# Patient Record
Sex: MALE | Race: WHITE | NOT HISPANIC OR LATINO | Employment: OTHER | URBAN - METROPOLITAN AREA
[De-identification: names, ages, dates, MRNs, and addresses within clinical notes are randomized per-mention and may not be internally consistent; named-entity substitution may affect disease eponyms.]

---

## 2017-08-18 ENCOUNTER — GENERIC CONVERSION - ENCOUNTER (OUTPATIENT)
Dept: OTHER | Facility: OTHER | Age: 82
End: 2017-08-18

## 2017-10-19 ENCOUNTER — GENERIC CONVERSION - ENCOUNTER (OUTPATIENT)
Dept: OTHER | Facility: OTHER | Age: 82
End: 2017-10-19

## 2017-12-14 ENCOUNTER — GENERIC CONVERSION - ENCOUNTER (OUTPATIENT)
Dept: OTHER | Facility: OTHER | Age: 82
End: 2017-12-14

## 2018-01-12 NOTE — MISCELLANEOUS
Message   Recorded as Task   Date: 10/19/2017 12:20 PM, Created By: Akira Garcia   Task Name: Call Back   Assigned To: Paramjit JAVIER,TEAM   Regarding Patient: Trisha Rojas, Status: In Progress   CommentTheron Kehr - 19 Oct 2017 12:20 PM     TASK CREATED  Caller: Tricia Phan, Other; [phone]  [facility] lmom in need of new cath change order  Memo Adhikari - 19 Oct 2017 12:41 PM     TASK IN PROGRESS   Memo Adhikari - 19 Oct 2017 12:41 PM     TASK EDITED  LMOM TO CALL BACK  ParrySaima - 19 Oct 2017 12:45 PM     TASK EDITED  VERBAL ORDER GIVEN TO CONTINUE SP TUBE HOME CARE CHANGE, EVERY 4-6 WEEKS, AND FLUSH AS NEEDED          Signatures   Electronically signed by : Nilda Hernandez, ; Oct 19 2017 12:45PM EST                       (Author)

## 2018-01-16 NOTE — MISCELLANEOUS
Message   Recorded as Task   Date: 08/18/2017 01:12 PM, Created By: Florin Richard   Task Name: Call Back   Assigned To: Paramjit JAVIER,TEAM   Regarding Patient: Neri Wan, Status: Active   Comment:    Florin Richard - 18 Aug 2017 1:12 PM     TASK CREATED    [facility] calling pt is up for recert and is asking to speak to a nurse  Randi 98 - 18 Aug 2017 2:14 PM     TASK EDITED  Spoke to Ron Kent, Recertification for Home Care given          Signatures   Electronically signed by : Jeffery Griffiths RN; Aug 18 2017  2:14PM EST                       (Author)

## 2018-01-18 NOTE — MISCELLANEOUS
Message   Recorded as Task   Date: 10/19/2017 12:20 PM, Created By: Alissa Adler   Task Name: Call Back   Assigned To: Paramjit JAVIER,TEAM   Regarding Patient: Vianney Westfall, Status: In Progress   CommentRome Dill - 19 Oct 2017 12:20 PM     TASK CREATED  Caller: Priscilla Robles, Other; [phone]  [facility] lmom in need of new cath change order  Mayra Chin - 19 Oct 2017 12:41 PM     TASK IN PROGRESS   Mayra Chin - 19 Oct 2017 12:41 PM     TASK EDITED  LMOM TO CALL BACK          Signatures   Electronically signed by : Lolita Williamson, ; Oct 19 2017 12:42PM EST                       (Author)

## 2018-01-23 NOTE — MISCELLANEOUS
Message   Recorded as Task   Date: 12/14/2017 03:22 PM, Created By: Quan Johnson   Task Name: Call Back   Assigned To: Paramjit WatsonB,TEAM   Regarding Patient: Christina Andersen, Status: Active   Comment:    Quan Johnson - 14 Dec 2017 3:22 PM     Paramjit Cain calling pt is up for recert and looking for a verbal  [phone]   Gemma Mae - 14 Dec 2017 4:36 PM     TASK EDITED  SPOKE TO Marek Rider  VERBAL GIVEN FOR RECERT          Signatures   Electronically signed by : Rachel Mcmahon RN; Dec 14 2017  4:37PM EST                       (Author)

## 2018-02-06 ENCOUNTER — TELEPHONE (OUTPATIENT)
Dept: UROLOGY | Facility: MEDICAL CENTER | Age: 83
End: 2018-02-06

## 2018-04-13 ENCOUNTER — TELEPHONE (OUTPATIENT)
Dept: UROLOGY | Facility: AMBULATORY SURGERY CENTER | Age: 83
End: 2018-04-13

## 2018-04-13 NOTE — TELEPHONE ENCOUNTER
Spoke with Everett Severance from [facility]  Patient is being discharged  If you have any questions please call back [phone]   Thank you

## 2018-04-13 NOTE — TELEPHONE ENCOUNTER
SPOKE WITH ANALIA, SHE SAY'S THAT THE PT S HOME NURSE WILL STILL BE FOLLOWING UP AND CHANGING HIS CATHETER AND HE WILL KEEP FOLLOWED UP APPT  IN July

## 2018-04-19 ENCOUNTER — TELEPHONE (OUTPATIENT)
Dept: UROLOGY | Facility: HOSPITAL | Age: 83
End: 2018-04-19

## 2018-06-15 ENCOUNTER — TELEPHONE (OUTPATIENT)
Dept: UROLOGY | Facility: MEDICAL CENTER | Age: 83
End: 2018-06-15

## 2018-06-15 DIAGNOSIS — N39.0 URINARY TRACT INFECTION WITHOUT HEMATURIA, SITE UNSPECIFIED: Primary | ICD-10-CM

## 2018-07-19 ENCOUNTER — OFFICE VISIT (OUTPATIENT)
Dept: UROLOGY | Facility: MEDICAL CENTER | Age: 83
End: 2018-07-19
Payer: MEDICARE

## 2018-07-19 VITALS
WEIGHT: 165 LBS | HEIGHT: 69 IN | BODY MASS INDEX: 24.44 KG/M2 | DIASTOLIC BLOOD PRESSURE: 80 MMHG | SYSTOLIC BLOOD PRESSURE: 130 MMHG

## 2018-07-19 DIAGNOSIS — Z85.46 HISTORY OF PROSTATE CANCER: ICD-10-CM

## 2018-07-19 DIAGNOSIS — N39.3 URINARY INCONTINENCE, MALE, STRESS: Primary | ICD-10-CM

## 2018-07-19 PROCEDURE — 99214 OFFICE O/P EST MOD 30 MIN: CPT | Performed by: UROLOGY

## 2018-07-19 RX ORDER — MECLIZINE HYDROCHLORIDE 25 MG/1
25 TABLET ORAL
Status: ON HOLD | COMMUNITY
End: 2020-02-19 | Stop reason: SDUPTHER

## 2018-07-19 RX ORDER — IPRATROPIUM/ALBUTEROL SULFATE 20-100 MCG
MIST INHALER (GRAM) INHALATION
Status: ON HOLD | COMMUNITY
Start: 2018-07-05 | End: 2020-11-26

## 2018-07-19 RX ORDER — SIMVASTATIN 40 MG
40 TABLET ORAL
COMMUNITY

## 2018-07-19 RX ORDER — OXYBUTYNIN CHLORIDE 10 MG/1
15 TABLET, EXTENDED RELEASE ORAL DAILY
COMMUNITY
End: 2019-11-18 | Stop reason: DRUGHIGH

## 2018-07-19 RX ORDER — BUDESONIDE AND FORMOTEROL FUMARATE DIHYDRATE 160; 4.5 UG/1; UG/1
2 AEROSOL RESPIRATORY (INHALATION) 2 TIMES DAILY
COMMUNITY

## 2018-07-19 RX ORDER — MULTIVITAMIN
1 TABLET ORAL DAILY
COMMUNITY

## 2018-07-19 RX ORDER — ACETAMINOPHEN 325 MG/1
650 TABLET ORAL EVERY 6 HOURS PRN
COMMUNITY

## 2018-07-19 NOTE — LETTER
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  The patient for many years and and artificial urinary sphincter that worked quite well  Unfortunately the patient ultimately eroded into the urethra requiring removal of the artificial urinary sphincter initially with placement of a urethral Acevedo and eventually conversion of the urethral Acevedo to a suprapubic tube  The patient currently is having the suprapubic tube changed by the visiting nurse every 2 weeks and is pleased with the results of that care plan  2   History of prostate cancer  The patient has no evidence of disease recurrence  Diagnoses and all orders for this visit:    Urinary incontinence, male, stress    History of prostate cancer  -     PSA Total, Diagnostic; Future  -     Comprehensive metabolic panel; Future    Other orders  -     Multiple Vitamin (MULTIVITAMIN) tablet; Take 1 tablet by mouth daily  -     oxybutynin (DITROPAN-XL) 10 MG 24 hr tablet; Take 10 mg by mouth daily at bedtime  -     simvastatin (ZOCOR) 40 mg tablet; Take 40 mg by mouth daily at bedtime  -     budesonide-formoterol (SYMBICORT) 160-4 5 mcg/act inhaler; Inhale 2 puffs 2 (two) times a day Rinse mouth after use  -     meclizine (ANTIVERT) 25 mg tablet;  Take by mouth 3 (three) times a day as needed for dizziness  - acetaminophen (TYLENOL) 325 mg tablet; Take 650 mg by mouth every 6 (six) hours as needed for mild pain  -     COMBIVENT RESPIMAT inhaler;           Subjective:     Patient ID: Lopez Westfall is a 80 y o  male  Chief complaint:  History of prostate cancer and urinary incontinence with an indwelling suprapubic tube    History of present illness:  80year-old gentleman well known to me status post radical prostatectomy many years ago for prostate cancer with resultant artificial urinary sphincter surgery due to severe stress urinary incontinence  The sphincter was ultimately explanted because of erosion in the patient is now managed with a suprapubic tube noting still the requirement of 2-3 up protective undergarments because of urethral leakage daily  The patient however was pleased with his urinary status at this point and his tube will continue to be changed every 2-3 weeks by visiting nurse  Review of Systems   Constitutional: Negative  HENT: Negative  Respiratory: Negative  Cardiovascular: Negative  Gastrointestinal: Negative  Neurological: Negative  Objective:     Physical Exam   Constitutional: He is oriented to person, place, and time  He appears well-nourished  HENT:   Head: Atraumatic  Eyes: EOM are normal    Neck: Neck supple  Pulmonary/Chest: Effort normal  No respiratory distress  Abdominal: Soft  SP tube site CDI   Neurological: He is alert and oriented to person, place, and time  Psychiatric: He has a normal mood and affect  His behavior is normal  Judgment and thought content normal    Vitals reviewed

## 2018-07-19 NOTE — PROGRESS NOTES
Assessment/Plan:   1  Stress urinary incontinence status post radical prostatectomy many years ago for adenocarcinoma of the prostate  The patient for many years and and artificial urinary sphincter that worked quite well  Unfortunately the patient ultimately eroded into the urethra requiring removal of the artificial urinary sphincter initially with placement of a urethral Acevedo and eventually conversion of the urethral Acevedo to a suprapubic tube  The patient currently is having the suprapubic tube changed by the visiting nurse every 2 weeks and is pleased with the results of that care plan  2   History of prostate cancer  The patient has no evidence of disease recurrence  Diagnoses and all orders for this visit:    Urinary incontinence, male, stress    History of prostate cancer  -     PSA Total, Diagnostic; Future  -     Comprehensive metabolic panel; Future    Other orders  -     Multiple Vitamin (MULTIVITAMIN) tablet; Take 1 tablet by mouth daily  -     oxybutynin (DITROPAN-XL) 10 MG 24 hr tablet; Take 10 mg by mouth daily at bedtime  -     simvastatin (ZOCOR) 40 mg tablet; Take 40 mg by mouth daily at bedtime  -     budesonide-formoterol (SYMBICORT) 160-4 5 mcg/act inhaler; Inhale 2 puffs 2 (two) times a day Rinse mouth after use  -     meclizine (ANTIVERT) 25 mg tablet; Take by mouth 3 (three) times a day as needed for dizziness  -     acetaminophen (TYLENOL) 325 mg tablet; Take 650 mg by mouth every 6 (six) hours as needed for mild pain  -     COMBIVENT RESPIMAT inhaler;           Subjective:     Patient ID: Oziel Dukes is a 80 y o  male  Chief complaint:  History of prostate cancer and urinary incontinence with an indwelling suprapubic tube    History of present illness:  80year-old gentleman well known to me status post radical prostatectomy many years ago for prostate cancer with resultant artificial urinary sphincter surgery due to severe stress urinary incontinence    The sphincter was ultimately explanted because of erosion in the patient is now managed with a suprapubic tube noting still the requirement of 2-3 up protective undergarments because of urethral leakage daily  The patient however was pleased with his urinary status at this point and his tube will continue to be changed every 2-3 weeks by visiting nurse  Review of Systems   Constitutional: Negative  HENT: Negative  Respiratory: Negative  Cardiovascular: Negative  Gastrointestinal: Negative  Neurological: Negative  Objective:     Physical Exam   Constitutional: He is oriented to person, place, and time  He appears well-nourished  HENT:   Head: Atraumatic  Eyes: EOM are normal    Neck: Neck supple  Pulmonary/Chest: Effort normal  No respiratory distress  Abdominal: Soft  SP tube site CDI   Neurological: He is alert and oriented to person, place, and time  Psychiatric: He has a normal mood and affect  His behavior is normal  Judgment and thought content normal    Vitals reviewed

## 2018-08-21 ENCOUNTER — TELEPHONE (OUTPATIENT)
Dept: UROLOGY | Facility: AMBULATORY SURGERY CENTER | Age: 83
End: 2018-08-21

## 2018-08-21 NOTE — TELEPHONE ENCOUNTER
Spoke with [facility] home care nurse, she would like a re- certification order sent to her  Their call back number is [phone]   Thank you

## 2018-08-21 NOTE — TELEPHONE ENCOUNTER
Spoke to Phyllis falls  Re-certifcation due this week  Pt was seen 7/19/2018 in office  Verbal given per   orders for Re-certification of Home Care services

## 2018-08-22 NOTE — TELEPHONE ENCOUNTER
Pt's SP tube needs to be changed almost every two weeks due to sediment blocking  Irrigations are done weekly and pt presently has a 22 fr cath  Fluid intake is poor  Will forward to Dr Lance Shafferr re; increasing or changing irrigant

## 2018-08-22 NOTE — TELEPHONE ENCOUNTER
Nicolasa calling for recommendation as pt has continual blockage they have been irrigating with sterile water weekly     [phone]

## 2018-10-17 ENCOUNTER — TELEPHONE (OUTPATIENT)
Dept: UROLOGY | Facility: AMBULATORY SURGERY CENTER | Age: 83
End: 2018-10-17

## 2018-10-17 NOTE — TELEPHONE ENCOUNTER
Per Luan at [facility], she wanted to renew the orders  [phone] is the best call back number  Thank you

## 2018-10-18 NOTE — TELEPHONE ENCOUNTER
Phone Number for Pablo Dixon was incorrect it was for [facility] will await return call

## 2018-11-06 ENCOUNTER — HOSPITAL ENCOUNTER (INPATIENT)
Facility: HOSPITAL | Age: 83
LOS: 7 days | Discharge: HOME HEALTH | DRG: 193 | End: 2018-11-13
Attending: FAMILY MEDICINE | Admitting: INTERNAL MEDICINE
Payer: MEDICARE

## 2018-11-06 ENCOUNTER — APPOINTMENT (EMERGENCY)
Dept: RADIOLOGY | Facility: HOSPITAL | Age: 83
DRG: 193 | End: 2018-11-06
Payer: MEDICARE

## 2018-11-06 DIAGNOSIS — R09.02 HYPOXIA: ICD-10-CM

## 2018-11-06 DIAGNOSIS — J18.9 PNEUMONIA: ICD-10-CM

## 2018-11-06 DIAGNOSIS — J44.9 COPD (CHRONIC OBSTRUCTIVE PULMONARY DISEASE) (HCC): Primary | ICD-10-CM

## 2018-11-06 DIAGNOSIS — R06.02 SOB (SHORTNESS OF BREATH): ICD-10-CM

## 2018-11-06 DIAGNOSIS — J18.9 PNEUMONIA DUE TO INFECTIOUS ORGANISM, UNSPECIFIED LATERALITY, UNSPECIFIED PART OF LUNG: Chronic | ICD-10-CM

## 2018-11-06 DIAGNOSIS — I48.91 ATRIAL FIBRILLATION (HCC): ICD-10-CM

## 2018-11-06 PROBLEM — J96.21 ACUTE ON CHRONIC RESPIRATORY FAILURE WITH HYPOXIA (HCC): Chronic | Status: ACTIVE | Noted: 2018-11-06

## 2018-11-06 LAB
ANION GAP SERPL CALCULATED.3IONS-SCNC: 7 MMOL/L (ref 4–13)
BACTERIA UR QL AUTO: ABNORMAL /HPF
BASOPHILS # BLD AUTO: 0 THOUSANDS/ΜL (ref 0–0.1)
BASOPHILS NFR BLD AUTO: 0 % (ref 0–2)
BILIRUB UR QL STRIP: NEGATIVE
BNP SERPL-MCNC: 162 PG/ML (ref 1–100)
BUN SERPL-MCNC: 18 MG/DL (ref 7–25)
CALCIUM SERPL-MCNC: 8.8 MG/DL (ref 8.6–10.5)
CHLORIDE SERPL-SCNC: 97 MMOL/L (ref 98–107)
CLARITY UR: ABNORMAL
CO2 SERPL-SCNC: 30 MMOL/L (ref 21–31)
COLOR UR: YELLOW
CREAT SERPL-MCNC: 0.93 MG/DL (ref 0.7–1.3)
EOSINOPHIL # BLD AUTO: 0 THOUSAND/ΜL (ref 0–0.61)
EOSINOPHIL NFR BLD AUTO: 0 % (ref 0–5)
ERYTHROCYTE [DISTWIDTH] IN BLOOD BY AUTOMATED COUNT: 14.8 % (ref 11.5–14.5)
FLUAV AG SPEC QL IA: NEGATIVE
FLUBV AG SPEC QL IA: NEGATIVE
GFR SERPL CREATININE-BSD FRML MDRD: 71 ML/MIN/1.73SQ M
GLUCOSE SERPL-MCNC: 125 MG/DL (ref 65–99)
GLUCOSE UR STRIP-MCNC: NEGATIVE MG/DL
HCT VFR BLD AUTO: 38.7 % (ref 36.5–49.3)
HGB BLD-MCNC: 12.4 G/DL (ref 14–18)
HGB UR QL STRIP.AUTO: ABNORMAL
INR PPP: 1.09 (ref 0.9–1.5)
KETONES UR STRIP-MCNC: ABNORMAL MG/DL
LACTATE SERPL-SCNC: 1.1 MMOL/L (ref 0.5–2)
LEUKOCYTE ESTERASE UR QL STRIP: ABNORMAL
LYMPHOCYTES # BLD AUTO: 2.8 THOUSANDS/ΜL (ref 0.6–4.47)
LYMPHOCYTES NFR BLD AUTO: 36 % (ref 21–51)
MCH RBC QN AUTO: 26.6 PG (ref 26–34)
MCHC RBC AUTO-ENTMCNC: 32.1 G/DL (ref 31–37)
MCV RBC AUTO: 83 FL (ref 81–99)
MONOCYTES # BLD AUTO: 0.8 THOUSAND/ΜL (ref 0.17–1.22)
MONOCYTES NFR BLD AUTO: 10 % (ref 2–12)
NEUTROPHILS # BLD AUTO: 4.1 THOUSANDS/ΜL (ref 1.4–6.5)
NEUTS SEG NFR BLD AUTO: 53 % (ref 42–75)
NITRITE UR QL STRIP: POSITIVE
NON-SQ EPI CELLS URNS QL MICRO: ABNORMAL /HPF
NRBC BLD AUTO-RTO: 0 /100 WBCS
PH UR STRIP.AUTO: 5 [PH] (ref 5–8)
PLATELET # BLD AUTO: 147 THOUSANDS/UL (ref 149–390)
PLATELET # BLD AUTO: 157 THOUSANDS/UL (ref 149–390)
PMV BLD AUTO: 10.6 FL (ref 8.6–11.7)
POTASSIUM SERPL-SCNC: 4.1 MMOL/L (ref 3.5–5.5)
PROT UR STRIP-MCNC: ABNORMAL MG/DL
PROTHROMBIN TIME: 12.6 SECONDS (ref 10.2–13)
RBC # BLD AUTO: 4.65 MILLION/UL (ref 4.3–5.9)
RBC #/AREA URNS AUTO: ABNORMAL /HPF
SODIUM SERPL-SCNC: 134 MMOL/L (ref 134–143)
SP GR UR STRIP.AUTO: >=1.03 (ref 1–1.03)
UROBILINOGEN UR QL STRIP.AUTO: 0.2 E.U./DL
WBC # BLD AUTO: 7.6 THOUSAND/UL (ref 4.8–10.8)
WBC #/AREA URNS AUTO: ABNORMAL /HPF

## 2018-11-06 PROCEDURE — 85025 COMPLETE CBC W/AUTO DIFF WBC: CPT | Performed by: FAMILY MEDICINE

## 2018-11-06 PROCEDURE — 87631 RESP VIRUS 3-5 TARGETS: CPT | Performed by: FAMILY MEDICINE

## 2018-11-06 PROCEDURE — 36415 COLL VENOUS BLD VENIPUNCTURE: CPT | Performed by: FAMILY MEDICINE

## 2018-11-06 PROCEDURE — 99285 EMERGENCY DEPT VISIT HI MDM: CPT

## 2018-11-06 PROCEDURE — 87449 NOS EACH ORGANISM AG IA: CPT | Performed by: INTERNAL MEDICINE

## 2018-11-06 PROCEDURE — 83605 ASSAY OF LACTIC ACID: CPT

## 2018-11-06 PROCEDURE — 87040 BLOOD CULTURE FOR BACTERIA: CPT | Performed by: FAMILY MEDICINE

## 2018-11-06 PROCEDURE — 71046 X-RAY EXAM CHEST 2 VIEWS: CPT

## 2018-11-06 PROCEDURE — 93005 ELECTROCARDIOGRAM TRACING: CPT

## 2018-11-06 PROCEDURE — 85610 PROTHROMBIN TIME: CPT | Performed by: FAMILY MEDICINE

## 2018-11-06 PROCEDURE — 84145 PROCALCITONIN (PCT): CPT | Performed by: INTERNAL MEDICINE

## 2018-11-06 PROCEDURE — 80048 BASIC METABOLIC PNL TOTAL CA: CPT | Performed by: FAMILY MEDICINE

## 2018-11-06 PROCEDURE — 85049 AUTOMATED PLATELET COUNT: CPT | Performed by: INTERNAL MEDICINE

## 2018-11-06 PROCEDURE — 94664 DEMO&/EVAL PT USE INHALER: CPT

## 2018-11-06 PROCEDURE — 99222 1ST HOSP IP/OBS MODERATE 55: CPT | Performed by: INTERNAL MEDICINE

## 2018-11-06 PROCEDURE — 83880 ASSAY OF NATRIURETIC PEPTIDE: CPT | Performed by: FAMILY MEDICINE

## 2018-11-06 PROCEDURE — 81001 URINALYSIS AUTO W/SCOPE: CPT | Performed by: FAMILY MEDICINE

## 2018-11-06 PROCEDURE — 94760 N-INVAS EAR/PLS OXIMETRY 1: CPT

## 2018-11-06 PROCEDURE — 87081 CULTURE SCREEN ONLY: CPT | Performed by: INTERNAL MEDICINE

## 2018-11-06 RX ORDER — MECLIZINE HCL 12.5 MG/1
25 TABLET ORAL EVERY 8 HOURS PRN
Status: DISCONTINUED | OUTPATIENT
Start: 2018-11-06 | End: 2018-11-13 | Stop reason: HOSPADM

## 2018-11-06 RX ORDER — IPRATROPIUM BROMIDE AND ALBUTEROL SULFATE 2.5; .5 MG/3ML; MG/3ML
3 SOLUTION RESPIRATORY (INHALATION)
Status: DISCONTINUED | OUTPATIENT
Start: 2018-11-06 | End: 2018-11-08

## 2018-11-06 RX ORDER — HEPARIN SODIUM 5000 [USP'U]/ML
5000 INJECTION, SOLUTION INTRAVENOUS; SUBCUTANEOUS EVERY 8 HOURS SCHEDULED
Status: DISCONTINUED | OUTPATIENT
Start: 2018-11-06 | End: 2018-11-13 | Stop reason: HOSPADM

## 2018-11-06 RX ORDER — OXYBUTYNIN CHLORIDE 5 MG/1
10 TABLET, EXTENDED RELEASE ORAL
Status: DISCONTINUED | OUTPATIENT
Start: 2018-11-06 | End: 2018-11-13 | Stop reason: HOSPADM

## 2018-11-06 RX ORDER — IPRATROPIUM BROMIDE AND ALBUTEROL SULFATE 2.5; .5 MG/3ML; MG/3ML
3 SOLUTION RESPIRATORY (INHALATION)
Status: DISCONTINUED | OUTPATIENT
Start: 2018-11-06 | End: 2018-11-06

## 2018-11-06 RX ORDER — BUDESONIDE AND FORMOTEROL FUMARATE DIHYDRATE 160; 4.5 UG/1; UG/1
2 AEROSOL RESPIRATORY (INHALATION) 2 TIMES DAILY
Status: DISCONTINUED | OUTPATIENT
Start: 2018-11-06 | End: 2018-11-13 | Stop reason: HOSPADM

## 2018-11-06 RX ORDER — IPRATROPIUM BROMIDE AND ALBUTEROL SULFATE 2.5; .5 MG/3ML; MG/3ML
3 SOLUTION RESPIRATORY (INHALATION) ONCE
Status: COMPLETED | OUTPATIENT
Start: 2018-11-06 | End: 2018-11-06

## 2018-11-06 RX ORDER — ACETAMINOPHEN 325 MG/1
650 TABLET ORAL EVERY 6 HOURS PRN
Status: DISCONTINUED | OUTPATIENT
Start: 2018-11-06 | End: 2018-11-13 | Stop reason: HOSPADM

## 2018-11-06 RX ORDER — SODIUM CHLORIDE 9 MG/ML
75 INJECTION, SOLUTION INTRAVENOUS CONTINUOUS
Status: DISCONTINUED | OUTPATIENT
Start: 2018-11-06 | End: 2018-11-07

## 2018-11-06 RX ADMIN — IPRATROPIUM BROMIDE AND ALBUTEROL SULFATE 3 ML: .5; 3 SOLUTION RESPIRATORY (INHALATION) at 17:23

## 2018-11-06 RX ADMIN — HEPARIN SODIUM 5000 UNITS: 5000 INJECTION INTRAVENOUS; SUBCUTANEOUS at 22:53

## 2018-11-06 RX ADMIN — AZITHROMYCIN FOR INJECTION INJECTION, POWDER, LYOPHILIZED, FOR SOLUTION 500 MG: 500 INJECTION INTRAVENOUS at 23:57

## 2018-11-06 RX ADMIN — SODIUM CHLORIDE 75 ML/HR: 9 INJECTION, SOLUTION INTRAVENOUS at 20:36

## 2018-11-06 RX ADMIN — AZTREONAM 2000 MG: 2 INJECTION, POWDER, LYOPHILIZED, FOR SOLUTION INTRAMUSCULAR; INTRAVENOUS at 22:48

## 2018-11-06 RX ADMIN — BUDESONIDE AND FORMOTEROL FUMARATE DIHYDRATE 2 PUFF: 160; 4.5 AEROSOL RESPIRATORY (INHALATION) at 21:00

## 2018-11-06 NOTE — ED PROVIDER NOTES
History  Chief Complaint   Patient presents with    Shortness of Breath     cough started sunday       History provided by:  Patient   used: No     Th denies sick contacts  is is a 51-year-old male who was the presented to ED from [facility] with complaint of shortness of breath and productive cough x4 days  Patient's history of COPD on 2 L oxygen [place] has been getting a breathing treatment around the clock without much improvement  Patient states that his shortness of breath was worse today, his PCP was called recommended to send the patient to ED  Patient was found to have low-grade fever patient was found to be hypoxic with oxygen saturation of 85% on 2 L  patient oxygen was increased to 6 L which improved his saturation to 92%  Denies any chest pain  Prior to Admission Medications   Prescriptions Last Dose Informant Patient Reported? Taking? COMBIVENT RESPIMAT inhaler   Yes Yes   Multiple Vitamin (MULTIVITAMIN) tablet   Yes Yes   Sig: Take 1 tablet by mouth daily   acetaminophen (TYLENOL) 325 mg tablet  Self Yes Yes   Sig: Take 650 mg by mouth every 6 (six) hours as needed for mild pain   budesonide-formoterol (SYMBICORT) 160-4 5 mcg/act inhaler   Yes Yes   Sig: Inhale 2 puffs 2 (two) times a day Rinse mouth after use     meclizine (ANTIVERT) 25 mg tablet   Yes No   Sig: Take by mouth 3 (three) times a day as needed for dizziness   oxybutynin (DITROPAN-XL) 10 MG 24 hr tablet   Yes Yes   Sig: Take 10 mg by mouth daily at bedtime   simvastatin (ZOCOR) 40 mg tablet   Yes Yes   Sig: Take 40 mg by mouth daily at bedtime      Facility-Administered Medications: None       Past Medical History:   Diagnosis Date    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     [facility]chronic obstructive [facility] disease) [facility] [address] [facility]     Hypercholesteremia     Hyperlipidemia     Malignant neoplasm prostate ([facility] [address] )     Phimosis     Stress incontinence     Urinary retention     Urinary urgency        Past Surgical History:   Procedure Laterality Date    BLADDER FULGURATION  2012, 2013   [place], [place]  2013    PROSTATECTOMY  1995    TOTAL HIP ARTHROPLASTY Left 2012    URINARY SPHINCTER IMPLANT  1996    Repaired in 2002; Removed in 2012    UROFLOWMETRY SIMPLE / COMPLEX  1996       History reviewed  No pertinent family history  I have reviewed and agree with the history as documented  Social History   Substance Use Topics    Smoking status: Former Smoker     Packs/day: 0 50     Types: Cigarettes     Start date: 1947    Smokeless tobacco: Never Used    Alcohol use No        Review of Systems   Constitutional: Positive for fever  HENT: Negative  Respiratory: Positive for shortness of breath and wheezing  Cardiovascular: Negative  Gastrointestinal: Negative  Genitourinary: Negative  Musculoskeletal: Negative  Neurological: Negative  Psychiatric/Behavioral: Negative  Physical Exam  Physical Exam   Constitutional: He is oriented to person, place, and time  He appears well-developed  He appears distressed  HENT:   Head: Normocephalic and atraumatic  Eyes: Pupils are equal, round, and reactive to light  EOM are normal    Cardiovascular: Normal heart sounds  Tachycardia present  Pulmonary/Chest: He is in respiratory distress  He has wheezes  He has rales  Abdominal: Soft  Bowel sounds are normal  There is no tenderness  Musculoskeletal: He exhibits no edema  Neurological: He is alert and oriented to person, place, and time  Skin: Skin is warm  Nursing note and vitals reviewed        Vital Signs  ED Triage Vitals [11/06/18 1632]   Temperature Pulse Respirations Blood Pressure SpO2   99 4 °F (37 4 °C) (!) 112 (!) 24 141/63 92 %      Temp Source Heart Rate Source Patient Position - Orthostatic VS BP Location FiO2 (%)   Temporal Monitor -- -- --      Pain Score       --           Vitals:    11/06/18 1632   BP: 141/63   Pulse: (!) 112       Visual Acuity      ED Medications  Medications   ipratropium-albuterol (DUO-NEB) 0 5-2 5 mg/3 mL inhalation solution 3 mL (3 mL Nebulization Given 11/6/18 1723)       Diagnostic Studies  Results Reviewed     Procedure Component Value Units Date/Time    Lactic acid, plasma [[id number]]  (Normal) Collected:  11/06/18 1709    Lab Status:  Final result Specimen:  Blood from Arm, Left Updated:  11/06/18 1753     LACTIC ACID 1 1 mmol/L     Narrative:         Result may be elevated if tourniquet was used during collection  B-Type Natriuretic Peptide [facility] ONLY) [[id number]]  (Abnormal) Collected:  11/06/18 1643    Lab Status:  Final result Specimen:  Blood from Arm, Left Updated:  11/06/18 1753      (H) pg/mL     Basic metabolic panel [[id number]]  (Abnormal) Collected:  11/06/18 1643    Lab Status:  Final result Specimen:  Blood from Arm, Left Updated:  11/06/18 1736     Sodium 134 mmol/L      Potassium 4 1 mmol/L      Chloride 97 (L) mmol/L      CO2 30 mmol/L      ANION GAP 7 mmol/L      BUN 18 mg/dL      Creatinine 0 93 mg/dL      Glucose 125 (H) mg/dL      Calcium 8 8 mg/dL      eGFR 71 ml/min/1 73sq m     Narrative:         National Kidney Disease Education Program recommendations are as follows:  GFR calculation is accurate only with a steady state creatinine  Chronic Kidney disease less than 60 ml/min/1 73 sq  meters  Kidney failure less than 15 ml/min/1 73 sq  meters  Rapid Influenza Screen with Reflex PCR (indicated for patients <2mo of age) [[id number]]  (Normal) Collected:  11/06/18 1704    Lab Status:  Final result Specimen:  Nasopharyngeal from Nasopharyngeal Swab Updated:  11/06/18 1734     Rapid Influenza A Ag Negative     Rapid Influenza B Ag Negative    Influenza A/B and RSV by PCR (Indicated for patients > 2 mo of age) [[id number]] Collected:  11/06/18 1704    Lab Status:   In process Specimen:  Nasopharyngeal from Nasopharyngeal Swab Updated:  11/06/18 1734    Protime-INR [[id number]] (Normal) Collected:  11/06/18 1643    Lab Status:  Final result Specimen:  Blood from Arm, Left Updated:  11/06/18 1729     Protime 12 6 seconds      INR 1 09    CBC and differential [[id number]]  (Abnormal) Collected:  11/06/18 1643    Lab Status:  Final result Specimen:  Blood from Arm, Left Updated:  11/06/18 1718     WBC 7 60 Thousand/uL      RBC 4 65 Million/uL      Hemoglobin 12 4 (L) g/dL      Hematocrit 38 7 %      MCV 83 fL      MCH 26 6 pg      MCHC 32 1 g/dL      RDW 14 8 (H) %      MPV 10 6 fL      Platelets 811 Thousands/uL      nRBC 0 /100 WBCs      Neutrophils Relative 53 %      Lymphocytes Relative 36 %      Monocytes Relative 10 %      Eosinophils Relative 0 %      Basophils Relative 0 %      Neutrophils Absolute 4 10 Thousands/µL      Lymphocytes Absolute 2 80 Thousands/µL      Monocytes Absolute 0 80 Thousand/µL      Eosinophils Absolute 0 00 Thousand/µL      Basophils Absolute 0 00 Thousands/µL     Blood culture #1 [[id number]] Collected:  11/06/18 1705    Lab Status: In process Specimen:  Blood from Arm, Left Updated:  11/06/18 1713    Blood culture #2 [[id number]] Collected:  11/06/18 1705    Lab Status: In process Specimen:  Blood from Arm, Left Updated:  11/06/18 1713    UA w Reflex to Microscopic [[id number]]     Lab Status:  No result Specimen:  Urine                  XR chest 2 views   Final Result by Niki Marte (11/06 1901)   COPD with parenchymal scarring  Superimposed left midlung pneumonia not   excluded           Signed by Deon Palmer MD                 Procedures  Procedures       Phone Contacts  ED Phone Contact    ED Course                               MDM  CritCare Time    Disposition  Final diagnoses:   COPD (chronic obstructive pulmonary disease) ([address] )   Pneumonia   SOB (shortness of breath)   Hypoxia   Atrial fibrillation ([facility] [address] )     Time reflects when diagnosis was documented in both MDM as applicable and the Disposition within this note     Time User Action Codes Description Comment    11/6/2018  7:03 PM Reesa Millin Add [J44 9] COPD (chronic obstructive pulmonary disease) ([place] [address] )     11/6/2018  7:04 PM Reesa Millin Add [J18 9] Pneumonia     11/6/2018  7:04 PM Reesa Millin Modify [J44 9] COPD (chronic obstructive pulmonary disease) ([facility] )     11/6/2018  7:04 PM Reesa Millin Modify [J18 9] Pneumonia     11/6/2018  7:04 PM Marcial Melchor Add [R06 02] SOB (shortness of breath)     11/6/2018  7:04 PM Reesa Millin Modify [J44 9] COPD (chronic obstructive pulmonary disease) ([place] [address] )     11/6/2018  7:04 PM Reesa Millin Modify [J18 9] Pneumonia     11/6/2018  7:04 PM Reesa Millin Add [R09 02] Hypoxia     11/6/2018  7:06 PM Reesa Millin Add [I48 91] Atrial fibrillation [facility])       ED Disposition     ED Disposition Condition Comment    Admit  Case was discussed with Dr Noam Griffiths and the patient's admission status was agreed to be Admission Status: inpatient status to the service of Dr Cristina Kinney   Follow-up Information    None         Patient's Medications   Discharge Prescriptions    No medications on file     No discharge procedures on file      ED Provider  Electronically Signed by           Laureano Paidlla MD  11/06/18 7587       Laureano Padilla MD  11/06/18 8406

## 2018-11-07 PROBLEM — Z97.8 CHRONIC INDWELLING FOLEY CATHETER: Status: ACTIVE | Noted: 2018-11-07

## 2018-11-07 LAB
ALBUMIN SERPL BCP-MCNC: 2.9 G/DL (ref 3.5–5.7)
ALP SERPL-CCNC: 49 U/L (ref 55–165)
ALT SERPL W P-5'-P-CCNC: 15 U/L (ref 7–52)
ANION GAP SERPL CALCULATED.3IONS-SCNC: 7 MMOL/L (ref 4–13)
AST SERPL W P-5'-P-CCNC: 27 U/L (ref 13–39)
ATRIAL RATE: 93 BPM
BACTERIA SPT RESP CULT: NORMAL
BILIRUB SERPL-MCNC: 0.9 MG/DL (ref 0.2–1)
BUN SERPL-MCNC: 16 MG/DL (ref 7–25)
CALCIUM SERPL-MCNC: 8.4 MG/DL (ref 8.6–10.5)
CHLORIDE SERPL-SCNC: 99 MMOL/L (ref 98–107)
CO2 SERPL-SCNC: 29 MMOL/L (ref 21–31)
CREAT SERPL-MCNC: 0.85 MG/DL (ref 0.7–1.3)
ERYTHROCYTE [DISTWIDTH] IN BLOOD BY AUTOMATED COUNT: 15.1 % (ref 11.5–14.5)
FLUAV AG SPEC QL: NORMAL
FLUBV AG SPEC QL: NORMAL
GFR SERPL CREATININE-BSD FRML MDRD: 75 ML/MIN/1.73SQ M
GIANT PLATELETS BLD QL SMEAR: PRESENT
GLUCOSE SERPL-MCNC: 113 MG/DL (ref 65–99)
GRAM STN SPEC: NORMAL
HCT VFR BLD AUTO: 36.7 % (ref 36.5–49.3)
HGB BLD-MCNC: 11.8 G/DL (ref 14–18)
L PNEUMO1 AG UR QL IA.RAPID: NEGATIVE
LG PLATELETS BLD QL SMEAR: PRESENT
MCH RBC QN AUTO: 26.6 PG (ref 26–34)
MCHC RBC AUTO-ENTMCNC: 32.2 G/DL (ref 31–37)
MCV RBC AUTO: 83 FL (ref 81–99)
P AXIS: 49 DEGREES
PLATELET # BLD AUTO: 157 THOUSANDS/UL (ref 149–390)
PLATELET BLD QL SMEAR: ADEQUATE
PMV BLD AUTO: 10.7 FL (ref 8.6–11.7)
POTASSIUM SERPL-SCNC: 3.8 MMOL/L (ref 3.5–5.5)
PR INTERVAL: 116 MS
PROCALCITONIN SERPL-MCNC: 0.63 NG/ML
PROCALCITONIN SERPL-MCNC: 0.85 NG/ML
PROT SERPL-MCNC: 5.9 G/DL (ref 6.4–8.9)
QRS AXIS: -81 DEGREES
QRSD INTERVAL: 124 MS
QT INTERVAL: 374 MS
QTC INTERVAL: 465 MS
RBC # BLD AUTO: 4.44 MILLION/UL (ref 4.3–5.9)
RBC MORPH BLD: NORMAL
RSV B RNA SPEC QL NAA+PROBE: NORMAL
S PNEUM AG UR QL: NEGATIVE
SODIUM SERPL-SCNC: 135 MMOL/L (ref 134–143)
T WAVE AXIS: 68 DEGREES
VENTRICULAR RATE: 93 BPM
WBC # BLD AUTO: 6.8 THOUSAND/UL (ref 4.8–10.8)

## 2018-11-07 PROCEDURE — 94760 N-INVAS EAR/PLS OXIMETRY 1: CPT

## 2018-11-07 PROCEDURE — 94640 AIRWAY INHALATION TREATMENT: CPT

## 2018-11-07 PROCEDURE — 87086 URINE CULTURE/COLONY COUNT: CPT | Performed by: NURSE PRACTITIONER

## 2018-11-07 PROCEDURE — 93010 ELECTROCARDIOGRAM REPORT: CPT | Performed by: INTERNAL MEDICINE

## 2018-11-07 PROCEDURE — 84145 PROCALCITONIN (PCT): CPT | Performed by: INTERNAL MEDICINE

## 2018-11-07 PROCEDURE — 85027 COMPLETE CBC AUTOMATED: CPT | Performed by: INTERNAL MEDICINE

## 2018-11-07 PROCEDURE — 80053 COMPREHEN METABOLIC PANEL: CPT | Performed by: INTERNAL MEDICINE

## 2018-11-07 PROCEDURE — 99232 SBSQ HOSP IP/OBS MODERATE 35: CPT | Performed by: NURSE PRACTITIONER

## 2018-11-07 PROCEDURE — 94664 DEMO&/EVAL PT USE INHALER: CPT

## 2018-11-07 PROCEDURE — 87205 SMEAR GRAM STAIN: CPT | Performed by: INTERNAL MEDICINE

## 2018-11-07 RX ORDER — GUAIFENESIN 600 MG
600 TABLET, EXTENDED RELEASE 12 HR ORAL EVERY 12 HOURS SCHEDULED
Status: DISCONTINUED | OUTPATIENT
Start: 2018-11-07 | End: 2018-11-13 | Stop reason: HOSPADM

## 2018-11-07 RX ORDER — SODIUM CHLORIDE 9 MG/ML
75 INJECTION, SOLUTION INTRAVENOUS CONTINUOUS
Status: DISCONTINUED | OUTPATIENT
Start: 2018-11-07 | End: 2018-11-08

## 2018-11-07 RX ORDER — ALBUTEROL SULFATE 2.5 MG/3ML
2.5 SOLUTION RESPIRATORY (INHALATION) EVERY 4 HOURS PRN
Status: DISCONTINUED | OUTPATIENT
Start: 2018-11-07 | End: 2018-11-13 | Stop reason: HOSPADM

## 2018-11-07 RX ORDER — METOPROLOL TARTRATE 5 MG/5ML
5 INJECTION INTRAVENOUS EVERY 6 HOURS PRN
Status: DISCONTINUED | OUTPATIENT
Start: 2018-11-07 | End: 2018-11-13 | Stop reason: HOSPADM

## 2018-11-07 RX ADMIN — GUAIFENESIN 600 MG: 600 TABLET, EXTENDED RELEASE ORAL at 17:16

## 2018-11-07 RX ADMIN — BUDESONIDE AND FORMOTEROL FUMARATE DIHYDRATE 2 PUFF: 160; 4.5 AEROSOL RESPIRATORY (INHALATION) at 10:20

## 2018-11-07 RX ADMIN — CEFEPIME HYDROCHLORIDE 1000 MG: 1 INJECTION, SOLUTION INTRAVENOUS at 17:10

## 2018-11-07 RX ADMIN — VANCOMYCIN HYDROCHLORIDE 750 MG: 1 INJECTION, POWDER, LYOPHILIZED, FOR SOLUTION INTRAVENOUS at 19:00

## 2018-11-07 RX ADMIN — HEPARIN SODIUM 5000 UNITS: 5000 INJECTION INTRAVENOUS; SUBCUTANEOUS at 21:47

## 2018-11-07 RX ADMIN — IPRATROPIUM BROMIDE AND ALBUTEROL SULFATE 3 ML: .5; 3 SOLUTION RESPIRATORY (INHALATION) at 07:14

## 2018-11-07 RX ADMIN — IPRATROPIUM BROMIDE AND ALBUTEROL SULFATE 3 ML: .5; 3 SOLUTION RESPIRATORY (INHALATION) at 19:53

## 2018-11-07 RX ADMIN — HEPARIN SODIUM 5000 UNITS: 5000 INJECTION INTRAVENOUS; SUBCUTANEOUS at 17:16

## 2018-11-07 RX ADMIN — OXYBUTYNIN CHLORIDE 10 MG: 5 TABLET, EXTENDED RELEASE ORAL at 21:47

## 2018-11-07 RX ADMIN — ACETAMINOPHEN 650 MG: 325 TABLET ORAL at 22:51

## 2018-11-07 RX ADMIN — AZTREONAM 2000 MG: 2 INJECTION, POWDER, LYOPHILIZED, FOR SOLUTION INTRAMUSCULAR; INTRAVENOUS at 05:06

## 2018-11-07 RX ADMIN — IPRATROPIUM BROMIDE AND ALBUTEROL SULFATE 3 ML: .5; 3 SOLUTION RESPIRATORY (INHALATION) at 03:10

## 2018-11-07 RX ADMIN — BUDESONIDE AND FORMOTEROL FUMARATE DIHYDRATE 2 PUFF: 160; 4.5 AEROSOL RESPIRATORY (INHALATION) at 19:00

## 2018-11-07 RX ADMIN — ACETAMINOPHEN 650 MG: 325 TABLET ORAL at 05:01

## 2018-11-07 RX ADMIN — HEPARIN SODIUM 5000 UNITS: 5000 INJECTION INTRAVENOUS; SUBCUTANEOUS at 05:06

## 2018-11-07 RX ADMIN — IPRATROPIUM BROMIDE AND ALBUTEROL SULFATE 3 ML: .5; 3 SOLUTION RESPIRATORY (INHALATION) at 14:31

## 2018-11-07 RX ADMIN — SODIUM CHLORIDE 75 ML/HR: 9 INJECTION, SOLUTION INTRAVENOUS at 17:10

## 2018-11-07 NOTE — SOCIAL WORK
CM met with pt at bedside and explained role  Pt lives at [facility] living [facility] with his wife  Pt reports he is able to complete most ADL's himself  Pt's goal is to return to assisted living with wife upon discharge  CM requested PT/OT consults to make sure pt is able to return and does not need STR upon discharge  [facility] if appropriate  CM to follow  CM reviewed d/c planning process including the following: identifying help at home, patient preference for d/c planning needs, availability of treatment team to discuss questions or concerns patient and/or family may have regarding understanding medications and recognizing signs and symptoms once discharged  CM also encouraged patient to follow up with all recommended appointments after discharge  Patient advised of importance for patient and family to participate in managing patients medical well being

## 2018-11-07 NOTE — ASSESSMENT & PLAN NOTE
· Likely exacerbated by pneumonia as noted above  · Will treat with antibiotics as noted above  · Patient does not appear to be in any COPD exacerbation at this time

## 2018-11-07 NOTE — CONSULTS
Vancomycin Assessment    Linda Epps is a 80 y o  male who is currently receiving vancomycin  750 mg IV Q 12Hrs for Pneumonia     Relevant clinical data and objective history reviewed:  Creatinine   Date Value Ref Range Status   11/07/2018 0 85 0 70 - 1 30 mg/dL Final     Comment:     Standardized to IDMS reference method   11/06/2018 0 93 0 70 - 1 30 mg/dL Final     Comment:     Standardized to IDMS reference method     /61 (BP Location: Left arm)   Pulse 82   Temp 99 1 °F (37 3 °C) (Tympanic)   Resp 22   Ht 5' 11" (1 803 m)   Wt 72 6 kg (160 lb)   SpO2 97%   BMI 22 32 kg/m²   No intake/output data recorded  Lab Results   Component Value Date/Time    BUN 16 11/07/2018 05:07 AM    WBC 6 80 11/07/2018 05:07 AM    HGB 11 8 (L) 11/07/2018 05:07 AM    HCT 36 7 11/07/2018 05:07 AM    MCV 83 11/07/2018 05:07 AM     11/07/2018 05:07 AM     Temp Readings from Last 3 Encounters:   11/07/18 99 1 °F (37 3 °C) (Tympanic)     Vancomycin Days of Therapy: 1    Assessment/Plan  The patient is currently on vancomycin utilizing scheduled dosing based on actual body weight  Baseline risks associated with therapy include: concomitant nephrotoxic medications and advanced age  The patient is currently receiving Vancomycin 750 mg IV Q 12Hrs  Pharmacy will also follow closely for s/sx of nephrotoxicity, infusion reactions and appropriateness of therapy  BMP and CBC will be ordered per protocol  Plan for trough as patient approaches steady state, prior to the 5th  dose at approximately 1500 on 11/9/18  Due to infection severity, will target a trough of 15-20 (appropriate for most indications)   Pharmacy will continue to follow the patients culture results and clinical progress daily      Kari Calabrese, Pharmacist

## 2018-11-07 NOTE — H&P
H&P- Catina Rodriguez 11/15/1924, 80 y o  male MRN: [id number]    Unit/Bed#: ED 01 Encounter: [id number]    Primary Care Provider: Ry Anglin   Date and time admitted to hospital: 11/6/2018  4:35 PM        * Pneumonia   Assessment & Plan    · Check blood culture, sputum culture, urine Legionella, strep pneumo  · Check procalcitonin  · We will treat the patient with ceftriaxone azithromycin pending culture results  · Titrate oxygen     Acute on chronic respiratory failure with hypoxia ([facility] [address] )   Assessment & Plan    · Likely exacerbated by pneumonia as noted above  · Will treat with antibiotics as noted above  · Patient does not appear to be in any COPD exacerbation at this time     COPD (chronic obstructive pulmonary disease) ([facility] )   Assessment & Plan    · Patient has history of COPD on chronic home oxygen of 2 L  · Does not appear to be in any COPD exacerbation at this time  · Continue home inhalers along with nebulizers p r n  VTE Prophylaxis: Heparin  Code Status: dnr/dni  POLST: POLST is not applicable to this patient  Discussion with family:  Daughter at bedside    Anticipated Length of Stay:  Patient will be admitted on an Inpatient basis with an anticipated length of stay of  > 2 midnights  Justification for Hospital Stay:   Pneumonia    Total Time for Visit, including Counseling / Coordination of Care: 45 minutes  Greater than 50% of this total time spent on direct patient counseling and coordination of care  Chief Complaint:     Cough and shortness of breath    History of Present Illness:    Catina Rodriguez is a 80 y o  male who presents with cough and shortness of breath  Patient notes his symptoms began approximately 3 days ago  He has been having a productive cough and shortness of breath, although denies any chest pain, nausea, vomiting and is otherwise well    Although the patient does have a history of COPD and is on chronic home oxygen, Patient has never had symptoms this severe before  In the emergency department patient was noted to be hypoxic on 2 L, and was subsequently placed on 6 L of oxygen  Upon my examination, patient notes that he feels better  Patient lives at an assisted living facility, is able to care for himself, and denies any recent travel or sick contacts  Review of Systems:  Review of Systems   Constitutional: Positive for fever  Respiratory: Positive for cough and shortness of breath  All other systems reviewed and are negative  Past Medical and Surgical History:   Past Medical History:   Diagnosis Date    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     [facility]     Hypercholesteremia     Hyperlipidemia     Malignant [facility] )     Phimosis     Stress incontinence     Urinary retention     Urinary urgency        Past Surgical History:   Procedure Laterality Date    BLADDER FULGURATION  2012, 2013    CIRCUMCISION, [place]  2013    PROSTATECTOMY  1995    TOTAL HIP ARTHROPLASTY Left 2012    URINARY SPHINCTER IMPLANT  1996    Repaired in 2002; Removed in 2012    UROFLOWMETRY SIMPLE / COMPLEX  1996       Meds/Allergies:  Prior to Admission medications    Medication Sig Start Date End Date Taking? Authorizing Provider   acetaminophen (TYLENOL) 325 mg tablet Take 650 mg by mouth every 6 (six) hours as needed for mild pain   Yes Historical Provider, MD   budesonide-formoterol (SYMBICORT) 160-4 5 mcg/act inhaler Inhale 2 puffs 2 (two) times a day Rinse mouth after use     Yes Historical Provider, MD   COMBIVENT RESPIMAT inhaler  7/5/18  Yes Historical Provider, MD   Multiple Vitamin (MULTIVITAMIN) tablet Take 1 tablet by mouth daily   Yes Historical Provider, MD   oxybutynin (DITROPAN-XL) 10 MG 24 hr tablet Take 10 mg by mouth daily at bedtime   Yes Historical Provider, MD   simvastatin (ZOCOR) 40 mg tablet Take 40 mg by mouth daily at bedtime   Yes Historical Provider, MD   meclizine (ANTIVERT) 25 mg tablet Take by mouth 3 (three) times a day as needed for dizziness    Historical Provider, MD     I have reviewed home medications with patient family member  Allergies: Allergies   Allergen Reactions    Prednisone Shortness Of Breath    Ciprofloxacin Other (See Comments)    Penicillins Other (See Comments)       Social History:  Marital Status: /Civil Union   Occupation: retired  Patient Pre-hospital Living Situation: assisted living  Patient Pre-hospital Level of Mobility: limited  Patient Pre-hospital Diet Restrictions: none  Substance Use History:     History   Alcohol Use No     History   Smoking Status    Former Smoker    Packs/day: 0 50    Types: Cigarettes    Start date: 1947   Smokeless Tobacco    Never Used     History   Drug Use No       Family History:  I have reviewed the patients family history    Physical Exam:   Vitals:   Blood Pressure: 141/63 (11/06/18 1632)  Pulse: (!) 111 (11/06/18 1830)  Temperature: 99 4 °F (37 4 °C) (11/06/18 1632)  Temp Source: Temporal (11/06/18 1632)  Respirations: 22 (11/06/18 1830)  Height: 5' 11" (180 3 cm) (11/06/18 1632)  Weight - Scale: 72 6 kg (160 lb) (11/06/18 1632)  SpO2: (!) 89 % (11/06/18 1830)    Physical Exam   Constitutional: He is oriented to person, place, and time  He appears well-developed and well-nourished  No distress  HENT:   Head: Normocephalic and atraumatic  Eyes: Pupils are equal, round, and reactive to light  EOM are normal    Neck: Normal range of motion  Neck supple  No JVD present  Cardiovascular: Regular rhythm and normal heart sounds  Tachycardic   Pulmonary/Chest: Effort normal  No respiratory distress  He has no wheezes  He has rales  Abdominal: Soft  Bowel sounds are normal  He exhibits no distension  There is no tenderness  Musculoskeletal: Normal range of motion  He exhibits no edema or tenderness  Neurological: He is alert and oriented to person, place, and time  Skin: Skin is warm  No rash noted  No erythema  Psychiatric: He has a normal mood and affect  His behavior is normal  Thought content normal    Nursing note and vitals reviewed  Additional Data:   Lab Results: I have personally reviewed pertinent reports  Results from last 7 days  Lab Units 11/06/18  1643   WBC Thousand/uL 7 60   HEMOGLOBIN g/dL 12 4*   HEMATOCRIT % 38 7   PLATELETS Thousands/uL 157   NEUTROS PCT % 53   LYMPHS PCT % 36   MONOS PCT % 10   EOS PCT % 0       Results from last 7 days  Lab Units 11/06/18  1643   POTASSIUM mmol/L 4 1   CHLORIDE mmol/L 97*   CO2 mmol/L 30   BUN mg/dL 18   CREATININE mg/dL 0 93   CALCIUM mg/dL 8 8       Results from last 7 days  Lab Units 11/06/18  1643   INR  1 09               Imaging: I have personally reviewed pertinent reports  XR chest 2 views   Final Result by Amparo Serrato (11/06 1901)   COPD with parenchymal scarring  Superimposed left midlung pneumonia not   excluded  Signed by Remington Parker MD          EKG, Pathology, and Other Studies Reviewed on Admission:   · EKG: n/a    NetAccess/Epic Records Reviewed: Yes     ** Please Note: This note has been constructed using a voice recognition system   **

## 2018-11-07 NOTE — ASSESSMENT & PLAN NOTE
· Patient has history of COPD on chronic home oxygen of 2 L  · Does not appear to be in any COPD exacerbation at this time  · Continue home inhalers along with nebulizers p r n

## 2018-11-07 NOTE — UTILIZATION REVIEW
Initial Clinical Review    Admission: Date/Time/Statement: 11/6/18 @ 1906     Orders Placed This Encounter   Procedures    Inpatient Admission (expected length of stay for this patient is greater than two midnights)     Standing Status:   Standing     Number of Occurrences:   1     Order Specific Question:   Admitting Physician     Answer:   Mary Kate Tracy [[id number]]     Order Specific Question:   Level of Care     Answer:   Med Surg [16]     Order Specific Question:   Estimated length of stay     Answer:   More than 2 Midnights     Order Specific Question:   Certification     Answer:   I certify that inpatient services are medically necessary for this patient for a duration of greater than two midnights  See H&P and MD Progress Notes for additional information about the patient's course of treatment  ED: Date/Time/Mode of Arrival:   ED Arrival Information     Expected Arrival Acuity Means of Arrival Escorted By Service Admission Type    - 11/6/2018 16:29 Urgent Ambulance [facility] General Medicine Urgent    Arrival Complaint    shortness of breath            Chief Complaint   Patient presents with    Shortness of Breath     cough started sunday       History of Illness    80year old male presents with 3 days of increasing cough and shortness of breath  Has copd and is on chronic home oxygen 2L  Increased to  4-6 L  O2 NC  Patient resides in assisted living and is independent with adl             ED Vital Signs:   11/06/18 1632 11/06/18 1632 11/06/18 1632 11/06/18 1632 11/06/18 1632   99 4 °F (37 4 °C) (!) 112 (!) 24 141/63 92 %      No Pain       11/06/18 72 6 kg (160 lb)       Vital Signs (abnormal):     11/07/18 0306  100 4 °F (38 °C)   117  18  117/71  93 %  Nasal cannula   11/06/18 2322   101 6 °F (38 7 °C)   111  20  158/84  92 %  Nasal cannula   11/06/18 2003  99 1 °F (37 3 °C)   139   24  163/85   87 %  Nasal cannula   11/06/18 1930  --   109  18  --  97 %  --   11/06/18 1915  --   113 25  --  91 %  --   11/06/18 1900  --   112  20  --  97 %  --   11/06/18 1845  --   110  20  --  95 %  --   11/06/18 1830  --   111  22  --   89 %  --   11/06/18 1815  --   109  18  --  96 %  --     Device Nasal c  Nasal c  Nasal c  Nasal c  Nasal c  Nasal c  Nasal c  Nasal c  O2 Therapy Oxygen      Oxygen    O2 Flow Rate (L/min) (L/min) 4 4 4 4 4 4 4 8           Abnormal Labs/Diagnostic Test Results:     Physical exam  Positive rales  Chest x ray shows  Copd with parenchymal scarring and superimposed left mid lung  Pneumonia     Procalcitonin   63  Repeat    85    Increased  liklhood of bacterial infection  Antibiotics encouraged   Urine Microscopic  Wbc  30-50  Bacteria  Innumerable   Cloudy  Sp gravity 1 030 H    Positive nitrites   Platelets  016 L      ED Treatment:   Medication Administration from 11/06/2018 1629 to 11/06/2018 1959       Date/Time Order Dose Route     11/06/2018 1723 ipratropium-albuterol (DUO-NEB) 0 5-2 5 mg/3 mL inhalation solution 3 mL 3 mL Nebulization           Past Medical History:    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    COPD [facility]chronic obstructive pulmonary disease) ([facility] [address] )    Hypercholesteremia    Hyperlipidemia    Malignant neoplasm prostate ([facility] )    Phimosis    Stress incontinence    Urinary retention    Urinary urgency       Admitting Diagnosis: Atrial fibrillation ([facility]) [I48 91]  Shortness of breath [R06 02]  COPD (chronic obstructive pulmonary disease) (HCC) [J44 9]  Pneumonia [J18 9]  SOB (shortness of breath) [R06 02]  Hypoxia [R09 02]    Age/Sex: 80 y o  male  With cough and shortness of breath, fever, tachycardia and hypoxia requiring more than baseline oxygen to maintain O2 sats > 89% ra   Chest x ray  Indicative of copd with superimposed pneumonia and urine results indicate Urinary tract infection     Assessment/Plan:     * Pneumonia   Assessment & Plan     · Check blood culture, sputum culture, urine Legionella, strep pneumo  · Check procalcitonin  · We will treat the patient with ceftriaxone azithromycin pending culture results  · Titrate oxygen     COPD (chronic obstructive pulmonary disease) (HCC)   Assessment & Plan     · Patient has history of COPD on chronic home oxygen of 2 L  · Does not appear to be in any COPD exacerbation at this time  · Continue home inhalers along with nebulizers p r n             Admission Orders:    Incentive spirometry  Hob elevated  Aspiration precautions  Telemetry  Sputum culture       Scheduled Meds:     acetaminophen 650 mg Oral Q6H PRN   azithromycin 500 mg Intravenous Q24H   aztreonam 2,000 mg Intravenous Q8H   budesonide-formoterol 2 puff Inhalation BID   heparin (porcine) 5,000 Units Subcutaneous Q8H [facility]   ipratropium-albuterol 3 mL Nebulization Q6H   meclizine 25 mg Oral Q8H PRN   oxybutynin 10 mg Oral HS   sodium chloride 75 mL/hr Intravenous Continuous

## 2018-11-07 NOTE — PLAN OF CARE
Problem: DISCHARGE PLANNING - CARE MANAGEMENT  Goal: Discharge to post-acute care or home with appropriate resources  INTERVENTIONS:  - Conduct assessment to determine patient/family and health care team treatment goals, and need for post-acute services based on payer coverage, community resources, and patient preferences, and barriers to discharge  - Address psychosocial, clinical, and financial barriers to discharge as identified in assessment in conjunction with the patient/family and health care team  - Arrange appropriate level of post-acute services according to patient's   needs and preference and payer coverage in collaboration with the physician and health care team  - Communicate with and update the patient/family, physician, and health care team regarding progress on the discharge plan  - Arrange appropriate transportation to post-acute venues  -Patient's goal is to return to assisted living upon discharge  Outcome: Progressing

## 2018-11-07 NOTE — ASSESSMENT & PLAN NOTE
· Will treat as health-care associated pneumonia- patient is an assisted living residence  · blood culture and sputum culture are pending   · urine Legionella, strep pneumo are negative   · Will trend procalcitonin  · Titrate oxygen  · I discussed medication allergies with patient and his daughter and son  Patient does not recognize that he had any penicillin allergy  They do not believe that he is allergic to ciprofloxacin either  · Will change his antibiotic regimen to cefepime and vancomycin  · Encourage cough and deep breathing, early ambulation, acapella and IS

## 2018-11-07 NOTE — ASSESSMENT & PLAN NOTE
· Check blood culture, sputum culture, urine Legionella, strep pneumo  · Check procalcitonin  · We will treat the patient with ceftriaxone azithromycin pending culture results  · Titrate oxygen

## 2018-11-07 NOTE — PROGRESS NOTES
Progress Note - Alon Jag 11/15/1924, 80 y o  male MRN: [id number]    Unit/Bed#: -01 Encounter: [id number]    Primary Care Provider: Halima Pratt   Date and time admitted to hospital: 11/6/2018  4:35 PM        * Pneumonia due to infectious organism   Assessment & Plan    · Will treat as health-care associated pneumonia- patient is an assisted living residence  · blood culture and sputum culture are pending   · urine Legionella, strep pneumo are negative   · Will trend procalcitonin  · Titrate oxygen  · I discussed medication allergies with patient and his daughter and son  Patient does not recognize that he had any penicillin allergy  They do not believe that he is allergic to ciprofloxacin either  · Will change his antibiotic regimen to cefepime and vancomycin  · Encourage cough and deep breathing, early ambulation, acapella and IS  Acute on chronic respiratory failure with hypoxia (HCC)   Assessment & Plan    · Likely exacerbated by pneumonia as noted above  · Will treat with antibiotics as noted above  · Patient does not appear to be in any COPD exacerbation at this time     Chronic indwelling Acevedo catheter   Assessment & Plan    · Urinalysis shows suspected urine tract infection  · Pending urine culture     COPD (chronic obstructive pulmonary disease) ([facility] [address] )   Assessment & Plan    · Patient has history of COPD on chronic home oxygen of 2 L  · Does not appear to be in any COPD exacerbation at this time  · Continue home inhalers along with nebulizers p r n  VTE Pharmacologic Prophylaxis: Pharmacologic: Heparin    Patient Centered Rounds: I have performed bedside rounds with nursing staff today  Discussions with Specialists or Other Care Team Provider: nursing  Education and Discussions with Family / Patient:   Daughter and son    Time Spent for Care: 20 minutes  More than 50% of total time spent on counseling and coordination of care as described above      Current Length of Stay: 1 day(s)    Current Patient Status: Inpatient   Certification Statement: The patient will continue to require additional inpatient hospital stay due to Pneumonia    Discharge Plan: pending hospital course  Code Status: Level 3 - DNAR and DNI    Subjective:   Feeling somewhat better  Continues to feel congested  He has been coughing but unable to cough up anything  Denies any nausea vomiting or chest pain  Objective:     Vitals:   Temp (24hrs), Av 9 °F (37 7 °C), Min:99 1 °F (37 3 °C), Max:101 6 °F (38 7 °C)    Temp:  [99 1 °F (37 3 °C)-101 6 °F (38 7 °C)] 99 1 °F (37 3 °C)  HR:  [] 82  Resp:  [18-25] 22  BP: (102-163)/(61-85) 102/61  SpO2:  [87 %-99 %] 99 %  Body mass index is 22 32 kg/m²  Input and Output Summary (last 24 hours): Intake/Output Summary (Last 24 hours) at 18 1431  Last data filed at 18 1300   Gross per 24 hour   Intake              540 ml   Output              350 ml   Net              190 ml       Physical Exam:     Physical Exam   Constitutional: He is oriented to person, place, and time  He appears well-developed and well-nourished  No distress  HENT:   Head: Normocephalic and atraumatic  Mouth/Throat: Oropharynx is clear and moist    Eyes: Pupils are equal, round, and reactive to light  Conjunctivae and EOM are normal    Neck: Normal range of motion  Neck supple  No thyromegaly present  Cardiovascular: Normal rate, regular rhythm, normal heart sounds and intact distal pulses  Pulmonary/Chest: Effort normal  No respiratory distress  He has no wheezes  He has rales (in all lobes)  Abdominal: Bowel sounds are normal  He exhibits no distension  There is no tenderness  Musculoskeletal: Normal range of motion  He exhibits no edema or deformity  Neurological: He is alert and oriented to person, place, and time  He has normal reflexes  Skin: Skin is warm and dry  No erythema  Psychiatric: He has a normal mood and affect   His behavior is normal  Thought content normal    Vitals reviewed  Additional Data:     Labs:      Results from last 7 days  Lab Units 11/07/18  0507  11/06/18  1643   WBC Thousand/uL 6 80  --  7 60   HEMOGLOBIN g/dL 11 8*  --  12 4*   HEMATOCRIT % 36 7  --  38 7   PLATELETS Thousands/uL 157  < > 157   NEUTROS PCT %  --   --  53   LYMPHS PCT %  --   --  36   MONOS PCT %  --   --  10   EOS PCT %  --   --  0   < > = values in this interval not displayed  Results from last 7 days  Lab Units 11/07/18  0507   POTASSIUM mmol/L 3 8   CHLORIDE mmol/L 99   CO2 mmol/L 29   BUN mg/dL 16   CREATININE mg/dL 0 85   CALCIUM mg/dL 8 4*   ALK PHOS U/L 49*   ALT U/L 15   AST U/L 27       Results from last 7 days  Lab Units 11/06/18  1643   INR  1 09               * I Have Reviewed All Lab Data Listed Above  * Additional Pertinent Lab Tests Reviewed:  [address] Admission  Reviewed    Imaging:  Imaging Reports Reviewed Today Include: CXR    Recent Cultures (last 7 days):       Results from last 7 days  Lab Units 11/06/18  2235 11/06/18  1704   INFLUENZA B PCR   --  None Detected   RSV PCR   --  None Detected   LEGIONELLA URINARY ANTIGEN  Negative  --        Last 24 Hours Medication List:     Current Facility-Administered Medications:  acetaminophen 650 mg Oral Q6H PRN Celso Goins MD    azithromycin 500 mg Intravenous Q24H Larry Mohamud PA-C Last Rate: 500 mg (11/06/18 1273)   budesonide-formoterol 2 puff Inhalation BID Celso Goins MD    cefepime 1,000 mg Intravenous Q12H SEEMA Galvin    heparin (porcine) 5,000 Units Subcutaneous Pending sale to [facility] Celso Goins MD    ipratropium-albuterol 3 mL Nebulization Q6H Celso Goins MD    meclizine 25 mg Oral Q8H PRN Celso Goins MD    oxybutynin 10 mg Oral HS Celso Goins MD    sodium chloride 75 mL/hr Intravenous Continuous Celso Goins MD Last Rate: 75 mL/hr (11/06/18 2036)   vancomycin 1,000 mg Intravenous Q12H SEEMA Galvin         Today, Patient Was Seen By: Myron Butler Lou Turcios    ** Please Note: Dictation voice to text software may have been used in the creation of this document   **

## 2018-11-07 NOTE — RESPIRATORY THERAPY NOTE
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     [facility]     Hypercholesteremia     Hyperlipidemia     Malignant [facility]oplasm prostate ([facility] )     Phimosis     Stress incontinence     Urinary retention     Urinary urgency      Social History     Social History    Marital status: /Civil Union     Spouse name: N/A    Number of children: N/A    Years of education: N/A     Social History Main Topics    Smoking status: Former Smoker     Packs/day: 0 50     Types: Cigarettes     Start date: 1947    Smokeless tobacco: Never Used    Alcohol use No    Drug use: No    Sexual activity: Not Asked     Other Topics Concern    None     Social History Narrative    None       Subjective         Objective    Physical Exam:   Assessment Type: (P) Assess only  General Appearance: (P) Alert, Awake, Other (Comment)  Respiratory Pattern: (P) Dyspnea at rest  Chest Assessment: (P) Chest expansion symmetrical  Bilateral Breath Sounds: (P) Coarse, Rhonchi  Cough: (P) Non-productive, Moist    Vitals:  Blood pressure 163/85, pulse (!) 139, temperature 99 1 °F (37 3 °C), temperature source Tympanic, resp  rate (P) 22, height 5' 11" (1 803 m), weight 72 6 kg (160 lb), SpO2 (P) 93 %  Imaging and other studies: I have personally reviewed pertinent reports              Plan    Respiratory Plan: (P) Mild Distress pathway        Resp Comments: pt  has a very loose non-productive cough

## 2018-11-08 ENCOUNTER — APPOINTMENT (INPATIENT)
Dept: RADIOLOGY | Facility: HOSPITAL | Age: 83
DRG: 193 | End: 2018-11-08
Payer: MEDICARE

## 2018-11-08 LAB
ANION GAP SERPL CALCULATED.3IONS-SCNC: 6 MMOL/L (ref 4–13)
BACTERIA UR CULT: NORMAL
BUN SERPL-MCNC: 13 MG/DL (ref 7–25)
CALCIUM SERPL-MCNC: 8.2 MG/DL (ref 8.6–10.5)
CHLORIDE SERPL-SCNC: 101 MMOL/L (ref 98–107)
CO2 SERPL-SCNC: 28 MMOL/L (ref 21–31)
CREAT SERPL-MCNC: 0.86 MG/DL (ref 0.7–1.3)
ERYTHROCYTE [DISTWIDTH] IN BLOOD BY AUTOMATED COUNT: 15.5 % (ref 11.5–14.5)
GFR SERPL CREATININE-BSD FRML MDRD: 75 ML/MIN/1.73SQ M
GLUCOSE SERPL-MCNC: 87 MG/DL (ref 65–99)
HCT VFR BLD AUTO: 36.3 % (ref 36.5–49.3)
HGB BLD-MCNC: 11.4 G/DL (ref 14–18)
MCH RBC QN AUTO: 26 PG (ref 26–34)
MCHC RBC AUTO-ENTMCNC: 31.5 G/DL (ref 31–37)
MCV RBC AUTO: 83 FL (ref 81–99)
MRSA NOSE QL CULT: NORMAL
PLATELET # BLD AUTO: 166 THOUSANDS/UL (ref 149–390)
PMV BLD AUTO: 10.7 FL (ref 8.6–11.7)
POTASSIUM SERPL-SCNC: 3.8 MMOL/L (ref 3.5–5.5)
PROCALCITONIN SERPL-MCNC: 0.73 NG/ML
RBC # BLD AUTO: 4.39 MILLION/UL (ref 4.3–5.9)
SODIUM SERPL-SCNC: 135 MMOL/L (ref 134–143)
WBC # BLD AUTO: 8.4 THOUSAND/UL (ref 4.8–10.8)

## 2018-11-08 PROCEDURE — G8979 MOBILITY GOAL STATUS: HCPCS

## 2018-11-08 PROCEDURE — 71046 X-RAY EXAM CHEST 2 VIEWS: CPT

## 2018-11-08 PROCEDURE — 87205 SMEAR GRAM STAIN: CPT | Performed by: PHYSICIAN ASSISTANT

## 2018-11-08 PROCEDURE — 80048 BASIC METABOLIC PNL TOTAL CA: CPT | Performed by: NURSE PRACTITIONER

## 2018-11-08 PROCEDURE — G8988 SELF CARE GOAL STATUS: HCPCS

## 2018-11-08 PROCEDURE — 97116 GAIT TRAINING THERAPY: CPT

## 2018-11-08 PROCEDURE — 87070 CULTURE OTHR SPECIMN AEROBIC: CPT | Performed by: PHYSICIAN ASSISTANT

## 2018-11-08 PROCEDURE — 84145 PROCALCITONIN (PCT): CPT | Performed by: NURSE PRACTITIONER

## 2018-11-08 PROCEDURE — G8987 SELF CARE CURRENT STATUS: HCPCS

## 2018-11-08 PROCEDURE — 99232 SBSQ HOSP IP/OBS MODERATE 35: CPT | Performed by: NURSE PRACTITIONER

## 2018-11-08 PROCEDURE — 94640 AIRWAY INHALATION TREATMENT: CPT

## 2018-11-08 PROCEDURE — 97163 PT EVAL HIGH COMPLEX 45 MIN: CPT

## 2018-11-08 PROCEDURE — 85027 COMPLETE CBC AUTOMATED: CPT | Performed by: NURSE PRACTITIONER

## 2018-11-08 PROCEDURE — 94668 MNPJ CHEST WALL SBSQ: CPT

## 2018-11-08 PROCEDURE — 94760 N-INVAS EAR/PLS OXIMETRY 1: CPT

## 2018-11-08 PROCEDURE — 97166 OT EVAL MOD COMPLEX 45 MIN: CPT

## 2018-11-08 PROCEDURE — G8978 MOBILITY CURRENT STATUS: HCPCS

## 2018-11-08 RX ORDER — LEVALBUTEROL 1.25 MG/.5ML
1.25 SOLUTION, CONCENTRATE RESPIRATORY (INHALATION)
Status: DISCONTINUED | OUTPATIENT
Start: 2018-11-08 | End: 2018-11-08

## 2018-11-08 RX ORDER — FUROSEMIDE 10 MG/ML
40 INJECTION INTRAMUSCULAR; INTRAVENOUS ONCE
Status: COMPLETED | OUTPATIENT
Start: 2018-11-08 | End: 2018-11-08

## 2018-11-08 RX ORDER — SODIUM CHLORIDE FOR INHALATION 0.9 %
3 VIAL, NEBULIZER (ML) INHALATION 4 TIMES DAILY
Status: DISCONTINUED | OUTPATIENT
Start: 2018-11-08 | End: 2018-11-09

## 2018-11-08 RX ORDER — LEVALBUTEROL 1.25 MG/.5ML
1.25 SOLUTION, CONCENTRATE RESPIRATORY (INHALATION) 4 TIMES DAILY
Status: DISCONTINUED | OUTPATIENT
Start: 2018-11-08 | End: 2018-11-09

## 2018-11-08 RX ADMIN — FUROSEMIDE 40 MG: 10 INJECTION, SOLUTION INTRAVENOUS at 15:46

## 2018-11-08 RX ADMIN — VANCOMYCIN HYDROCHLORIDE 750 MG: 1 INJECTION, POWDER, LYOPHILIZED, FOR SOLUTION INTRAVENOUS at 18:31

## 2018-11-08 RX ADMIN — CEFEPIME HYDROCHLORIDE 1000 MG: 1 INJECTION, SOLUTION INTRAVENOUS at 16:30

## 2018-11-08 RX ADMIN — HEPARIN SODIUM 5000 UNITS: 5000 INJECTION INTRAVENOUS; SUBCUTANEOUS at 05:03

## 2018-11-08 RX ADMIN — VANCOMYCIN HYDROCHLORIDE 750 MG: 1 INJECTION, POWDER, LYOPHILIZED, FOR SOLUTION INTRAVENOUS at 07:02

## 2018-11-08 RX ADMIN — OXYBUTYNIN CHLORIDE 10 MG: 5 TABLET, EXTENDED RELEASE ORAL at 22:03

## 2018-11-08 RX ADMIN — LEVALBUTEROL HYDROCHLORIDE 1.25 MG: 1.25 SOLUTION, CONCENTRATE RESPIRATORY (INHALATION) at 19:21

## 2018-11-08 RX ADMIN — CEFEPIME HYDROCHLORIDE 1000 MG: 1 INJECTION, SOLUTION INTRAVENOUS at 05:02

## 2018-11-08 RX ADMIN — GUAIFENESIN 600 MG: 600 TABLET, EXTENDED RELEASE ORAL at 21:55

## 2018-11-08 RX ADMIN — IPRATROPIUM BROMIDE AND ALBUTEROL SULFATE 3 ML: .5; 3 SOLUTION RESPIRATORY (INHALATION) at 14:17

## 2018-11-08 RX ADMIN — HEPARIN SODIUM 5000 UNITS: 5000 INJECTION INTRAVENOUS; SUBCUTANEOUS at 15:46

## 2018-11-08 RX ADMIN — BUDESONIDE AND FORMOTEROL FUMARATE DIHYDRATE 2 PUFF: 160; 4.5 AEROSOL RESPIRATORY (INHALATION) at 10:32

## 2018-11-08 RX ADMIN — METOPROLOL TARTRATE 5 MG: 1 INJECTION, SOLUTION INTRAVENOUS at 17:46

## 2018-11-08 RX ADMIN — BUDESONIDE AND FORMOTEROL FUMARATE DIHYDRATE 2 PUFF: 160; 4.5 AEROSOL RESPIRATORY (INHALATION) at 18:31

## 2018-11-08 RX ADMIN — HEPARIN SODIUM 5000 UNITS: 5000 INJECTION INTRAVENOUS; SUBCUTANEOUS at 22:03

## 2018-11-08 RX ADMIN — ISODIUM CHLORIDE 3 ML: 0.03 SOLUTION RESPIRATORY (INHALATION) at 19:22

## 2018-11-08 RX ADMIN — IPRATROPIUM BROMIDE AND ALBUTEROL SULFATE 3 ML: .5; 3 SOLUTION RESPIRATORY (INHALATION) at 07:26

## 2018-11-08 RX ADMIN — GUAIFENESIN 600 MG: 600 TABLET, EXTENDED RELEASE ORAL at 05:02

## 2018-11-08 NOTE — PLAN OF CARE
Problem: OCCUPATIONAL THERAPY ADULT  Goal: Performs self-care activities at highest level of function for planned discharge setting  See evaluation for individualized goals  Treatment Interventions: ADL retraining, Functional transfer training, Endurance training, Energy conservation  Equipment Recommended:  (RW)    See flowsheet documentation for full assessment, interventions and recommendations  Norma Gann MS, OTR/L    Outcome: Progressing  Limitation: Decreased ADL status, Decreased endurance, Decreased self-care trans, Decreased high-level ADLs  Prognosis: Good  Assessment: Pt is a 80 y o  male seen for OT evaluation s/p admit to [address] [place] [address] on 11/6/2018 w/ Pneumonia due to infectious organism  Comorbidities affecting pt's functional performance at time of assessment include: COPD, Hypercholesteremia, Stress incontinence, Urinary retention  Personal factors affecting pt at time of IE include:difficulty performing ADLS  Prior to admission, pt was I with ADLs  Upon evaluation: the following deficits impact occupational performance: decreased balance and decreased tolerance  Pt to benefit from continued skilled OT tx while in the hospital to address deficits as defined above and maximize level of functional independence w ADL's and functional mobility  Occupational Performance areas to address include: bathing/shower, toilet hygiene, dressing and functional mobility  From OT standpoint, recommendation at time of d/c would be Home OT at [facility]       OT Discharge Recommendation: Home OT  OT - OK to Discharge: Yes (when medically cleared)    Norma Gann MS, OTR/L

## 2018-11-08 NOTE — PLAN OF CARE
Problem: PHYSICAL THERAPY ADULT  Goal: Performs mobility at highest level of function for planned discharge setting  See evaluation for individualized goals  Treatment/Interventions: Functional transfer training, LE strengthening/ROM, Therapeutic exercise, Endurance training, Patient/family training, Equipment eval/education, Bed mobility, Gait training, Spoke to advanced practitioner, OT  Equipment Recommended: James Mathis, PT    See flowsheet documentation for full assessment, interventions and recommendations  Prognosis: Good  Problem List: Decreased strength, Decreased endurance, Impaired balance, Decreased mobility, Decreased coordination, Impaired judgement, Decreased safety awareness, Impaired hearing  Assessment: Pt is 80 y o  male seen for PT evaluation s/p admit to [address] on 11/6/2018 w/ Pneumonia due to infectious organism  PT consulted to assess pt's functional mobility and d/c needs  Order placed for PT eval and tx, w/ activity as tolerated order  Comorbidities affecting pt's physical performance at time of assessment include: weakness, pneumonia, acute on CHR w/hypoxia, COPD  PTA, pt was independent w/ all functional mobility w/ o AD and lives w/ facility staff, spouse in one level care home  Personal factors affecting pt at time of IE include: ambulating w/ assistive device, inability to navigate community distances, inability to navigate level surfaces w/o external assistance, positive fall history, hearing impairments, inability to perform IADLs and inability to perform ADLs  Please find objective findings from PT assessment regarding body systems outlined above with impairments and limitations including weakness, impaired balance, decreased endurance, impaired coordination, gait deviations, decreased activity tolerance, decreased functional mobility tolerance, decreased safety awareness, impaired judgement, fall risk and SOB upon exertion   The following objective measures performed on IE also reveal limitations: Barthel Index: 35/100  Pt's clinical presentation is currently unstable/unpredictable  Pt to benefit from continued PT tx to address deficits as defined above and maximize level of functional independent mobility and consistency  From PT/mobility standpoint, recommendation at time of d/c would be Home PT and return to [facility] pending progress in order to facilitate return to PLOF  Recommendation: Home PT, Other (Comment) (return to longterm)     PT - OK to Discharge: Yes (When medically stable)    See flowsheet documentation for full assessment     Manasa Neves, PT

## 2018-11-08 NOTE — ASSESSMENT & PLAN NOTE
· Will treat as health-care associated pneumonia- patient is an assisted living resident  · blood culture show no growth today   · sputum culture is pending   · urine Legionella, strep pneumo are negative   · procalcitonin is trending down   · Continue with oxygen  · I discussed medication allergies with patient and his daughter and son  Patient does not recognize that he had any penicillin allergy  They do not believe that he is allergic to ciprofloxacin either  · Continue with cefepime and vancomycin  No allergic reactions to cefepime  · Encourage cough and deep breathing, early ambulation, acapella and IS

## 2018-11-08 NOTE — OCCUPATIONAL THERAPY NOTE
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     [facility]ronic [facility]) [facility]     Hypercholesteremia     Hyperlipidemia     [facility] )     Phimosis     Stress incontinence     Urinary retention     Urinary urgency        Past Surgical History:   Procedure Laterality Date    BLADDER FULGURATION  2012, 2013    [place], [place]  2013   [address]    TOTAL HIP ARTHROPLASTY Left 2012    URINARY SPHINCTER IMPLANT  1996    Repaired in 2002; Removed in 2012    UROFLOWMETRY SIMPLE / COMPLEX  1996 11/08/18 4349   Note Type   Note type Eval only   Restrictions/Precautions   Weight Bearing Precautions Per Order No   Other Precautions Fall Risk;Hard of hearing;O2  (3L of O2, usually on 2L of O2 24/7)   Pain Assessment   Pain Assessment No/denies pain   Pain Score No Pain   Home Living   Type of Home Assisted living   Home Layout One level;Elevator; Access; Ramped entrance   [employer] Raised   Bathroom Equipment Grab bars in shower;Grab bars around toilet; Shower chair   Bathroom Accessibility Accessible via walker   Home Equipment (No AD)   Prior Function   Level of [place] Independent with ADLs and functional mobility; Needs assistance with IADLs   Lives With Spouse; Facility staff  (wife there as well)   Receives Help From Personal care attendant   ADL Assistance Independent   IADLs Needs assistance   Falls in the last 6 months 1 to 4  (March 2018, w/o fx)   Vocational Retired   [employer] Mobility   Supine to Sit 2  Maximal assistance   Additional items Assist x 1;HOB elevated;Verbal cues;LE management   Transfers   Sit to Stand 4  Minimal assistance   Additional items Assist x 1;Bedrails   Stand to Sit 4  Minimal assistance   Additional items Assist x 1;Verbal cues   Functional Mobility   Functional Mobility 4  Minimal assistance   Additional Comments 30ft   Additional items Rolling walker   Balance   Static Sitting Good   Dynamic Sitting Fair +   Static Standing Fair   Dynamic Standing Fair   Ambulatory Fair -   Activity Tolerance   Activity Tolerance Patient limited by fatigue   Medical Staff Made Aware Yes, Cele STEPHENSON   RUE Assessment   RUE Assessment WFL   LUE Assessment   LUE Assessment WFL   Cognition   Overall Cognitive Status WFL   Arousal/Participation Alert; Cooperative   Orientation Level Oriented X4   Memory Decreased recall of recent events   Following Commands Follows one step commands with increased time or repetition   Assessment   Limitation Decreased ADL status; Decreased endurance;Decreased self-care trans;Decreased high-level ADLs   Prognosis Good   Assessment Pt is a 80 y o  male seen for OT evaluation s/p admit to [address] [place] [address] on 11/6/2018 w/ Pneumonia due to infectious organism  Comorbidities affecting pt's functional performance at time of assessment include: COPD, Hypercholesteremia, Stress incontinence, Urinary retention  Personal factors affecting pt at time of IE include:difficulty performing ADLS  Prior to admission, pt was I with ADLs  Upon evaluation: the following deficits impact occupational performance: decreased balance and decreased tolerance  Pt to benefit from continued skilled OT tx while in the hospital to address deficits as defined above and maximize level of functional independence w ADL's and functional mobility  Occupational Performance areas to address include: bathing/shower, toilet hygiene, dressing and functional mobility  From OT standpoint, recommendation at time of d/c would be Home OT at [facility] Patient Goals feel better   Plan   Treatment Interventions ADL retraining;Functional transfer training; Endurance training;Energy conservation   Goal Expiration Date 11/13/18   Treatment Day 0   OT Frequency 3-5x/wk   Recommendation   OT Discharge Recommendation Home OT   Equipment Recommended (RW)   OT - OK to Discharge Yes  (when medically cleared)   Barthel Index   Feeding 5   Bathing 0   Grooming Score 5   Dressing Score 5   Bladder Score 0  (pedraza)   Bowels Score 5   Toilet Use Score 5   Transfers (Bed/Chair) Score 10   Mobility (Level Surface) Score 0   Stairs Score 0   Barthel Index Score 35     GOALS:    Pt will achieve the following within specified time frame: STG  Pt will achieve the following goals within 5 days    *ADL transfers with (S) for inc'd independence with ADLs/purposeful tasks    *UB ADL with (S) for inc'd independence with self cares    *LB ADL with Min (A) using AE prn for inc'd independence with self cares    *Toileting with CGA for clothing management and hygiene for return to PLOF with personal care    *Increase static stand balance and dyn stand balance to F+ for inc'd safety with standing purposeful tasks    *Increase stand tolerance x7 m for inc'd tolerance with standing purposeful tasks    *Participate in 10m UE therex to increase overall stamina/activity tolerance for purposeful tasks    *Bed mobility- Mod (A) for inc'd independence to manage own comfort and initiate EOB & OOB purposeful tasks      Myrisa Georgeana Holstein, MS, OTR/L

## 2018-11-08 NOTE — PROGRESS NOTES
Vancomycin IV Pharmacy-to-Dose Consultation    Rodrick Goncalves is a 80 y o  male who is currently receiving Vancomycin IV scheduled dosing with management by the Pharmacy Consult service  Assessment/Plan:  The patient was reviewed  Renal function is stable and no signs or symptoms of nephrotoxicity and/or infusion reactions were documented in the chart  Based on todays assessment, continue current vancomycin (day # 2) dosing of  vancomycin 750 mg iv q 12 hours with a plan for trough to be drawn at 1500 on 11/9/18  We will continue to follow the patients culture results and clinical progress daily      Lita Barkley, Pharmacist

## 2018-11-08 NOTE — PHYSICAL THERAPY NOTE
Physical Therapy Evaluation     Patient's Name: Catina Rodriguez    Admitting Diagnosis  Atrial fibrillation ([place] [address] ) [I48 91]  Shortness of breath [R06 02]  COPD (chronic obstructive pulmonary disease) ([place] [address] ) [J44 9]  Pneumonia [J18 9]  SOB (shortness of breath) [R06 02]  Hypoxia [R09 02]    Problem List  Patient Active Problem List   Diagnosis    Acute on chronic respiratory failure with hypoxia (HCC)    Pneumonia due to infectious organism    COPD (chronic obstructive pulmonary disease) ([place] [address] )    Chronic indwelling Acevedo catheter       Past Medical History  Past Medical History:   Diagnosis Date    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     COPD (chronic obstructive pulmonary disease) ([place] [address] )     Hypercholesteremia     Hyperlipidemia     Malignant neoplasm prostate ([place] [address] )     Phimosis     Stress incontinence     Urinary retention     Urinary urgency        Past Surgical History  Past Surgical History:   Procedure Laterality Date    BLADDER FULGURATION  2012, 2013    [place], [place]  2013   [address]    TOTAL HIP ARTHROPLASTY Left 2012    URINARY SPHINCTER IMPLANT  1996    Repaired in 2002; Removed in 2012    UROFLOWMETRY SIMPLE / COMPLEX  1996 11/08/18 0831   Note Type   Note type Eval/Treat   Pain Assessment   Pain Assessment No/denies pain   Pain Score No Pain   Home Living   Type of Home Assisted living   Home Layout One level;Elevator; Access; Ramped entrance   [employer] Raised   Bathroom Equipment Grab bars in shower;Grab bars around toilet; Shower chair   Bathroom Accessibility Accessible via walker   Home Equipment (reports he does not use an AD)   Additional Comments (2 L O2 24/7)   Prior Function   Level of [place] Independent with ADLs and functional mobility; Needs assistance with IADLs   Lives With Spouse; Facility staff  (wife there as well)   Receives Help From Personal care attendant   ADL Assistance Independent IADLs Needs assistance   Falls in the last 6 months 1 to 4  (March 2018, w/o fx)   Vocational Retired   Restrictions/Precautions   [place] Brittany Bearing Precautions Per Order No   Other Precautions Fall Risk;Hard of hearing;O2   General   Family/Caregiver Present No   Cognition   Overall Cognitive Status WFL   Arousal/Participation Alert   Orientation Level Oriented X4   Memory Decreased recall of recent events   Following Commands Follows one step commands with increased time or repetition   RUE Assessment   RUE Assessment WFL   LUE Assessment   LUE Assessment WFL   RLE Assessment   RLE Assessment X   Strength RLE   R Hip Flexion 3/5   R Knee Extension 3/5   R Ankle Dorsiflexion 3/5   LLE Assessment   LLE Assessment X   Strength LLE   L Hip Flexion 3/5   L Knee Extension 3/5   L Ankle Dorsiflexion 3/5   Bed Mobility   Supine to Sit 2  Maximal assistance   Additional items Assist x 1;HOB elevated;Verbal cues;LE management   Transfers   Sit to Stand 4  Minimal assistance   Additional items Assist x 1;Bedrails   Stand to Sit 4  Minimal assistance   Additional items Assist x 1;Verbal cues   Ambulation/Elevation   Gait pattern Forward Flexion;Decreased foot clearance; Improper Weight shift; Short stride   Gait Assistance 4  Minimal assist   Additional items Assist x 1;Verbal cues; Tactile cues  (TC's to maintain balance,facilitate directional changes w/RW)   Assistive Device Rolling walker   Distance 30 feet   Stair Management Assistance Not tested   Balance   Static Sitting Good   Dynamic Sitting Fair +   Static Standing Fair   Dynamic Standing Fair   Ambulatory Fair -   Endurance Deficit   Endurance Deficit Yes   Endurance Deficit Description increase fatigue, and SOB upon return to chair  POX: 88% ON 2 L O2 w/   Upon 1 minute of rest, patient's POX was 93% on 2 L O2   Activity Tolerance   Activity Tolerance Patient limited by fatigue   Medical Staff Made Aware Yes, [place] CRNP   Assessment   Prognosis Good   Problem List Decreased strength;Decreased endurance; Impaired balance;Decreased mobility; Decreased coordination; Impaired judgement;Decreased safety awareness; Impaired hearing   Assessment Pt is 80 y o  male seen for PT evaluation s/p admit to [facility] on 11/6/2018 w/ Pneumonia due to infectious organism  PT consulted to assess pt's functional mobility and d/c needs  Order placed for PT eval and tx, w/ activity as tolerated order  Comorbidities affecting pt's physical performance at time of assessment include: weakness, pneumonia, acute on CHR w/hypoxia, COPD  PTA, pt was independent w/ all functional mobility w/ o AD and lives w/ facility staff, spouse in one level senior living  Personal factors affecting pt at time of IE include: ambulating w/ assistive device, inability to navigate community distances, inability to navigate level surfaces w/o external assistance, positive fall history, hearing impairments, inability to perform IADLs and inability to perform ADLs  Please find objective findings from PT assessment regarding body systems outlined above with impairments and limitations including weakness, impaired balance, decreased endurance, impaired coordination, gait deviations, decreased activity tolerance, decreased functional mobility tolerance, decreased safety awareness, impaired judgement, fall risk and SOB upon exertion  The following objective measures performed on IE also reveal limitations: Barthel Index: 35/100  Pt's clinical presentation is currently unstable/unpredictable  Pt to benefit from continued PT tx to address deficits as defined above and maximize level of functional independent mobility and consistency  From PT/mobility standpoint, recommendation at time of d/c would be Home PT and return to senior living pending progress in order to facilitate return to PLOF     Goals   Patient Goals feel better   STG Expiration Date 11/13/18   Short Term Goal #1 1 )Patient will complete bed mobility with supervision of 1 for decrease need for caregiver assistance, decrease burden of care  2 ) Patient will complete transfers with supervision of 1 to decrease risk of falls, facilitate upright standing posture  3 ) BLE strength to greater than/equal to 3+/5 gross musculature to increase ability to safely transfer, control descent to chair  4 ) Patient will exhibit increase dynamic standing balance to Fair+ , for 2-3 minutes without LOB and supervision of 1 to improve activity tolerance  5 ) Patient will exhibit increase dynamic ambulatory balance to Fair for 100 feet with RW, supervision of 1  to improve ability to mobilize to toilet, chair and decrease risk for additional medical complications  6 ) Patient will exhibit good self monitoring and ability to follow 2 step commands to increase complexity of tasks and resume ADL's without LOB  LTG Expiration Date 11/18/18   Long Term Goal #1 1 )Patient will complete bed mobility with supervision of 1 for decrease need for caregiver assistance, decrease burden of care  2 ) Patient will complete transfers with supervision of 1 to decrease risk of falls, facilitate upright standing posture  3 ) BLE strength to greater than/equal to 3+/5 gross musculature to increase ability to safely transfer, control descent to chair  4 ) Patient will exhibit increase dynamic standing balance to Fair+ , for 3-4  minutes without LOB and supervision of 1 to improve activity tolerance  5 ) Patient will exhibit increase dynamic ambulatory balance to Fair+ for 150 feet with RW, supervision of 1  to improve ability to mobilize to toilet, chair and decrease risk for additional medical complications  6 ) Patient will exhibit good self monitoring and ability to follow 2 step commands to increase complexity of tasks and resume ADL's without LOB  Treatment Day 1   Plan   Treatment/Interventions Functional transfer training;LE strengthening/ROM; Therapeutic exercise; Endurance training;Patient/family training;Equipment eval/education; Bed mobility;Gait training;Spoke to advanced practitioner;OT   PT Frequency 5x/wk   Recommendation   Recommendation Home PT; Other (Comment)  (return to [facility])   Equipment Recommended Walker   PT - OK to Discharge Yes  (When medically stable)   Additional Comments Upon conclusion, patient was resting in chair with all needs within reach on tray  Barthel Index   Feeding 5   Bathing 0   Grooming Score 5   Dressing Score 5   Bladder Score 0   Bowels Score 5   Toilet Use Score 5   Transfers (Bed/Chair) Score 10   Mobility (Level Surface) Score 0   Stairs Score 0   Barthel Index Score 35   Additional skilled interventions: gait training x 10 minutes    S: Patient agreeable and eager to participate in gait training, provided an appropriately adjusted RW, no pain  O:Patient ambulated 30 feet with RW, min A of 1 with verbal cues on proper RW usage, maintaining position with directional changes, and safety awareness  Tactile cues were also provided to maintain safety throughout session, due to intermittent balance perturbations with external tactile correcting  A:Patient responded well to provided education and demonstrated increased acuity with RW usage upon conclusion  P:Gait training will be continued with progressed distances as patient can safely tolerate, recommended RW upon discharge    Treatment Time: [id number]    Rico Thompson, PT

## 2018-11-08 NOTE — ASSESSMENT & PLAN NOTE
· Likely exacerbated by pneumonia as noted above  · Will treat with antibiotics as noted above  · Patient does not appear to be in any COPD exacerbation at this time  · Continue on oxygen supplement

## 2018-11-08 NOTE — SOCIAL WORK
Pt discussed during care coordination rounds  Pt cleared for return to [facility] by PT after eval  CM dicussed same with Nursing director Hamlet Pena from [facility]  Pt eval faxed for review  Hamlet Pena reports she does not need to see pt face to face before he returns  Pt did recommnded Catalina Perez for PT/OT  Pt already active with [facility]  Pt would like same for PT/OT  Referral made to [facility] Catalina Perez to add same  Discussed with pt son Neri Padron  He will transport pt back to [facility] upon discharge  CM to follow

## 2018-11-08 NOTE — PLAN OF CARE
DISCHARGE PLANNING     Discharge to home or other facility with appropriate resources Progressing        DISCHARGE PLANNING - CARE MANAGEMENT     Discharge to post-acute care or home with appropriate resources Progressing        INFECTION - ADULT     Absence or prevention of progression during hospitalization Progressing     Absence of fever/infection during neutropenic period Progressing        Knowledge Deficit     Patient/family/caregiver demonstrates understanding of disease process, treatment plan, medications, and discharge instructions Progressing        PAIN - ADULT     Verbalizes/displays adequate comfort level or baseline comfort level Progressing        Potential for Falls     Patient will remain free of falls Progressing        Prexisting or High Potential for Compromised Skin Integrity     Skin integrity is maintained or improved Progressing        RESPIRATORY - ADULT     Achieves optimal ventilation and oxygenation Progressing        SAFETY ADULT     Maintain or return to baseline ADL function Progressing     Maintain or return mobility status to optimal level Progressing

## 2018-11-08 NOTE — ASSESSMENT & PLAN NOTE
· Patient has history of COPD on chronic home oxygen of 2 L  · Does not appear to be in any COPD exacerbation at this time  · Continue home inhalers along with Duoneb and nebulizers p r n

## 2018-11-08 NOTE — PROGRESS NOTES
Progress Note - Jayashree Bloodgood 11/15/1924, 80 y o  male MRN: [id number]    Unit/Bed#: -01 Encounter: [id number]    Primary Care Provider: Josefina Dukes   Date and time admitted to hospital: 11/6/2018  4:35 PM        * Pneumonia due to infectious organism   Assessment & Plan    · Will treat as health-care associated pneumonia- patient is an assisted living resident  · blood culture show no growth today   · sputum culture is pending   · urine Legionella, strep pneumo are negative   · procalcitonin is trending down   · Continue with oxygen  · I discussed medication allergies with patient and his daughter and son  Patient does not recognize that he had any penicillin allergy  They do not believe that he is allergic to ciprofloxacin either  · Continue with cefepime and vancomycin  No allergic reactions to cefepime  · Encourage cough and deep breathing, early ambulation, acapella and IS  Acute on chronic respiratory failure with hypoxia (HCC)   Assessment & Plan    · Likely exacerbated by pneumonia as noted above  · Will treat with antibiotics as noted above  · Patient does not appear to be in any COPD exacerbation at this time  · Continue on oxygen supplement      Chronic indwelling Pedraza catheter   Assessment & Plan    · Urinalysis shows suspected urine tract infection  · Pending urine culture     COPD (chronic obstructive pulmonary disease) ([facility] [address] )   Assessment & Plan    · Patient has history of COPD on chronic home oxygen of 2 L  · Does not appear to be in any COPD exacerbation at this time  · Continue home inhalers along with Duoneb and nebulizers p r n  Suspected Urinary tract infection   · Pending urine culture  · Chronic pedraza   · Currently on antibiotics    VTE Pharmacologic Prophylaxis: Pharmacologic: Heparin    Patient Centered Rounds: I have performed bedside rounds with nursing staff today      Discussions with Specialists or Other Care Team Provider: nursing   Education and Discussions with Family / Patient: patient, son, and daughter     Time Spent for Care: 20 minutes  More than 50% of total time spent on counseling and coordination of care as described above  Current Length of Stay: 2 day(s)    Current Patient Status: Inpatient   Certification Statement: The patient will continue to require additional inpatient hospital stay due to pneumonia     Discharge Plan: pending hospital course     Code Status: Level 3 - DNAR and DNI    Subjective:   Feeling okay  Continue to cough- non-productive  Continues to have SOB worse with ambulation  Denies any allergic reactions from abx  Objective:     Vitals:   Temp (24hrs), Av 5 °F (37 5 °C), Min:98 2 °F (36 8 °C), Max:102 7 °F (39 3 °C)    Temp:  [98 2 °F (36 8 °C)-102 7 °F (39 3 °C)] 100 1 °F (37 8 °C)  HR:  [] 115  Resp:  [18-22] 18  BP: (118-142)/(61-86) 142/86  SpO2:  [92 %-99 %] 92 %  Body mass index is 22 32 kg/m²  Input and Output Summary (last 24 hours): Intake/Output Summary (Last 24 hours) at 18 1118  Last data filed at 18 0700   Gross per 24 hour   Intake              540 ml   Output              525 ml   Net               15 ml       Physical Exam:     Physical Exam   Constitutional: He is oriented to person, place, and time  He appears well-developed  No distress  Frail elderly male      HENT:   Head: Normocephalic and atraumatic  Mouth/Throat: Oropharynx is clear and moist    Eyes: Pupils are equal, round, and reactive to light  Conjunctivae and EOM are normal    Neck: Normal range of motion  Neck supple  No thyromegaly present  Cardiovascular: Normal rate, regular rhythm, normal heart sounds and intact distal pulses  Pulmonary/Chest: Effort normal  No respiratory distress  He has no wheezes  He has rales (rhonchi throughout the lung fields)  Abdominal: Soft  Bowel sounds are normal  He exhibits no distension  There is no tenderness     Genitourinary:   Genitourinary Comments: Suprapubic catheter, clear yellow urine      Musculoskeletal: Normal range of motion  He exhibits no edema or deformity  Neurological: He is alert and oriented to person, place, and time  He has normal reflexes  Skin: Skin is warm and dry  No erythema  Psychiatric: He has a normal mood and affect  His behavior is normal  Thought content normal    Vitals reviewed  Additional Data:     Labs:      Results from last 7 days  Lab Units 11/08/18  0457  11/06/18  1643   WBC Thousand/uL 8 40  < > 7 60   HEMOGLOBIN g/dL 11 4*  < > 12 4*   HEMATOCRIT % 36 3*  < > 38 7   PLATELETS Thousands/uL 166  < > 157   NEUTROS PCT %  --   --  53   LYMPHS PCT %  --   --  36   MONOS PCT %  --   --  10   EOS PCT %  --   --  0   < > = values in this interval not displayed  Results from last 7 days  Lab Units 11/08/18  0457 11/07/18  0507   POTASSIUM mmol/L 3 8 3 8   CHLORIDE mmol/L 101 99   CO2 mmol/L 28 29   BUN mg/dL 13 16   CREATININE mg/dL 0 86 0 85   CALCIUM mg/dL 8 2* 8 4*   ALK PHOS U/L  --  49*   ALT U/L  --  15   AST U/L  --  27       Results from last 7 days  Lab Units 11/06/18  1643   INR  1 09               * I Have Reviewed All Lab Data Listed Above  * Additional Pertinent Lab Tests Reviewed: [address] Admission  Reviewed    Imaging:  Imaging Reports Reviewed Today Include: cxr    Recent Cultures (last 7 days):       Results from last 7 days  Lab Units 11/07/18  1026 11/06/18  2235 11/06/18  1705 11/06/18  1704   BLOOD CULTURE   --   --  No Growth at 24 hrs  No Growth at 24 hrs   --    SPUTUM CULTURE  Test not performed  Suggest repeat specimen  --   --   --    GRAM STAIN RESULT  >10 squamous epithelial cells/lpf, indicating orophayngeal contamination    4+ Polys  4+ Gram positive cocci in pairs  2+ Gram positive rods  2+ Gram positive cocci in clusters  1+ Budding Yeast with Pseudomycelia  1+ Gram positive cocci in chains  --   --   --    INFLUENZA B PCR   --   --   --  None Detected RSV PCR   --   --   --  None Detected   LEGIONELLA URINARY ANTIGEN   --  Negative  --   --        Last 24 Hours Medication List:     Current Facility-Administered Medications:  acetaminophen 650 mg Oral Q6H PRN Johnathon Banda MD    albuterol 2 5 mg Nebulization Q4H PRN SEEMA Trinidad    budesonide-formoterol 2 puff Inhalation BID Johnathon Banda MD    cefepime 1,000 mg Intravenous Q12H SEEMA Trinidad Last Rate: 1,000 mg (11/08/18 0502)   guaiFENesin 600 mg Oral Q12H [address] SEEMA Trinidad    heparin (porcine) 5,000 Units Subcutaneous [facility] Johnathon Banda MD    ipratropium-albuterol 3 mL Nebulization Q6H Johnathon Banda MD    meclizine 25 mg Oral Q8H PRN Johnathon Banda MD    metoprolol 5 mg Intravenous Q6H PRN SEEMA Trinidad    oxybutynin 10 mg Oral HS Johnathon Banda MD    vancomycin 10 mg/kg Intravenous Q12H SEEMA Trinidad Last Rate: 750 mg (11/08/18 0526)        Today, Patient Was Seen By: SEEMA Trinidad    ** Please Note: Dictation voice to text software may have been used in the creation of this document   **

## 2018-11-09 ENCOUNTER — APPOINTMENT (INPATIENT)
Dept: RADIOLOGY | Facility: HOSPITAL | Age: 83
DRG: 193 | End: 2018-11-09
Payer: MEDICARE

## 2018-11-09 ENCOUNTER — APPOINTMENT (INPATIENT)
Dept: NON INVASIVE DIAGNOSTICS | Facility: HOSPITAL | Age: 83
DRG: 193 | End: 2018-11-09
Payer: MEDICARE

## 2018-11-09 PROBLEM — I50.23 ACUTE ON CHRONIC SYSTOLIC CONGESTIVE HEART FAILURE (HCC): Status: ACTIVE | Noted: 2018-11-09

## 2018-11-09 PROBLEM — I50.22 CHRONIC SYSTOLIC CONGESTIVE HEART FAILURE (HCC): Status: ACTIVE | Noted: 2018-11-09

## 2018-11-09 LAB
ANION GAP SERPL CALCULATED.3IONS-SCNC: 6 MMOL/L (ref 4–13)
BNP SERPL-MCNC: 315 PG/ML (ref 1–100)
BUN SERPL-MCNC: 13 MG/DL (ref 7–25)
CALCIUM SERPL-MCNC: 8.3 MG/DL (ref 8.6–10.5)
CHLORIDE SERPL-SCNC: 99 MMOL/L (ref 98–107)
CO2 SERPL-SCNC: 30 MMOL/L (ref 21–31)
CREAT SERPL-MCNC: 0.82 MG/DL (ref 0.7–1.3)
ERYTHROCYTE [DISTWIDTH] IN BLOOD BY AUTOMATED COUNT: 15 % (ref 11.5–14.5)
GFR SERPL CREATININE-BSD FRML MDRD: 76 ML/MIN/1.73SQ M
GLUCOSE SERPL-MCNC: 91 MG/DL (ref 65–99)
HCT VFR BLD AUTO: 37.2 % (ref 36.5–49.3)
HGB BLD-MCNC: 11.5 G/DL (ref 14–18)
MCH RBC QN AUTO: 25.6 PG (ref 26–34)
MCHC RBC AUTO-ENTMCNC: 30.8 G/DL (ref 31–37)
MCV RBC AUTO: 83 FL (ref 81–99)
PLATELET # BLD AUTO: 197 THOUSANDS/UL (ref 149–390)
PMV BLD AUTO: 10.5 FL (ref 8.6–11.7)
POTASSIUM SERPL-SCNC: 3.7 MMOL/L (ref 3.5–5.5)
RBC # BLD AUTO: 4.48 MILLION/UL (ref 4.3–5.9)
SODIUM SERPL-SCNC: 135 MMOL/L (ref 134–143)
WBC # BLD AUTO: 9.1 THOUSAND/UL (ref 4.8–10.8)

## 2018-11-09 PROCEDURE — 92611 MOTION FLUOROSCOPY/SWALLOW: CPT

## 2018-11-09 PROCEDURE — 94640 AIRWAY INHALATION TREATMENT: CPT

## 2018-11-09 PROCEDURE — 94760 N-INVAS EAR/PLS OXIMETRY 1: CPT

## 2018-11-09 PROCEDURE — 93306 TTE W/DOPPLER COMPLETE: CPT

## 2018-11-09 PROCEDURE — 93306 TTE W/DOPPLER COMPLETE: CPT | Performed by: INTERNAL MEDICINE

## 2018-11-09 PROCEDURE — 92610 EVALUATE SWALLOWING FUNCTION: CPT

## 2018-11-09 PROCEDURE — 85027 COMPLETE CBC AUTOMATED: CPT | Performed by: NURSE PRACTITIONER

## 2018-11-09 PROCEDURE — 83880 ASSAY OF NATRIURETIC PEPTIDE: CPT | Performed by: NURSE PRACTITIONER

## 2018-11-09 PROCEDURE — G8996 SWALLOW CURRENT STATUS: HCPCS

## 2018-11-09 PROCEDURE — 80048 BASIC METABOLIC PNL TOTAL CA: CPT | Performed by: NURSE PRACTITIONER

## 2018-11-09 PROCEDURE — 99232 SBSQ HOSP IP/OBS MODERATE 35: CPT | Performed by: INTERNAL MEDICINE

## 2018-11-09 PROCEDURE — 99232 SBSQ HOSP IP/OBS MODERATE 35: CPT | Performed by: NURSE PRACTITIONER

## 2018-11-09 PROCEDURE — 97530 THERAPEUTIC ACTIVITIES: CPT

## 2018-11-09 PROCEDURE — G8998 SWALLOW D/C STATUS: HCPCS

## 2018-11-09 PROCEDURE — 74230 X-RAY XM SWLNG FUNCJ C+: CPT

## 2018-11-09 PROCEDURE — G8997 SWALLOW GOAL STATUS: HCPCS

## 2018-11-09 RX ORDER — FUROSEMIDE 40 MG/1
40 TABLET ORAL DAILY
Status: DISCONTINUED | OUTPATIENT
Start: 2018-11-09 | End: 2018-11-13 | Stop reason: HOSPADM

## 2018-11-09 RX ORDER — LEVALBUTEROL 1.25 MG/.5ML
1.25 SOLUTION, CONCENTRATE RESPIRATORY (INHALATION)
Status: DISCONTINUED | OUTPATIENT
Start: 2018-11-10 | End: 2018-11-12

## 2018-11-09 RX ORDER — SODIUM CHLORIDE FOR INHALATION 0.9 %
3 VIAL, NEBULIZER (ML) INHALATION
Status: DISCONTINUED | OUTPATIENT
Start: 2018-11-10 | End: 2018-11-12

## 2018-11-09 RX ADMIN — ISODIUM CHLORIDE 3 ML: 0.03 SOLUTION RESPIRATORY (INHALATION) at 21:20

## 2018-11-09 RX ADMIN — METOPROLOL TARTRATE 5 MG: 1 INJECTION, SOLUTION INTRAVENOUS at 15:30

## 2018-11-09 RX ADMIN — HEPARIN SODIUM 5000 UNITS: 5000 INJECTION INTRAVENOUS; SUBCUTANEOUS at 13:39

## 2018-11-09 RX ADMIN — FUROSEMIDE 40 MG: 40 TABLET ORAL at 13:39

## 2018-11-09 RX ADMIN — GUAIFENESIN 600 MG: 600 TABLET, EXTENDED RELEASE ORAL at 21:24

## 2018-11-09 RX ADMIN — CEFEPIME HYDROCHLORIDE 1000 MG: 1 INJECTION, SOLUTION INTRAVENOUS at 16:12

## 2018-11-09 RX ADMIN — BUDESONIDE AND FORMOTEROL FUMARATE DIHYDRATE 2 PUFF: 160; 4.5 AEROSOL RESPIRATORY (INHALATION) at 08:59

## 2018-11-09 RX ADMIN — BUDESONIDE AND FORMOTEROL FUMARATE DIHYDRATE 2 PUFF: 160; 4.5 AEROSOL RESPIRATORY (INHALATION) at 18:44

## 2018-11-09 RX ADMIN — METOPROLOL TARTRATE 5 MG: 1 INJECTION, SOLUTION INTRAVENOUS at 21:50

## 2018-11-09 RX ADMIN — LEVALBUTEROL HYDROCHLORIDE 1.25 MG: 1.25 SOLUTION, CONCENTRATE RESPIRATORY (INHALATION) at 21:19

## 2018-11-09 RX ADMIN — OXYBUTYNIN CHLORIDE 10 MG: 5 TABLET, EXTENDED RELEASE ORAL at 21:24

## 2018-11-09 RX ADMIN — HEPARIN SODIUM 5000 UNITS: 5000 INJECTION INTRAVENOUS; SUBCUTANEOUS at 21:23

## 2018-11-09 RX ADMIN — GUAIFENESIN 600 MG: 600 TABLET, EXTENDED RELEASE ORAL at 09:00

## 2018-11-09 RX ADMIN — HEPARIN SODIUM 5000 UNITS: 5000 INJECTION INTRAVENOUS; SUBCUTANEOUS at 05:22

## 2018-11-09 RX ADMIN — CEFEPIME HYDROCHLORIDE 1000 MG: 1 INJECTION, SOLUTION INTRAVENOUS at 05:23

## 2018-11-09 RX ADMIN — VANCOMYCIN HYDROCHLORIDE 750 MG: 1 INJECTION, POWDER, LYOPHILIZED, FOR SOLUTION INTRAVENOUS at 06:28

## 2018-11-09 NOTE — OCCUPATIONAL THERAPY NOTE
11/09/18 1250   Restrictions/Precautions   Weight Bearing Precautions Per Order No   Other Precautions O2;Fall Risk   Pain Assessment   Pain Assessment No/denies pain   ADL   Where Assessed Other (Comment)  (declined most self-care at this time / very dyspneic)   Grooming Comments did basic facial care tasks   UB Bathing Assistance 5  Supervision/Setup   UB Bathing Comments pt  wiped face and hands with washcloths provided   Coordination   Gross Motor [facility]   Dexterity WFL   Cognition   Overall Cognitive Status WFL   Arousal/Participation Responsive; Cooperative   Comments pt  stated "i'm 50-50" in regards to feeling of well-being   Activity Tolerance   Activity Tolerance Patient limited by fatigue;Treatment limited secondary to medical complications (Comment)   Medical Staff Made Aware nurse and CNA aware   Assessment   Assessment Patient participated in Skilled OT session this date with interventions consisting of Energy Conservation techniques and  therapeutic activities to: increase activity tolerance   Patient agreeable to OT treatment session, upon arrival patient was found sitting in bed (he was unaware that he did not have nasal cannula 'intact')  Readily dyspneic  Patient requiring frequent rest periods/unable to participate beyond Minimally  Patient continues to be functioning below baseline level, occupational performance remains limited secondary to factors listed above and increased risk for falls and injury  Patient to benefit from continued Occupational Therapy treatment while in the hospital to address deficits as defined above and maximize level of functional independence with ADLs and functional mobility  Plan   Treatment Interventions Energy conservation; Activityengagement   Goal Expiration Date 11/13/18   Treatment Day 172 FAHEEM Porras/L

## 2018-11-09 NOTE — CONSULTS
The Patients Vancomycin Therapy has been discontinued  Thank you for this consult  Pharmacy will sign off now

## 2018-11-09 NOTE — ASSESSMENT & PLAN NOTE
· Will treat as health-care associated pneumonia- patient is an assisted living resident  · Repeated chest x-ray from 11/08 shows persistent slight decreased peripheral left mid and left base infiltrates  New posterior right base infiltrate  With small pleural effusions  · Suspected aspiration component  Speech input appreciated  Pending barium swallowing  · Clinically is slightly improved today  · blood culture show no growth to date  · sputum culture is pending   · urine Legionella, strep pneumo are negative   · procalcitonin is trending down   · Continue with oxygen  · I discussed medication allergies with patient and his daughter and son  Patient does not recognize that he had any penicillin allergy  They do not believe that he is allergic to ciprofloxacin either  · Continue with cefepime  No allergic reactions to cefepime  · MRSA nare is negative and will discontinue vancomycin  · Encourage cough and deep breathing, early ambulation, acapella and IS  · Pulmonology input is appreciated

## 2018-11-09 NOTE — SPEECH THERAPY NOTE
Speech-Language Pathology Videofluoroscopic Swallow Study      Patient Name: Catalina Vieira    JSBFK'H Date: 11/9/2018     Problem List  Patient Active Problem List   Diagnosis    Acute on chronic respiratory failure with hypoxia ([facility] [address] )    Pneumonia due to infectious organism    COPD (chronic obstructive pulmonary disease) ([facility] [address] [facility]    Chronic indwelling Acevedo catheter    Acute on chronic systolic congestive heart failure ([facility] [address] )       Past Medical History  Past Medical History:   Diagnosis Date    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     COPD (chronic obstructive pulmonary disease) ([facility] [address] )     Hypercholesteremia     Hyperlipidemia     Malignant neoplasm prostate ([facility] [address] )     Phimosis     Stress incontinence     Urinary retention     Urinary urgency        Past Surgical History  Past Surgical History:   Procedure Laterality Date    BLADDER FULGURATION  2012, 2013    CIRCUMCISION, [place]  2013    PROSTATECTOMY  1995    TOTAL HIP ARTHROPLASTY Left 2012    URINARY SPHINCTER IMPLANT  1996    Repaired in 2002; Removed in 2012    UROFLOWMETRY SIMPLE / [address];  Pt is a 80 y o  male with a PMH remarkable for a current left/right sided pneumonia  Current concerns for dysphagia include chronic coughing with foods and liquids during assessment  VBS was recommended to assess oropharyngeal stage swallowing skills and determine risk for aspiration at this time  Pt was viewed in lateral position and was given trials of pureed, solid/dry food ( bread)) and  thin liquid via cup and straw  A mixed consistency item (fruit cup in liquid ) was administered  Oral stage; Pt presented with mild-moderate oral stage dysphagia with drier solids  Mastication was  Protracted but functional and grossly effective with materials administered today   Bolus formation and transfer were functional   Oral control appeared adequate with no gross premature spillage over the base of tongue  Pharyngeal stage; Pt presented with WFL pharyngeal dysphagia  Swallowing initiation was prompt  Hyolaryngeal rise and anterior displacement were adequate  AIrway closure/protection appeared complete  Tongue base retraction appeared to be of adequate strength  Pharyngeal constriction also appeared adequate  Management of food/liquid follows: All food and liquid passed through the pharynx with ease and safety with no penetration, aspiration or significant pharyngeal residue noted with materials administered today  Strategies and Efficacy: softer moister foods, smaller bites of food/slower rate of ingestion    Esophageal stage;  Esophageal screening was completed with thin liquid which appeared to pass through his esophagus with relative ease despite mild retropulsion  Assessment Summary; Pt presents with mild oropharyngeal dysphagia characterized by increased oral preparation time requiring three transfer to clear oral cavity with regular/drier solids  Recommendations: soft/level 3 diet and thin liquids   Recommended Form of Meds: whole with puree as tolerated  Aspiration/Reflux precautions and compensatory swallowing strategies: upright posture, small bites/sips and alternating bites and sips    Results Reviewed with: patient     Pt education completed at time of study  No additional treatment warranted at this time  Sukumar Jay, AYO/SLP  [id number]  [email or web address]  Available via tiger text

## 2018-11-09 NOTE — PLAN OF CARE
Problem: OCCUPATIONAL THERAPY ADULT  Goal: Performs self-care activities at highest level of function for planned discharge setting  See evaluation for individualized goals  Treatment Interventions: ADL retraining, Functional transfer training, Endurance training, Energy conservation  Equipment Recommended:  (RW)    See flowsheet documentation for full assessment, interventions and recommendations  Peterson Hernandez, MS, OTR/L     Outcome: Not Progressing  Limitation: Decreased ADL status, Decreased endurance, Decreased self-care trans, Decreased high-level ADLs  Prognosis: Good --- secondary to dyspnea, patient unable to have progressive participation level today  Assessment: Patient participated in Skilled OT session this date with interventions consisting of Energy Conservation techniques and  therapeutic activities to: increase activity tolerance   Patient agreeable to OT treatment session, upon arrival patient was found sitting in bed (he was unaware that he did not have nasal cannula 'intact')  Readily dyspneic  Patient requiring frequent rest periods/unable to participate beyond Minimally  Patient continues to be functioning below baseline level, occupational performance remains limited secondary to factors listed above and increased risk for falls and injury  Patient to benefit from continued Occupational Therapy treatment while in the hospital to address deficits as defined above and maximize level of functional independence with ADLs and functional mobility        OT Discharge Recommendation: Home OT  OT - OK to Discharge: Yes (when medically cleared)  FAHEEM Killian/JORGITO

## 2018-11-09 NOTE — DISCHARGE INSTR - DIET
Soft-regular foods as tolerated  Thin liquids by cup or straw  Medication best in puree followed by liquid as desired    Aspiration/reflux precuations

## 2018-11-09 NOTE — ASSESSMENT & PLAN NOTE
· Likely exacerbated by pneumonia and some component of congestive heart failure   · Will treat with antibiotics as noted above  · Patient does not appear to be in any COPD exacerbation at this time  · Continue on oxygen supplement

## 2018-11-09 NOTE — PLAN OF CARE
Problem: SLP ADULT - SWALLOWING, IMPAIRED  Goal: Initial SLP swallow eval performed  Outcome: Completed Date Met: 11/09/18

## 2018-11-09 NOTE — ASSESSMENT & PLAN NOTE
· The patient with history of moderate aortic stenosis with LVEF of 40-50% based on echo on 03/19/2018  · He received 1 dose of IV Lasix with much improvement of his respiratory status  · It is suspected that acute exacerbation is secondary to acute infection and inability to clear secretion  · Repeat echo result is pending  · Will add furosemide to his regimen  · Strict intakes and outputs  Daily weight

## 2018-11-09 NOTE — SOCIAL WORK
Discussed the POC with the interdisciplinary care team   Pt is active with [facility]  Pt is scheduled for a barium swallow  Spoke to Sunshine Lee at [facility]  Sunshine Lee states if the pt goes home on an antibiotic to fax the script to [phone]  Family will transport back to [facility]  [place] over the week-end if discharged

## 2018-11-09 NOTE — CONSULTS
Consultation - Pulmonary Medicine   Catina Rodriguez 80 y o  male MRN: [id number]  Unit/Bed#: -01 Encounter: [id number]      Physician Requesting Consult:   Kenneth Busby PA-C  Reason for Consult:   Pneumonia and COPD    Assessment/Plan:    1  Underlying COPD on quantitated  He smoked more than what has been reported in the records  COPD not clearly exacerbated  Issue seems to be difficulty clearing secretions and would make sure he gets up out of bed, at least, to sit in a chair  He is already on neb treatments and a speech and swallowing evaluation has already been requested  Will request physical therapy evaluation  2  History of valvular heart disease and cardiomyopathy  This was described in his March admission which was not included as part of this record  He just had a repeat echo done  He may have had some improvement with the dose of Lasix he received although the weights do not reflect that  ·   3  So far, no positive micro to guide therapy  White count was never elevated and temps have been trending downward  4  Saturations are adequate on nasal cannula   ______________________________________________________________________    HPI:    Catina Rodriguez is a 80 y o  male who presents to the emergency room on November 6 with complaints of shortness of breath and cough for 4 days  He also had low-grade fever  Initial vitals in the emergency room showed heart rate of 112 respiratory 24 temperature 99 4° blood pressure 141/63  He was noted to have wheezing and rales  He is treated there with DuoNeb  BNP was 162 white count 7 6  Chest x-ray questioned a left mid lung infiltrate  He was admitted and treated with azithromycin and Rocephin  The next day he spiked to 102 7 and antibiotics were switched to vancomycin, cefepime, and aztreonam     He was getting Symbicort and DuoNeb     His progress was worse than expected and additional workup was initiated with repeat chest x-ray  Cardiac echo was done today  He received a dose of Lasix yesterday  Patient has history of COPD, not quantitated  He was hospitalized here in March for 3 days with atrial fibrillation and congestive heart failure  He underwent a CT a chest describing extensive emphysema with small effusions and mild vascular congestion  He also had an echo in March showing ejection fraction 40-50% with hypokinesis of the posterior and lateral walls with moderate aortic stenosis  During that admission he presented with sats of 78% on room air but by the time he was discharged sats were okay on room air but needed oxygen with ambulation  He has been on oxygen 2 L continuously where he lives  Generally be functions adequately  He is on Symbicort and Combivent at home  He denies any difficulty swallowing  Denies increasing edema prior to admission  He describes decreased appetite  He is having some difficulty expectorating the phlegm  He has not gotten out of bed into the chair thus far  He denies chest pressure  He follows with Dr Mario López related to prostate cancer surgery remotely  PFT results:     Pulmonary Functions Testing Results:    No results found for: FEV1, FVC, JFB5CSX, TLC, DLCO      Review of Systems:Review of Systems   Constitutional: Positive for appetite change and fever  HENT: Positive for congestion  Negative for trouble swallowing  Respiratory: Positive for cough and shortness of breath  Negative for chest tightness  Cardiovascular: Negative for chest pain and leg swelling  Genitourinary: Positive for difficulty urinating         Historical Information   Past Medical History:   Diagnosis Date    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     [facility]chronic obstructive [facility] disease) [facility] [address] [facility]     Hypercholesteremia     Hyperlipidemia     Malignant neoplasm prostate (HCC)     Phimosis     Stress incontinence     Urinary retention     Urinary urgency      Past Surgical History:   Procedure Laterality Date    BLADDER FULGURATION  2012, 2013   [place]  2013    PROSTATECTOMY  1995    TOTAL HIP ARTHROPLASTY Left 2012    URINARY SPHINCTER IMPLANT  1996    Repaired in 2002; Removed in 2012    UROFLOWMETRY SIMPLE / COMPLEX  1996     Social History   History   Alcohol Use No     History   Smoking Status    Former Smoker    Packs/day: 0 50    Types: Cigarettes    Start date: 1947   Smokeless Tobacco    Never Used       Occupational history:  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  Family History:   Family History   Problem Relation Age of Onset    Family history unknown: Yes       Medications: The patient's active and prehospital medications were reviewed  Current Facility-Administered Medications:  acetaminophen 650 mg Oral Q6H PRN Gilmar Metz MD    albuterol 2 5 mg Nebulization Q4H PRN SEEMA Al    budesonide-formoterol 2 puff Inhalation BID Gilmar Metz MD    cefepime 1,000 mg Intravenous Q12H SEEMA Al Last Rate: 1,000 mg (11/09/18 0523)   guaiFENesin 600 mg Oral Q12H [facility] SEEMA Al    heparin (porcine) 5,000 Units Subcutaneous [facility] Gilmar Metz MD    levalbuterol 1 25 mg Nebulization 4x Daily SEEMA Al    Or        sodium chloride 3 mL Nebulization 4x Daily SEEMA Al    meclizine 25 mg Oral Q8H PRN Gilmar Metz MD    metoprolol 5 mg Intravenous Q6H PRN SEEMA Al    oxybutynin 10 mg Oral HS Gilmar Metz MD    vancomycin 10 mg/kg Intravenous Q12H SEEMA Al Last Rate: 750 mg (11/09/18 2152)         PhysicalExamination:Physical Exam   Constitutional: He appears cachectic  He is cooperative  He has a sickly appearance  Neck: No JVD present  Cardiovascular: Regular rhythm  Pulmonary/Chest: No accessory muscle usage or stridor  No respiratory distress  Upper airway noises with difficulty clearing secretions   Abdominal: Soft   He exhibits distension  This stent   Musculoskeletal:   Loss of muscle mass in the lower extremities, no edema   Neurological: He is alert  Psychiatric: His mood appears not anxious  He is not agitated  Excellent recall of events and communicates extremely well     Vitals:   Vitals:    11/08/18 2310 11/09/18 0305 11/09/18 0600 11/09/18 0631   BP: (!) 109/47 101/69  (!) 109/44   BP Location: Right arm Right arm  Right arm   Pulse: 63 (!) 52  97   Resp: 16 16  18   Temp: 99 1 °F (37 3 °C) 98 °F (36 7 °C)  98 5 °F (36 9 °C)   TempSrc: Tympanic Tympanic  Temporal   SpO2: 97% 91%  98%   Weight:   72 6 kg (160 lb)    Height:         Body mass index is 22 32 kg/m²  Temp  Min: 97 9 °F (36 6 °C)  Max: 102 7 °F (39 3 °C)  IBW: 75 3 kg    SpO2: 98 %,   SpO2 Activity: At Rest,   O2 Device: Nasal cannula      Diagnostic Data:  CBC:     Results from last 7 days  Lab Units 11/09/18  0524 11/08/18  0457 11/07/18  0507   WBC Thousand/uL 9 10 8 40 6 80   HEMOGLOBIN g/dL 11 5* 11 4* 11 8*   HEMATOCRIT % 37 2 36 3* 36 7   PLATELETS Thousands/uL 197 166 157       CMP:     Results from last 7 days  Lab Units 11/09/18  0524 11/08/18  0457 11/07/18  0507   POTASSIUM mmol/L 3 7 3 8 3 8   CHLORIDE mmol/L 99 101 99   CO2 mmol/L 30 28 29   BUN mg/dL 13 13 16   CREATININE mg/dL 0 82 0 86 0 85   CALCIUM mg/dL 8 3* 8 2* 8 4*   ALK PHOS U/L  --   --  49*   ALT U/L  --   --  15   AST U/L  --   --  27         Microbiology:    Results from last 7 days  Lab Units 11/08/18  2006 11/07/18  1453 11/07/18  1026 11/06/18  2242 11/06/18  2235 11/06/18  2234 11/06/18  1705 11/06/18  1704   BLOOD CULTURE   --   --   --   --   --   --  No Growth at 48 hrs  No Growth at 48 hrs  --    SPUTUM CULTURE  Culture too young- will reincubate  --  Test not performed  Suggest repeat specimen    --   --   --   --   --    GRAM STAIN RESULT  Rare Epithelial cells per low power field  1+ Polys  1+ Yeast  1+ Gram positive cocci in clusters  1+ Gram negative diplococci  --  >10 squamous epithelial cells/lpf, indicating orophayngeal contamination  4+ Polys  4+ Gram positive cocci in pairs  2+ Gram positive rods  2+ Gram positive cocci in clusters  1+ Budding Yeast with Pseudomycelia  1+ Gram positive cocci in chains  --   --   --   --   --    URINE CULTURE   --  10,000-19,000 cfu/ml   --   --   --   --   --   --    MRSA CULTURE ONLY   --   --   --  No Methicillin Resistant Staphlyococcus aureus (MRSA) isolated  --   --   --   --    INFLUENZA B PCR   --   --   --   --   --   --   --  None Detected   RSV PCR   --   --   --   --   --   --   --  None Detected   STREP PNEUMONIAE ANTIGEN, URINE   --   --   --   --   --  Negative  --   --    LEGIONELLA URINARY ANTIGEN   --   --   --   --  Negative  --   --   --        ABG: No results found for: PHART, YSX6NEJ, PO2ART, ZLP9EQM, W2XPGEJB, BEART, SOURCE    Imaging: Procedure: Xr Chest Pa & Lateral    Result Date: 11/8/2018  Narrative: INDICATION:  Shortness of breath  ORDERING PROVIDER:  Martin Chung  TECHNIQUE:  Frontal and lateral chest  COMPARISON:  11/6/2018 XR  FINDINGS:  The cardiomediastinal silhouette is upper normal in size  Emphysema and aortic isthmus calcification again noted  Persistent decreased peripheral left mid lung density with persistent decreased left base involvement  Posterior right base new airspace opacity and bilateral costophrenic angle blunting  There is no pneumothorax  No acute osseous process  Impression: Persistent slight decreased peripheral left mid and left base infiltrates  New posterior right base infiltrate  Small pleural effusions  Signed by Ronaldo Albert MD    Procedure: Xr Chest 2 Views    Result Date: 11/6/2018  Narrative: INDICATION:  Cough and chest congestion for three days  Shortness of breath with exertion  ORDERING PROVIDER:  BETTIE CORDOVA  TECHNIQUE:  Frontal and lateral (two images) chest  COMPARISON:  03/18/2018 XR  FINDINGS:  Heart is mildly enlarged  Atherosclerosis of aorta is present  Emphysematous changes are present with parenchymal scarring  Relative increase in density left midlung noted  Superimposed infection not excluded  There are no pleural effusions  There is no pneumothorax  No acute osseous process  Impression: COPD with parenchymal scarring  Superimposed left midlung pneumonia not excluded  Signed by Shaina Meneses MD    Lot of chronic appearing changes  The 2nd film perhaps showed an element of blunting of the costophrenic angles  There was a retrocardiac infiltrate noted on both studies      Cardiac lab/EKG/telemetry/ECHO:       Sinus rhythm with marked sinus arrhythmia with occasional Premature ventricular complexes  Left axis deviation  Right bundle branch block  Abnormal ECG  No previous ECGs available  Confirmed by [facility] Jo Ann CHAPMAN ([id number]) on 11/7/2018 10:19:42 AM    Echo from March of this year showed ejection fraction 40-50% with decreased function of the posterior lateral walls with moderate aortic stenosis      Anna James MD

## 2018-11-09 NOTE — PLAN OF CARE
Problem: SLP ADULT - SWALLOWING, IMPAIRED  Goal: Advance to least restrictive diet without signs or symptoms of aspiration for planned discharge setting  See evaluation for individualized goals    Patient will:     Outcome: Adequate for Discharge

## 2018-11-09 NOTE — SPEECH THERAPY NOTE
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     [facility]     Hypercholesteremia     Hyperlipidemia     [facility] prostate [facility]     Phimosis     Stress incontinence     Urinary retention     Urinary urgency        Past Surgical History  Past Surgical History:   Procedure Laterality Date    BLADDER FULGURATION  2012, 2013    CIRCUMCISION, [place]  2013    PROSTATECTOMY  1995    TOTAL HIP ARTHROPLASTY Left 2012    URINARY SPHINCTER IMPLANT  1996    Repaired in 2002; Removed in 2012    [address] / COMPLEX  1996       Summary   Pt presents with concern for possible aspiration given chronic moist productive cough for small amounts of thick yellowish phlegm during assessment  Appears to manage purees, soft and soft-solids  Concern for safest liquids and aspiration should be objectively r/o  Requested order for VBS  Risk for Aspiration: r/o secondary to poor respiratory status  Recommendations: regular diet and thin liquids   Recommended Form of Meds: whole with puree   Aspiration precautions and compensatory swallowing strategies: upright posture, slow rate of feeding and small bites/sips (pt educated re:same)    Current Medical Status per medicine  Pt is a 80 y o  male who presented to [address] [place] [address] with cough and shortness of breath  Patient notes his symptoms began approximately 3 days ago    He has been having a productive cough and shortness of breath, although denies any chest pain, nausea, vomiting and is otherwise well  Although the patient does have a history of COPD and is on chronic home oxygen, Patient has never had symptoms this severe before  In the emergency department patient was noted to be hypoxic on 2 L, and was subsequently placed on 6 L of oxygen  Upon my examination, patient notes that he feels better  Patient lives at an assisted living facility, is able to care for himself, and denies any recent travel or sick contacts        MD wishing to r/o aspiration and therefore swallowing consult ordered  Past medical history:  Please see H&P for details    Special Studies:  Xray chest 11/6/18: COPD with parenchymal scarring  Superimposed left midlung pneumonia not  excluded  Xray chest 11/8/18: Persistent slight decreased peripheral left mid and left base infiltrates  New posterior right base infiltrate  Small pleural effusions  Social/Education/Vocational Hx:  Pt lives in assisted living facility in [address]    Current Risks for Dysphagia & Aspiration: pneumonia, SOB     Current Symptoms/Concerns: change in respiratory status and frequent coughing    Current Diet: regular diet and thin liquids      Baseline Diet: regular diet and thin liquids      Baseline Assessment   Behavior/Cognition: alert and oriented    Speech/Language Status: alert and oriented    Patient Positioning: upright in bed    Pain Status/Interventions/Response to Interventions:   No report of or nonverbal indications of pain  Swallow Mechanism Exam   Oral-motor structures and function are WNL for symmetry, strength, ROM & coordination  Pt has upper and lower dentures that appear well worn  Consistencies Assessed and Performance   Consistencies Administered: nectar thick, puree, soft solids and hard solids  Specific materials administered included apple sauce, peaches, gram cracker, nectar juice and water via cup, straw and tsp      Oral Stage: minimal  Mastication was adequate with the materials administered today but somewhat prolonged  Bolus formation and transfer were functional with no significant oral residue noted  No overt s/s reduced oral control  Pt appears to swallow larger liquid swallow swallow in piecemeal fashion  Pharyngeal Stage: possible     Swallow Mechanics:  Swallowing initiation appeared prompt with single sips and mildly delayed with successive swallows  Laryngeal rise was palpated and judged to be within functional limits  Chronic coughing,  And upper airway congestion noted today throughout assessment  Pt able to expectorate small amounts of thick yellowish phlegm after using flutter valve  Esophageal Concerns: pt does report mild sticking of drier solids inmid esophagus at times  no reported vomiting of foods/liquids    Strategies and Efficacy:Advised small bites and sipping liquids in upright position  Summary and Recommendations (see above)    Results Reviewed with: patient     Consider referral for VBS:     Treatment Recommended: yes  Frequency of treatment: as needed to determine safety/diet tolerance    Dysphagia Goals per SLP: pt will participate in VBS    Pt/Family Education: initiated  Pt and caregivers would benefit from/require continued education  Speech Therapy Prognosis   Prognosis: fair    Prognosis Considerations: age and medically fragile status      Merry COON, [address] [place], CCC/SLP  [id number]  [email or web address]  Available via tiger text

## 2018-11-09 NOTE — PROGRESS NOTES
Progress Note - Susan Grace 11/15/1924, 80 y o  male MRN: [id number]    Unit/Bed#: -01 Encounter: [id number]    Primary Care Provider: Armond Jackson   Date and time admitted to hospital: 11/6/2018  4:35 PM        * Pneumonia due to infectious organism   Assessment & Plan    · Will treat as health-care associated pneumonia- patient is an assisted living resident  · Repeated chest x-ray from 11/08 shows persistent slight decreased peripheral left mid and left base infiltrates  New posterior right base infiltrate  With small pleural effusions  · Suspected aspiration component  · Clinically is slightly improved today  · blood culture show no growth to date  · sputum culture is pending   · urine Legionella, strep pneumo are negative   · procalcitonin is trending down   · Continue with oxygen  · I discussed medication allergies with patient and his daughter and son  Patient does not recognize that he had any penicillin allergy  They do not believe that he is allergic to ciprofloxacin either  · Continue with cefepime  No allergic reactions to cefepime  · MRSA nare is negative and will discontinue vancomycin  · Encourage cough and deep breathing, early ambulation, acapella and IS  · Pulmonology input is appreciated  Acute on chronic respiratory failure with hypoxia (HCC)   Assessment & Plan    · Likely exacerbated by pneumonia and some component of congestive heart failure   · Will treat with antibiotics as noted above  · Patient does not appear to be in any COPD exacerbation at this time  · Continue on oxygen supplement      Acute on chronic systolic congestive heart failure ([facility] [address] )   Assessment & Plan    · The patient with history of moderate aortic stenosis with LVEF of 40-50% based on echo on 03/19/2018  · He received 1 dose of IV Lasix with much improvement of his respiratory status    · It is suspected that acute exacerbation is secondary to acute infection and inability to clear secretion  · Repeat echo result is pending  · Will add furosemide to his regimen  · Strict intakes and outputs  Daily weight  Chronic indwelling Acevedo catheter   Assessment & Plan    · Urinalysis shows suspected urine tract infection  · Urine culture shows no growth  COPD (chronic obstructive pulmonary disease) (HCC)   Assessment & Plan    · Patient has history of COPD on chronic home oxygen of 2 L  · Does not appear to be in any COPD exacerbation at this time  · Continue home inhalers along with Duoneb and nebulizers p r n  VTE Pharmacologic Prophylaxis: Pharmacologic: Enoxaparin (Lovenox)    Patient Centered Rounds: I have performed bedside rounds with nursing staff today  Discussions with Specialists or Other Care Team Provider: nursing, pulmonology  Education and Discussions with Family / Patient:   Patient and children    Time Spent for Care: 20 minutes  More than 50% of total time spent on counseling and coordination of care as described above  Current Length of Stay: 3 day(s)    Current Patient Status: Inpatient   Certification Statement: The patient will continue to require additional inpatient hospital stay due to Pneumonia    Discharge Plan: pending hospital course     Code Status: Level 3 - DNAR and DNI    Subjective:   Feeling somewhat better today  Continue to have difficulty clearing his throat  Denies any chest pain  Some SOB  Objective:     Vitals:   Temp (24hrs), Av 9 °F (37 2 °C), Min:98 °F (36 7 °C), Max:99 6 °F (37 6 °C)    Temp:  [98 °F (36 7 °C)-99 6 °F (37 6 °C)] 98 5 °F (36 9 °C)  HR:  [52-97] 58  Resp:  [16-18] 18  BP: (101-133)/(44-79) 133/72  SpO2:  [91 %-98 %] 94 %  Body mass index is 22 32 kg/m²  Input and Output Summary (last 24 hours):        Intake/Output Summary (Last 24 hours) at 18 1304  Last data filed at 18 0753   Gross per 24 hour   Intake              440 ml   Output             2725 ml   Net            -2285 ml       Physical Exam:     Physical Exam   Constitutional: He is oriented to person, place, and time  He appears well-developed and well-nourished  No distress  Frail elderly male     HENT:   Head: Normocephalic and atraumatic  Mouth/Throat: Oropharynx is clear and moist    Eyes: Pupils are equal, round, and reactive to light  Conjunctivae and EOM are normal    Neck: Normal range of motion  Neck supple  No thyromegaly present  Cardiovascular: Intact distal pulses  No murmur heard  Irregular rhythm  Heart sounds distant  Pulmonary/Chest: Effort normal  No respiratory distress  He has no wheezes  He has rales (With rhonchi throughout the lung fields this is noted to be improved compared to yesterday)  Abdominal: Soft  Bowel sounds are normal  He exhibits no distension  There is no tenderness  Genitourinary:   Genitourinary Comments: Suprapubic catheter with clear yellow urine  Musculoskeletal: Normal range of motion  He exhibits no edema or deformity  Neurological: He is alert and oriented to person, place, and time  He has normal reflexes  Skin: Skin is warm and dry  No erythema  Psychiatric: He has a normal mood and affect  His behavior is normal  Thought content normal    Vitals reviewed  Additional Data:     Labs:      Results from last 7 days  Lab Units 11/09/18  0524  11/06/18  1643   WBC Thousand/uL 9 10  < > 7 60   HEMOGLOBIN g/dL 11 5*  < > 12 4*   HEMATOCRIT % 37 2  < > 38 7   PLATELETS Thousands/uL 197  < > 157   NEUTROS PCT %  --   --  53   LYMPHS PCT %  --   --  36   MONOS PCT %  --   --  10   EOS PCT %  --   --  0   < > = values in this interval not displayed      Results from last 7 days  Lab Units 11/09/18  0524  11/07/18  0507   POTASSIUM mmol/L 3 7  < > 3 8   CHLORIDE mmol/L 99  < > 99   CO2 mmol/L 30  < > 29   BUN mg/dL 13  < > 16   CREATININE mg/dL 0 82  < > 0 85   CALCIUM mg/dL 8 3*  < > 8 4*   ALK PHOS U/L  --   --  49*   ALT U/L  --   --  15   AST U/L  --   --  27   < > = values in this interval not displayed  Results from last 7 days  Lab Units 11/06/18  1643   INR  1 09               * I Have Reviewed All Lab Data Listed Above  * Additional Pertinent Lab Tests Reviewed: [address] Admission  Reviewed    Imaging:  Imaging Reports Reviewed Today Include: cxr    Recent Cultures (last 7 days):       Results from last 7 days  Lab Units 11/08/18 2006 11/07/18  1453 11/07/18  1026 11/06/18  2235 11/06/18  1705 11/06/18  1704   BLOOD CULTURE   --   --   --   --  No Growth at 48 hrs  No Growth at 48 hrs  --    SPUTUM CULTURE  Culture too young- will reincubate  --  Test not performed  Suggest repeat specimen  --   --   --    GRAM STAIN RESULT  Rare Epithelial cells per low power field  1+ Polys  1+ Yeast  1+ Gram positive cocci in clusters  1+ Gram negative diplococci  --  >10 squamous epithelial cells/lpf, indicating orophayngeal contamination    4+ Polys  4+ Gram positive cocci in pairs  2+ Gram positive rods  2+ Gram positive cocci in clusters  1+ Budding Yeast with Pseudomycelia  1+ Gram positive cocci in chains  --   --   --    URINE CULTURE   --  10,000-19,000 cfu/ml   --   --   --   --    INFLUENZA B PCR   --   --   --   --   --  None Detected   RSV PCR   --   --   --   --   --  None Detected   LEGIONELLA URINARY ANTIGEN   --   --   --  Negative  --   --        Last 24 Hours Medication List:     Current Facility-Administered Medications:  acetaminophen 650 mg Oral Q6H PRN Brendan Daily MD    albuterol 2 5 mg Nebulization Q4H PRN SEEMA Horn    budesonide-formoterol 2 puff Inhalation BID Brendan Daily MD    cefepime 1,000 mg Intravenous Q12H SEEMA Horn Last Rate: 1,000 mg (11/09/18 0523)   guaiFENesin 600 mg Oral Q12H [address] SEEMA Horn    heparin (porcine) 5,000 Units Subcutaneous [facility] Brendan Daily MD    levalbuterol 1 25 mg Nebulization 4x Daily SEEMA Horn    Or        sodium chloride 3 mL Nebulization 4x Daily SEEMA Rizvi    meclizine 25 mg Oral Q8H PRN Sae Ramsey MD    metoprolol 5 mg Intravenous Q6H PRN SEEMA Rizvi    oxybutynin 10 mg Oral HS Sae Ramsey MD         Today, Patient Was Seen By: SEEMA Rizvi    ** Please Note: Dictation voice to text software may have been used in the creation of this document   **

## 2018-11-10 LAB
ANION GAP SERPL CALCULATED.3IONS-SCNC: 6 MMOL/L (ref 4–13)
BACTERIA SPT RESP CULT: ABNORMAL
BACTERIA SPT RESP CULT: ABNORMAL
BUN SERPL-MCNC: 13 MG/DL (ref 7–25)
CALCIUM SERPL-MCNC: 8.4 MG/DL (ref 8.6–10.5)
CHLORIDE SERPL-SCNC: 97 MMOL/L (ref 98–107)
CO2 SERPL-SCNC: 33 MMOL/L (ref 21–31)
CREAT SERPL-MCNC: 0.83 MG/DL (ref 0.7–1.3)
ERYTHROCYTE [DISTWIDTH] IN BLOOD BY AUTOMATED COUNT: 15.1 % (ref 11.5–14.5)
GFR SERPL CREATININE-BSD FRML MDRD: 76 ML/MIN/1.73SQ M
GLUCOSE SERPL-MCNC: 111 MG/DL (ref 65–99)
GRAM STN SPEC: ABNORMAL
HCT VFR BLD AUTO: 37 % (ref 36.5–49.3)
HGB BLD-MCNC: 11.6 G/DL (ref 14–18)
MCH RBC QN AUTO: 25.8 PG (ref 26–34)
MCHC RBC AUTO-ENTMCNC: 31.3 G/DL (ref 31–37)
MCV RBC AUTO: 83 FL (ref 81–99)
PLATELET # BLD AUTO: 236 THOUSANDS/UL (ref 149–390)
PMV BLD AUTO: 9.6 FL (ref 8.6–11.7)
POTASSIUM SERPL-SCNC: 3.5 MMOL/L (ref 3.5–5.5)
RBC # BLD AUTO: 4.49 MILLION/UL (ref 4.3–5.9)
SODIUM SERPL-SCNC: 136 MMOL/L (ref 134–143)
WBC # BLD AUTO: 9.6 THOUSAND/UL (ref 4.8–10.8)

## 2018-11-10 PROCEDURE — 94668 MNPJ CHEST WALL SBSQ: CPT

## 2018-11-10 PROCEDURE — 99232 SBSQ HOSP IP/OBS MODERATE 35: CPT | Performed by: NURSE PRACTITIONER

## 2018-11-10 PROCEDURE — 85027 COMPLETE CBC AUTOMATED: CPT | Performed by: NURSE PRACTITIONER

## 2018-11-10 PROCEDURE — 97116 GAIT TRAINING THERAPY: CPT

## 2018-11-10 PROCEDURE — 80048 BASIC METABOLIC PNL TOTAL CA: CPT | Performed by: NURSE PRACTITIONER

## 2018-11-10 PROCEDURE — 97535 SELF CARE MNGMENT TRAINING: CPT

## 2018-11-10 PROCEDURE — 94760 N-INVAS EAR/PLS OXIMETRY 1: CPT

## 2018-11-10 PROCEDURE — 94640 AIRWAY INHALATION TREATMENT: CPT

## 2018-11-10 RX ORDER — POTASSIUM CHLORIDE 20 MEQ/1
40 TABLET, EXTENDED RELEASE ORAL ONCE
Status: COMPLETED | OUTPATIENT
Start: 2018-11-10 | End: 2018-11-10

## 2018-11-10 RX ADMIN — METOPROLOL TARTRATE 25 MG: 25 TABLET ORAL at 21:11

## 2018-11-10 RX ADMIN — CEFEPIME HYDROCHLORIDE 1000 MG: 1 INJECTION, SOLUTION INTRAVENOUS at 05:06

## 2018-11-10 RX ADMIN — METOPROLOL TARTRATE 12.5 MG: 25 TABLET ORAL at 12:12

## 2018-11-10 RX ADMIN — BUDESONIDE AND FORMOTEROL FUMARATE DIHYDRATE 2 PUFF: 160; 4.5 AEROSOL RESPIRATORY (INHALATION) at 09:03

## 2018-11-10 RX ADMIN — METOPROLOL TARTRATE 5 MG: 1 INJECTION, SOLUTION INTRAVENOUS at 04:56

## 2018-11-10 RX ADMIN — GUAIFENESIN 600 MG: 600 TABLET, EXTENDED RELEASE ORAL at 21:11

## 2018-11-10 RX ADMIN — HEPARIN SODIUM 5000 UNITS: 5000 INJECTION INTRAVENOUS; SUBCUTANEOUS at 21:12

## 2018-11-10 RX ADMIN — FUROSEMIDE 40 MG: 40 TABLET ORAL at 09:03

## 2018-11-10 RX ADMIN — METOPROLOL TARTRATE 5 MG: 1 INJECTION, SOLUTION INTRAVENOUS at 15:37

## 2018-11-10 RX ADMIN — POTASSIUM CHLORIDE 40 MEQ: 1500 TABLET, EXTENDED RELEASE ORAL at 09:03

## 2018-11-10 RX ADMIN — HEPARIN SODIUM 5000 UNITS: 5000 INJECTION INTRAVENOUS; SUBCUTANEOUS at 06:05

## 2018-11-10 RX ADMIN — OXYBUTYNIN CHLORIDE 10 MG: 5 TABLET, EXTENDED RELEASE ORAL at 21:12

## 2018-11-10 RX ADMIN — BUDESONIDE AND FORMOTEROL FUMARATE DIHYDRATE 2 PUFF: 160; 4.5 AEROSOL RESPIRATORY (INHALATION) at 18:54

## 2018-11-10 RX ADMIN — ACETAMINOPHEN 650 MG: 325 TABLET ORAL at 04:56

## 2018-11-10 RX ADMIN — GUAIFENESIN 600 MG: 600 TABLET, EXTENDED RELEASE ORAL at 09:03

## 2018-11-10 RX ADMIN — LEVALBUTEROL HYDROCHLORIDE 1.25 MG: 1.25 SOLUTION, CONCENTRATE RESPIRATORY (INHALATION) at 07:06

## 2018-11-10 RX ADMIN — LEVALBUTEROL HYDROCHLORIDE 1.25 MG: 1.25 SOLUTION, CONCENTRATE RESPIRATORY (INHALATION) at 11:17

## 2018-11-10 RX ADMIN — HEPARIN SODIUM 5000 UNITS: 5000 INJECTION INTRAVENOUS; SUBCUTANEOUS at 14:44

## 2018-11-10 RX ADMIN — CEFEPIME HYDROCHLORIDE 1000 MG: 1 INJECTION, SOLUTION INTRAVENOUS at 19:00

## 2018-11-10 NOTE — ASSESSMENT & PLAN NOTE
· Urinalysis shows suspected urine tract infection  · Urine culture shows no growth  · Acute cystitis is ruled out

## 2018-11-10 NOTE — ASSESSMENT & PLAN NOTE
· The patient with history of moderate aortic stenosis with LVEF of 40-50% based on echo on 03/19/2018  · He received 1 dose of IV Lasix with much improvement of his respiratory status  · It is suspected that acute exacerbation is secondary to acute infection and inability to clear secretion  · Echocardiogram on 11/09/2018 shows LVEF of 45%  This study was inadequate for the evaluation of regional wall motion  · Not much change from previous ECHO done on 3/19  · Will add furosemide to his regimen  · Strict intakes and outputs  Daily weight

## 2018-11-10 NOTE — ASSESSMENT & PLAN NOTE
· Will treat as health-care associated pneumonia- patient is an assisted living resident  · Repeated chest x-ray from 11/08 shows persistent slight decreased peripheral left mid and left base infiltrates  New posterior right base infiltrate  With small pleural effusions  · Suspected aspiration component  Speech input appreciated  Pending barium swallowing  · Concern for possible aspiration given chronic moist productive cough  · Clinically is improved today  · blood culture show no growth to date  · sputum culture shows Candida with mixed respiratory biju  · urine Legionella, strep pneumo are negative   · procalcitonin is trending down   · Continue with oxygen  · I discussed medication allergies with patient and his daughter and son  Patient does not recognize that he had any penicillin allergy  They do not believe that he is allergic to ciprofloxacin either  · Continue with cefepime  No allergic reactions to cefepime  · MRSA nare is negative and vancomycin was discontinued  · Encourage cough and deep breathing, early ambulation, acapella and IS  · Pulmonology input is appreciated

## 2018-11-10 NOTE — PHYSICAL THERAPY NOTE
Physical Therapy Treatment Encounter Note    Patient's Name: Mayur Avila    Admitting Diagnosis  Atrial fibrillation ([place] [address] ) [I48 91]  Shortness of breath [R06 02]  COPD (chronic obstructive pulmonary disease) ([place] [address] ) [J44 9]  Pneumonia [J18 9]  SOB (shortness of breath) [R06 02]  Hypoxia [R09 02]    Problem List  Patient Active Problem List   Diagnosis    Acute on chronic respiratory failure with hypoxia ([place] [address] )    Pneumonia due to infectious organism    COPD (chronic obstructive pulmonary disease) ([place] [address] )    Chronic indwelling Acevedo catheter    Acute on chronic systolic congestive heart failure ([place] [address] )       Past Medical History  Past Medical History:   Diagnosis Date    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     COPD (chronic obstructive pulmonary disease) ([place] [address] )     Hypercholesteremia     Hyperlipidemia     Malignant neoplasm prostate ([place] [address] )     Phimosis     Stress incontinence     Urinary retention     Urinary urgency        Past Surgical History  Past Surgical History:   Procedure Laterality Date    BLADDER FULGURATION  2012, 2013    [place], [place]  2013   [address]    TOTAL HIP ARTHROPLASTY Left 2012    URINARY SPHINCTER IMPLANT  1996    Repaired in 2002; Removed in 2012    UROFLOWMETRY SIMPLE / COMPLEX  1996        11/10/18 1101   Pain Assessment   Pain Assessment No/denies pain   Pain Score No Pain   Restrictions/Precautions   Weight Bearing Precautions Per Order No   Other Precautions O2;Fall Risk;Hard of hearing   General   Chart Reviewed Yes   Response to Previous Treatment Patient with no complaints from previous session  Family/Caregiver Present No   Cognition   Overall Cognitive Status WFL   Arousal/Participation Responsive; Cooperative   Attention Attends with cues to redirect   Orientation Level Oriented X4   Memory Within functional limits   Following Commands Follows one step commands without difficulty   Subjective   Subjective "I have just been coughing alot"   Bed Mobility   Rolling L 6  Modified independent   Additional items Bedrails;Verbal cues   Supine to Sit 4  Minimal assistance   Additional items Assist x 1;HOB elevated;Verbal cues;LE management   Transfers   Sit to Stand 4  Minimal assistance   Additional items Assist x 1;Bedrails;Verbal cues   Stand to Sit 4  Minimal assistance   Additional items Assist x 1; Armrests; Verbal cues   Ambulation/Elevation   Gait pattern Forward Flexion;Decreased foot clearance; Improper Weight shift; Short stride   Gait Assistance 4  Minimal assist   Additional items Assist x 1;Verbal cues; Tactile cues  (occasional VC's for RW usage, TC's 75% to stabilize)   Assistive Device Rolling walker   Distance 45 feet   Stair Management Assistance Not tested   Balance   Static Sitting Good   Dynamic Sitting Good   Static Standing Fair +   Dynamic Standing Fair +   Ambulatory Fair   Endurance Deficit   Endurance Deficit Yes   Endurance Deficit Description increase SOB and notable congested cough w/ exertional tasks, remained on O2 throughout session   Activity Tolerance   Activity Tolerance Patient limited by fatigue   Medical Staff Made Aware yes Shilpa Diaz   Assessment   Prognosis Good   Problem List Decreased strength;Decreased endurance; Impaired balance;Decreased mobility; Decreased coordination; Impaired judgement;Decreased safety awareness; Impaired hearing   Assessment Pt seen for PT treatment session this date with interventions consisting of gait training w/ emphasis on improving pt's ability to ambulate level surfaces x 45 with min A provided by therapist with RW  Pt agreeable to PT treatment session upon arrival, pt found supine in bed, in no apparent distress, A&O x 4 and responsive  In comparison to previous session, pt with improvements in distance of AMB, safety awareness   Post session: chair alarm engaged and all needs in reach Continue to recommend return to [facility] at time of d/c in order to maximize pt's functional independence and safety w/ mobility  Pt continues to be functioning below baseline level, and remains limited 2* factors listed above and including decreased endurance and activity intolerance  PT will continue to see pt while here in order to address the deficits listed above and provide interventions consistent w/ POC in effort to achieve STGs  Goals   Patient Goals cough less   STG Expiration Date 11/13/18   Short Term Goal #1 STG's remain appropriate   LTG Expiration Date 11/18/18   Long Term Goal #1 LTG's remain appropriate   Treatment Day 2   Plan   Treatment/Interventions Functional transfer training;LE strengthening/ROM; Therapeutic exercise; Endurance training;Patient/family training;Equipment eval/education; Bed mobility;Gait training;Spoke to advanced practitioner;OT   Progress Slow progress, decreased activity tolerance   PT Frequency 5x/wk   Recommendation   Recommendation Home PT; Other (Comment)  (return to skilled nursing)   Equipment Recommended Walker   PT - OK to Discharge Yes  (when medically stable)   Additional Comments Upon conclusion, patient was resting in chair with all needs within reach on tray     Treatment Time: 0932-8060    Yasmany Vora, PT

## 2018-11-10 NOTE — PROGRESS NOTES
Progress Note - Ernesto Fearing 11/15/1924, 80 y o  male MRN: [id number]    Unit/Bed#: -01 Encounter: [id number]    Primary Care Provider: Catalina Seo   Date and time admitted to hospital: 11/6/2018  4:35 PM        * Pneumonia due to infectious organism   Assessment & Plan    · Will treat as health-care associated pneumonia- patient is an assisted living resident  · Repeated chest x-ray from 11/08 shows persistent slight decreased peripheral left mid and left base infiltrates  New posterior right base infiltrate  With small pleural effusions  · Suspected aspiration component  Speech input appreciated  Pending barium swallowing  · Concern for possible aspiration given chronic moist productive cough  · Clinically is improved today  · blood culture show no growth to date  · sputum culture shows Candida with mixed respiratory biju  · urine Legionella, strep pneumo are negative   · procalcitonin is trending down   · Continue with oxygen  · I discussed medication allergies with patient and his daughter and son  Patient does not recognize that he had any penicillin allergy  They do not believe that he is allergic to ciprofloxacin either  · Continue with cefepime  No allergic reactions to cefepime  · MRSA nare is negative and vancomycin was discontinued  · Encourage cough and deep breathing, early ambulation, acapella and IS  · Pulmonology input is appreciated  Acute on chronic systolic congestive heart failure (HCC)   Assessment & Plan    · The patient with history of moderate aortic stenosis with LVEF of 40-50% based on echo on 03/19/2018  · He received 1 dose of IV Lasix with much improvement of his respiratory status  · It is suspected that acute exacerbation is secondary to acute infection and inability to clear secretion  · Echocardiogram on 11/09/2018 shows LVEF of 45%    This study was inadequate for the evaluation of regional wall motion  · Not much change from previous ECHO done on 3/19  · Will add furosemide to his regimen  · Strict intakes and outputs  Daily weight  Acute on chronic respiratory failure with hypoxia ([facility])   Assessment & Plan    · Likely exacerbated by pneumonia and some component of congestive heart failure   · Will treat with antibiotics as noted above  · Patient does not appear to be in any COPD exacerbation at this time  · Continue on oxygen supplement      Chronic indwelling Acevedo catheter   Assessment & Plan    · Urinalysis shows suspected urine tract infection  · Urine culture shows no growth  · Acute cystitis is ruled out  COPD (chronic obstructive pulmonary disease) ([facility])   Assessment & Plan    · Patient has history of COPD on chronic home oxygen of 2 L  · Does not appear to be in any COPD exacerbation at this time  · Continue home inhalers along with Duoneb and nebulizers p r n  VTE Pharmacologic Prophylaxis: Pharmacologic: Enoxaparin (Lovenox)    Patient Centered Rounds: I have performed bedside rounds with nursing staff today  Discussions with Specialists or Other Care Team Provider: nursing pulmonology   Education and Discussions with Family / Patient: Letta Curling     Time Spent for Care: 20 minutes  More than 50% of total time spent on counseling and coordination of care as described above  Current Length of Stay: 4 day(s)    Current Patient Status: Inpatient   Certification Statement: The patient will continue to require additional inpatient hospital stay due to pneumonia     Discharge Plan: patient is improving  Hoping that we can discharge tomorrow  Code Status: Level 3 - DNAR and DNI    Subjective:   Feeling okay  Continue to have coughing with some productive cough  No pain       Objective:     Vitals:   Temp (24hrs), Av 2 °F (37 3 °C), Min:98 1 °F (36 7 °C), Max:101 1 °F (38 4 °C)    Temp:  [98 1 °F (36 7 °C)-101 1 °F (38 4 °C)] 98 1 °F (36 7 °C)  HR:  [] 96  Resp:  [18-34] 20  BP: (116-133)/(56-72) 128/64  SpO2: [90 %-98 %] 90 %  Body mass index is 22 35 kg/m²  Input and Output Summary (last 24 hours): Intake/Output Summary (Last 24 hours) at 11/10/18 0412  Last data filed at 11/09/18 2001   Gross per 24 hour   Intake              300 ml   Output                0 ml   Net              300 ml       Physical Exam:     Physical Exam   Constitutional: He is oriented to person, place, and time  He appears well-developed and well-nourished  No distress  HENT:   Head: Normocephalic and atraumatic  Mouth/Throat: Oropharynx is clear and moist    Eyes: Pupils are equal, round, and reactive to light  Conjunctivae and EOM are normal    Neck: Normal range of motion  Neck supple  No thyromegaly present  Cardiovascular: Normal heart sounds and intact distal pulses  Irregular rhythm  Tachycardia  Pulmonary/Chest: Effort normal  No respiratory distress  He has no wheezes  Coarse and rhonchi more so on upper lobes  Abdominal: Soft  Bowel sounds are normal  He exhibits no distension  There is no tenderness  Musculoskeletal: Normal range of motion  He exhibits no edema or deformity  Neurological: He is alert and oriented to person, place, and time  He has normal reflexes  Skin: Skin is warm and dry  No erythema  Psychiatric: He has a normal mood and affect  His behavior is normal  Thought content normal    Vitals reviewed  Additional Data:     Labs:      Results from last 7 days  Lab Units 11/10/18  0529 11/06/18  1643   WBC Thousand/uL 9 60  < > 7 60   HEMOGLOBIN g/dL 11 6*  < > 12 4*   HEMATOCRIT % 37 0  < > 38 7   PLATELETS Thousands/uL 236  < > 157   NEUTROS PCT %  --   --  53   LYMPHS PCT %  --   --  36   MONOS PCT %  --   --  10   EOS PCT %  --   --  0   < > = values in this interval not displayed      Results from last 7 days  Lab Units 11/10/18  0529  11/07/18  0507   POTASSIUM mmol/L 3 5  < > 3 8   CHLORIDE mmol/L 97*  < > 99   CO2 mmol/L 33*  < > 29   BUN mg/dL 13  < > 16   CREATININE mg/dL 0 83  < > 0 85   CALCIUM mg/dL 8 4*  < > 8 4*   ALK PHOS U/L  --   --  49*   ALT U/L  --   --  15   AST U/L  --   --  27   < > = values in this interval not displayed  Results from last 7 days  Lab Units 11/06/18  1643   INR  1 09               * I Have Reviewed All Lab Data Listed Above  * Additional Pertinent Lab Tests Reviewed: [address] Admission  Reviewed    Imaging:  Imaging Reports Reviewed Today Include: cxr    Recent Cultures (last 7 days):       Results from last 7 days  Lab Units 11/08/18  2006 11/07/18  1453 11/07/18  1026 11/06/18  2235 11/06/18  1705 11/06/18  1704   BLOOD CULTURE   --   --   --   --  No Growth at 72 hrs  No Growth at 72 hrs   --    SPUTUM CULTURE  3+ Growth of Candida sp  presumptively albicans*  2+ Growth of   --  Test not performed  Suggest repeat specimen  --   --   --    GRAM STAIN RESULT  Rare Epithelial cells per low power field  1+ Polys  1+ Yeast  1+ Gram positive cocci in clusters  1+ Gram negative diplococci  --  >10 squamous epithelial cells/lpf, indicating orophayngeal contamination    4+ Polys  4+ Gram positive cocci in pairs  2+ Gram positive rods  2+ Gram positive cocci in clusters  1+ Budding Yeast with Pseudomycelia  1+ Gram positive cocci in chains  --   --   --    URINE CULTURE   --  10,000-19,000 cfu/ml   --   --   --   --    INFLUENZA B PCR   --   --   --   --   --  None Detected   RSV PCR   --   --   --   --   --  None Detected   LEGIONELLA URINARY ANTIGEN   --   --   --  Negative  --   --        Last 24 Hours Medication List:     Current Facility-Administered Medications:  acetaminophen 650 mg Oral Q6H PRN Antonia Diaz MD    albuterol 2 5 mg Nebulization Q4H PRN SEEMA Melgar    budesonide-formoterol 2 puff Inhalation BID Antonia Diaz MD    cefepime 1,000 mg Intravenous Q12H SEEMA Melgar Last Rate: 1,000 mg (11/09/18 0523)   furosemide 40 mg Oral Daily SEEMA Melgar    guaiFENesin 600 mg Oral Q12H [facility] HOME SEEMA Gilman    heparin (porcine) 5,000 Units Subcutaneous [facility] Fredy Quiroga MD    levalbuterol 1 25 mg Nebulization 4x Daily Fredy Quiroga MD    Or        sodium chloride 3 mL Nebulization 4x Daily Fredy Quiroga MD    meclizine 25 mg Oral Q8H PRN Fredy Quiroga MD    metoprolol 5 mg Intravenous Q6H PRN SEEMA Gilman    oxybutynin 10 mg Oral HS Fredy Quiroga MD         Today, Patient Was Seen By: SEEMA Gilman    ** Please Note: Dictation voice to text software may have been used in the creation of this document   **

## 2018-11-10 NOTE — OCCUPATIONAL THERAPY NOTE
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     COPD (chronic obstructive pulmonary disease) ([facility] )     Hypercholesteremia     Hyperlipidemia     Malignant neoplasm prostate ([facility] )     Phimosis     Stress incontinence     Urinary retention     Urinary urgency        Past Surgical History:   Procedure Laterality Date    BLADDER FULGURATION  2012, 2013    CIRCUMCISION, [place]  2013   [address]    TOTAL HIP ARTHROPLASTY Left 2012    URINARY SPHINCTER IMPLANT  1996    Repaired in 2002; Removed in 2012    UROFLOWMETRY SIMPLE / COMPLEX  1996        11/10/18 1145   Restrictions/Precautions   Other Precautions Fall Risk;O2;Hard of hearing   Pain Assessment   Pain Assessment No/denies pain   Pain Score No Pain   ADL   Eating Assistance 7  Independent   Eating Deficit Setup  (I with dentures)   Eating Comments Left dominant, slight shakiness   Grooming Assistance 5  Supervision/Setup   Grooming Deficit (reviewed basic energy conservation concepts with pt)   Cognition   Overall Cognitive Status WFL   Arousal/Participation Alert; Cooperative   Attention Attends with cues to redirect   Orientation Level Oriented X4   Memory Within functional limits   Following Commands Follows one step commands without difficulty   Assessment   Assessment Agreeable to OT, alert and participated in session  (Pt completed light grooming, dentures and self feeding withC)   Plan   Treatment Interventions Energy conservation; Activityengagement;ADL retraining;Patient/family training   Goal Expiration Date 11/13/18   Treatment Day 2   OT Frequency 3-5x/wk   Recommendation   OT Discharge Recommendation Home OT   OT - OK to Discharge Yes   Aisha Gibson, OT

## 2018-11-11 LAB
ANION GAP SERPL CALCULATED.3IONS-SCNC: 5 MMOL/L (ref 4–13)
BACTERIA BLD CULT: NORMAL
BACTERIA BLD CULT: NORMAL
BUN SERPL-MCNC: 15 MG/DL (ref 7–25)
CALCIUM SERPL-MCNC: 8.5 MG/DL (ref 8.6–10.5)
CHLORIDE SERPL-SCNC: 96 MMOL/L (ref 98–107)
CO2 SERPL-SCNC: 37 MMOL/L (ref 21–31)
CREAT SERPL-MCNC: 0.85 MG/DL (ref 0.7–1.3)
GFR SERPL CREATININE-BSD FRML MDRD: 75 ML/MIN/1.73SQ M
GLUCOSE SERPL-MCNC: 92 MG/DL (ref 65–99)
POTASSIUM SERPL-SCNC: 3.8 MMOL/L (ref 3.5–5.5)
PROCALCITONIN SERPL-MCNC: 0.18 NG/ML
SODIUM SERPL-SCNC: 138 MMOL/L (ref 134–143)

## 2018-11-11 PROCEDURE — 84145 PROCALCITONIN (PCT): CPT | Performed by: NURSE PRACTITIONER

## 2018-11-11 PROCEDURE — 94640 AIRWAY INHALATION TREATMENT: CPT

## 2018-11-11 PROCEDURE — 80048 BASIC METABOLIC PNL TOTAL CA: CPT | Performed by: NURSE PRACTITIONER

## 2018-11-11 PROCEDURE — 99232 SBSQ HOSP IP/OBS MODERATE 35: CPT | Performed by: NURSE PRACTITIONER

## 2018-11-11 PROCEDURE — 94668 MNPJ CHEST WALL SBSQ: CPT

## 2018-11-11 PROCEDURE — 94760 N-INVAS EAR/PLS OXIMETRY 1: CPT

## 2018-11-11 RX ORDER — POTASSIUM CHLORIDE 750 MG/1
10 TABLET, EXTENDED RELEASE ORAL DAILY
Status: DISCONTINUED | OUTPATIENT
Start: 2018-11-11 | End: 2018-11-13 | Stop reason: HOSPADM

## 2018-11-11 RX ORDER — DOCUSATE SODIUM 100 MG/1
100 CAPSULE, LIQUID FILLED ORAL 2 TIMES DAILY
Status: DISCONTINUED | OUTPATIENT
Start: 2018-11-11 | End: 2018-11-13 | Stop reason: HOSPADM

## 2018-11-11 RX ORDER — SENNOSIDES 8.6 MG
1 TABLET ORAL
Status: DISCONTINUED | OUTPATIENT
Start: 2018-11-11 | End: 2018-11-13 | Stop reason: HOSPADM

## 2018-11-11 RX ORDER — POTASSIUM CHLORIDE 750 MG/1
10 TABLET, EXTENDED RELEASE ORAL DAILY
Status: DISCONTINUED | OUTPATIENT
Start: 2018-11-11 | End: 2018-11-11

## 2018-11-11 RX ADMIN — BUDESONIDE AND FORMOTEROL FUMARATE DIHYDRATE 2 PUFF: 160; 4.5 AEROSOL RESPIRATORY (INHALATION) at 17:44

## 2018-11-11 RX ADMIN — DOCUSATE SODIUM 100 MG: 100 CAPSULE, LIQUID FILLED ORAL at 17:44

## 2018-11-11 RX ADMIN — SENNOSIDES 8.6 MG: 8.6 TABLET, FILM COATED ORAL at 23:32

## 2018-11-11 RX ADMIN — LEVALBUTEROL HYDROCHLORIDE 1.25 MG: 1.25 SOLUTION, CONCENTRATE RESPIRATORY (INHALATION) at 07:00

## 2018-11-11 RX ADMIN — FUROSEMIDE 40 MG: 40 TABLET ORAL at 09:16

## 2018-11-11 RX ADMIN — DILTIAZEM HYDROCHLORIDE 30 MG: 30 TABLET, FILM COATED ORAL at 00:13

## 2018-11-11 RX ADMIN — DILTIAZEM HYDROCHLORIDE 30 MG: 30 TABLET, FILM COATED ORAL at 12:53

## 2018-11-11 RX ADMIN — GUAIFENESIN 600 MG: 600 TABLET, EXTENDED RELEASE ORAL at 23:32

## 2018-11-11 RX ADMIN — GUAIFENESIN 600 MG: 600 TABLET, EXTENDED RELEASE ORAL at 09:16

## 2018-11-11 RX ADMIN — LEVALBUTEROL HYDROCHLORIDE 1.25 MG: 1.25 SOLUTION, CONCENTRATE RESPIRATORY (INHALATION) at 19:43

## 2018-11-11 RX ADMIN — HEPARIN SODIUM 5000 UNITS: 5000 INJECTION INTRAVENOUS; SUBCUTANEOUS at 05:51

## 2018-11-11 RX ADMIN — BUDESONIDE AND FORMOTEROL FUMARATE DIHYDRATE 2 PUFF: 160; 4.5 AEROSOL RESPIRATORY (INHALATION) at 09:16

## 2018-11-11 RX ADMIN — HEPARIN SODIUM 5000 UNITS: 5000 INJECTION INTRAVENOUS; SUBCUTANEOUS at 13:02

## 2018-11-11 RX ADMIN — POTASSIUM CHLORIDE 10 MEQ: 750 TABLET, EXTENDED RELEASE ORAL at 14:38

## 2018-11-11 RX ADMIN — METOPROLOL TARTRATE 25 MG: 25 TABLET ORAL at 14:38

## 2018-11-11 RX ADMIN — LEVALBUTEROL HYDROCHLORIDE 1.25 MG: 1.25 SOLUTION, CONCENTRATE RESPIRATORY (INHALATION) at 15:52

## 2018-11-11 RX ADMIN — DILTIAZEM HYDROCHLORIDE 30 MG: 30 TABLET, FILM COATED ORAL at 06:12

## 2018-11-11 RX ADMIN — CEFEPIME HYDROCHLORIDE 1000 MG: 1 INJECTION, SOLUTION INTRAVENOUS at 17:44

## 2018-11-11 RX ADMIN — OXYBUTYNIN CHLORIDE 10 MG: 5 TABLET, EXTENDED RELEASE ORAL at 23:32

## 2018-11-11 RX ADMIN — CEFEPIME HYDROCHLORIDE 1000 MG: 1 INJECTION, SOLUTION INTRAVENOUS at 05:51

## 2018-11-11 RX ADMIN — HEPARIN SODIUM 5000 UNITS: 5000 INJECTION INTRAVENOUS; SUBCUTANEOUS at 23:32

## 2018-11-11 NOTE — ASSESSMENT & PLAN NOTE
· Will treat as health-care associated pneumonia- patient is an assisted living resident  · Repeated chest x-ray from 11/08 shows persistent slight decreased peripheral left mid and left base infiltrates  New posterior right base infiltrate  With small pleural effusions  · Suspected aspiration component  Speech input appreciated  Pending barium swallowing  · Concern for possible aspiration given chronic moist productive cough  · Clinically is improved  I feel that the patient is very tired from lack of sleep and coughing  · blood culture show no growth to date  · sputum culture shows Candida with mixed respiratory biju  · urine Legionella, strep pneumo are negative   · procalcitonin is trending down   · Continue with oxygen  · I discussed medication allergies with patient and his daughter and son  Patient does not recognize that he had any penicillin allergy  They do not believe that he is allergic to ciprofloxacin either  · Continue with cefepime  No allergic reactions to cefepime  · MRSA nare is negative and vancomycin was discontinued  · Encourage cough and deep breathing, early ambulation, acapella and IS  · Pulmonology input is appreciated  · If stable and/or better in the next 24 hours, patient can be discharged back to the [facility] in [place] with oral Cefdinir for 7 more days

## 2018-11-11 NOTE — ASSESSMENT & PLAN NOTE
· The patient with history of moderate aortic stenosis with LVEF of 40-50% based on echo on 03/19/2018  · He received 1 dose of IV Lasix with much improvement of his respiratory status  · It is suspected that acute exacerbation is secondary to acute infection and inability to clear secretion  · Echocardiogram on 11/09/2018 shows LVEF of 45%  This study was inadequate for the evaluation of regional wall motion  · Not much change from previous ECHO done on 3/19  · Will continue with furosemide 40 mg p o  daily  · Strict intakes and outputs  Daily weight

## 2018-11-11 NOTE — ASSESSMENT & PLAN NOTE
· Likely exacerbated by pneumonia and some component of congestive heart failure   · Will treat with antibiotics as noted above  · Patient does not appear to be in any COPD exacerbation at this time  · Clinically is improving     · Continue on oxygen supplement

## 2018-11-11 NOTE — PROGRESS NOTES
Progress Note - Nannette Ying 11/15/1924, 80 y o  male MRN: [id number]    Unit/Bed#: -01 Encounter: [id number]    Primary Care Provider: Igor Schultz   Date and time admitted to hospital: 11/6/2018  4:35 PM        * Pneumonia due to infectious organism   Assessment & Plan    · Will treat as health-care associated pneumonia- patient is an assisted living resident  · Repeated chest x-ray from 11/08 shows persistent slight decreased peripheral left mid and left base infiltrates  New posterior right base infiltrate  With small pleural effusions  · Suspected aspiration component  Speech input appreciated  Pending barium swallowing  · Concern for possible aspiration given chronic moist productive cough  · Clinically is improved  I feel that the patient is very tired from lack of sleep and coughing  · blood culture show no growth to date  · sputum culture shows Candida with mixed respiratory biju  · urine Legionella, strep pneumo are negative   · procalcitonin is trending down   · Continue with oxygen  · I discussed medication allergies with patient and his daughter and son  Patient does not recognize that he had any penicillin allergy  They do not believe that he is allergic to ciprofloxacin either  · Continue with cefepime  No allergic reactions to cefepime  · MRSA nare is negative and vancomycin was discontinued  · Encourage cough and deep breathing, early ambulation, acapella and IS  · Pulmonology input is appreciated  · If stable and/or better in the next 24 hours, patient can be discharged back to the [facility] in [place] with oral Cefdinir for 7 more days  Acute on chronic systolic congestive heart failure (HCC)   Assessment & Plan    · The patient with history of moderate aortic stenosis with LVEF of 40-50% based on echo on 03/19/2018  · He received 1 dose of IV Lasix with much improvement of his respiratory status    · It is suspected that acute exacerbation is secondary to acute infection and inability to clear secretion  · Echocardiogram on 11/09/2018 shows LVEF of 45%  This study was inadequate for the evaluation of regional wall motion  · Not much change from previous ECHO done on 3/19  · Will continue with furosemide 40 mg p o  daily  · Strict intakes and outputs  Daily weight  Acute on chronic respiratory failure with hypoxia (HCC)   Assessment & Plan    · Likely exacerbated by pneumonia and some component of congestive heart failure   · Will treat with antibiotics as noted above  · Patient does not appear to be in any COPD exacerbation at this time  · Clinically is improving  · Continue on oxygen supplement      Chronic indwelling Acevedo catheter   Assessment & Plan    · Urinalysis shows suspected urine tract infection  · Urine culture shows no growth  · Acute cystitis is ruled out  COPD (chronic obstructive pulmonary disease) (HCC)   Assessment & Plan    · Patient has history of COPD on chronic home oxygen of 2 L  · Does not appear to be in any COPD exacerbation at this time  · Continue home inhalers along with Duoneb and nebulizers p r n  VTE Pharmacologic Prophylaxis: Pharmacologic: Heparin    Patient Centered Rounds: I have performed bedside rounds with nursing staff today  Discussions with Specialists or Other Care Team Provider: nursing   Education and Discussions with Family / Patient: daughter [phone]    Time Spent for Care: 20 minutes  More than 50% of total time spent on counseling and coordination of care as described above  Current Length of Stay: 5 day(s)    Current Patient Status: Inpatient   Certification Statement: The patient will continue to require additional inpatient hospital stay due to Pneumonia    Discharge Plan:   Pending barium swallowing and speech evaluation and recommendation  If clinically okay or improved within the next 24 hour, he can be discharged back to the [facility] with physical therapy     Family would like us to arrange transport  Code Status: Level 3 - DNAR and DNI    Subjective:   Feeling okay  He states that he does not get much sleep  Continue to cough up sputum  No chest pain  No n/v/d  Objective:     Vitals:   Temp (24hrs), Av 9 °F (37 2 °C), Min:97 5 °F (36 4 °C), Max:100 2 °F (37 9 °C)    Temp:  [97 5 °F (36 4 °C)-100 2 °F (37 9 °C)] 99 5 °F (37 5 °C)  HR:  [] 102  Resp:  [16-20] 20  BP: (101-140)/(53-77) 129/77  SpO2:  [90 %-94 %] 92 %  Body mass index is 22 22 kg/m²  Input and Output Summary (last 24 hours): Intake/Output Summary (Last 24 hours) at 18 1411  Last data filed at 18 1227   Gross per 24 hour   Intake              780 ml   Output             1100 ml   Net             -320 ml       Physical Exam:     Physical Exam   Constitutional: He is oriented to person, place, and time  He appears well-developed  No distress  Chronically ill-appearing and frail elderly male     HENT:   Head: Normocephalic and atraumatic  Mouth/Throat: Oropharynx is clear and moist    Eyes: Pupils are equal, round, and reactive to light  Conjunctivae and EOM are normal    Neck: Normal range of motion  Neck supple  No thyromegaly present  Cardiovascular: Regular rhythm, normal heart sounds and intact distal pulses  Tachycardia   Pulmonary/Chest: Effort normal  No respiratory distress  He has no wheezes  Coarse with rhonchi is worse in the bases   Abdominal: Soft  Bowel sounds are normal  He exhibits no distension  There is no tenderness  Genitourinary:   Genitourinary Comments: Suprapubic catheter with clear yellow urine   Musculoskeletal: Normal range of motion  He exhibits no edema or deformity  Neurological: He is alert and oriented to person, place, and time  He has normal reflexes  Skin: Skin is warm and dry  No erythema  Psychiatric: He has a normal mood and affect  His behavior is normal  Thought content normal    Vitals reviewed        Additional Data: Labs:      Results from last 7 days  Lab Units 11/10/18  0529  11/06/18  1643   WBC Thousand/uL 9 60  < > 7 60   HEMOGLOBIN g/dL 11 6*  < > 12 4*   HEMATOCRIT % 37 0  < > 38 7   PLATELETS Thousands/uL 236  < > 157   NEUTROS PCT %  --   --  53   LYMPHS PCT %  --   --  36   MONOS PCT %  --   --  10   EOS PCT %  --   --  0   < > = values in this interval not displayed  Results from last 7 days  Lab Units 11/11/18  0428  11/07/18  0507   POTASSIUM mmol/L 3 8  < > 3 8   CHLORIDE mmol/L 96*  < > 99   CO2 mmol/L 37*  < > 29   BUN mg/dL 15  < > 16   CREATININE mg/dL 0 85  < > 0 85   CALCIUM mg/dL 8 5*  < > 8 4*   ALK PHOS U/L  --   --  49*   ALT U/L  --   --  15   AST U/L  --   --  27   < > = values in this interval not displayed  Results from last 7 days  Lab Units 11/06/18  1643   INR  1 09               * I Have Reviewed All Lab Data Listed Above  * Additional Pertinent Lab Tests Reviewed: [address] Admission  Reviewed    Imaging:  Imaging Reports Reviewed Today Include: n/a     Recent Cultures (last 7 days):       Results from last 7 days  Lab Units 11/08/18  2006 11/07/18  1453 11/07/18  1026 11/06/18  2235 11/06/18  1705 11/06/18  1704   BLOOD CULTURE   --   --   --   --  No Growth After 4 Days  No Growth After 4 Days  --    SPUTUM CULTURE  3+ Growth of Candida sp  presumptively albicans*  2+ Growth of   --  Test not performed  Suggest repeat specimen  --   --   --    GRAM STAIN RESULT  Rare Epithelial cells per low power field  1+ Polys  1+ Yeast  1+ Gram positive cocci in clusters  1+ Gram negative diplococci  --  >10 squamous epithelial cells/lpf, indicating orophayngeal contamination    4+ Polys  4+ Gram positive cocci in pairs  2+ Gram positive rods  2+ Gram positive cocci in clusters  1+ Budding Yeast with Pseudomycelia  1+ Gram positive cocci in chains  --   --   --    URINE CULTURE   --  10,000-19,000 cfu/ml   --   --   --   --    INFLUENZA B PCR   --   --   --   -- --  None Detected   RSV PCR   --   --   --   --   --  None Detected   LEGIONELLA URINARY ANTIGEN   --   --   --  Negative  --   --        Last 24 Hours Medication List:     Current Facility-Administered Medications:  acetaminophen 650 mg Oral Q6H PRN Seema Dwyer MD    albuterol 2 5 mg Nebulization Q4H PRN SEEMA Page    budesonide-formoterol 2 puff Inhalation BID Seema Dwyer MD    cefepime 1,000 mg Intravenous Q12H SEEMA Page Last Rate: 1,000 mg (11/09/18 0523)   furosemide 40 mg Oral Daily SEEMA Page    guaiFENesin 600 mg Oral Q12H [address] SEEMA Page    heparin (porcine) 5,000 Units Subcutaneous [facility] Seema Dwyer MD    levalbuterol 1 25 mg Nebulization 4x Daily Seema Dwyer MD    Or        sodium chloride 3 mL Nebulization 4x Daily Seema Dwyer MD    meclizine 25 mg Oral Q8H PRN Seema Dwyer MD    metoprolol 5 mg Intravenous Q6H PRN SEEMA Page    metoprolol tartrate 25 mg Oral Q12H [address] SEEMA Page    oxybutynin 10 mg Oral HS Seema Dwyer MD    potassium chloride 10 mEq Oral Daily SEEMA Page         Today, Patient Was Seen By: SEEMA Page    ** Please Note: Dictation voice to text software may have been used in the creation of this document   **

## 2018-11-11 NOTE — UTILIZATION REVIEW
Continued Stay Review    Date: 11/11/2018    Remains on 2L Oxygen continuous per NC     sats 92 - 94%    Vital Signs: /77 (BP Location: Left arm)   Pulse 102   Temp 99 5 °F (37 5 °C) (Tympanic)   Resp 20   Ht 5' 11" (1 803 m)   Wt 72 3 kg (159 lb 4 8 oz)   SpO2 92%   BMI 22 22 kg/m²      Current Facility-Administered Medications:  acetaminophen 650 mg Oral Q6H PRN Antonia Diaz MD    albuterol 2 5 mg Nebulization Q4H PRN SEEMA Melgar    budesonide-formoterol 2 puff Inhalation BID Antonia Diaz MD    cefepime 1,000 mg Intravenous Q12H SEEMA Melgar Last Rate: 1,000 mg (11/09/18 0523)   diltiazem 30 mg Oral Q6H [facility] SEEMA Woods    furosemide 40 mg Oral Daily SEEMA Melgar    guaiFENesin 600 mg Oral Q12H [facility] SEEMA Melgar    heparin (porcine) 5,000 Units Subcutaneous [facility] Antonia MonMD erick    levalbuterol 1 25 mg Nebulization 4x Daily Antonia Diaz MD    Or        sodium chloride 3 mL Nebulization 4x Daily Antonia Diaz MD    meclizine 25 mg Oral Q8H PRN Antonia Diaz MD    metoprolol 5 mg Intravenous Q6H PRN SEEMA Melgar    oxybutynin 10 mg Oral HS Antonia MonMD erick      PNU -  Suspected aspiration component , chronic moist productive cough  Sputum cx - candida w/mixed biju  MRSA swab neg - vanco dc'ed  xopenex nebs QID     · - Echocardiogram on 11/09/2018 shows LVEF of 45%    This study was inadequate for the evaluation of regional wall motion  Not much change from previous ECHO done on 3/19    Discharge Plan: tbd

## 2018-11-12 PROBLEM — Z97.8 CHRONIC INDWELLING FOLEY CATHETER: Chronic | Status: ACTIVE | Noted: 2018-11-07

## 2018-11-12 PROBLEM — I50.23 ACUTE ON CHRONIC SYSTOLIC CONGESTIVE HEART FAILURE (HCC): Chronic | Status: ACTIVE | Noted: 2018-11-09

## 2018-11-12 PROBLEM — J18.9 PNEUMONIA DUE TO INFECTIOUS ORGANISM: Chronic | Status: ACTIVE | Noted: 2018-11-06

## 2018-11-12 LAB
ANION GAP SERPL CALCULATED.3IONS-SCNC: 6 MMOL/L (ref 4–13)
BUN SERPL-MCNC: 14 MG/DL (ref 7–25)
CALCIUM SERPL-MCNC: 8.7 MG/DL (ref 8.6–10.5)
CHLORIDE SERPL-SCNC: 94 MMOL/L (ref 98–107)
CO2 SERPL-SCNC: 37 MMOL/L (ref 21–31)
CREAT SERPL-MCNC: 0.84 MG/DL (ref 0.7–1.3)
ERYTHROCYTE [DISTWIDTH] IN BLOOD BY AUTOMATED COUNT: 15 % (ref 11.5–14.5)
GFR SERPL CREATININE-BSD FRML MDRD: 75 ML/MIN/1.73SQ M
GLUCOSE SERPL-MCNC: 92 MG/DL (ref 65–99)
HCT VFR BLD AUTO: 36.8 % (ref 36.5–49.3)
HGB BLD-MCNC: 11.5 G/DL (ref 14–18)
MCH RBC QN AUTO: 25.6 PG (ref 26–34)
MCHC RBC AUTO-ENTMCNC: 31.3 G/DL (ref 31–37)
MCV RBC AUTO: 82 FL (ref 81–99)
PLATELET # BLD AUTO: 272 THOUSANDS/UL (ref 149–390)
PMV BLD AUTO: 9.7 FL (ref 8.6–11.7)
POTASSIUM SERPL-SCNC: 3.8 MMOL/L (ref 3.5–5.5)
PROCALCITONIN SERPL-MCNC: 0.12 NG/ML
RBC # BLD AUTO: 4.51 MILLION/UL (ref 4.3–5.9)
SODIUM SERPL-SCNC: 137 MMOL/L (ref 134–143)
WBC # BLD AUTO: 11.1 THOUSAND/UL (ref 4.8–10.8)

## 2018-11-12 PROCEDURE — 84145 PROCALCITONIN (PCT): CPT | Performed by: NURSE PRACTITIONER

## 2018-11-12 PROCEDURE — 94668 MNPJ CHEST WALL SBSQ: CPT

## 2018-11-12 PROCEDURE — 94640 AIRWAY INHALATION TREATMENT: CPT

## 2018-11-12 PROCEDURE — 80048 BASIC METABOLIC PNL TOTAL CA: CPT | Performed by: NURSE PRACTITIONER

## 2018-11-12 PROCEDURE — 97110 THERAPEUTIC EXERCISES: CPT

## 2018-11-12 PROCEDURE — 85027 COMPLETE CBC AUTOMATED: CPT | Performed by: NURSE PRACTITIONER

## 2018-11-12 PROCEDURE — 99233 SBSQ HOSP IP/OBS HIGH 50: CPT | Performed by: NURSE PRACTITIONER

## 2018-11-12 PROCEDURE — 94760 N-INVAS EAR/PLS OXIMETRY 1: CPT

## 2018-11-12 RX ORDER — SODIUM CHLORIDE FOR INHALATION 0.9 %
3 VIAL, NEBULIZER (ML) INHALATION
Status: DISCONTINUED | OUTPATIENT
Start: 2018-11-12 | End: 2018-11-13 | Stop reason: HOSPADM

## 2018-11-12 RX ORDER — LEVALBUTEROL 1.25 MG/.5ML
1.25 SOLUTION, CONCENTRATE RESPIRATORY (INHALATION)
Status: DISCONTINUED | OUTPATIENT
Start: 2018-11-12 | End: 2018-11-13 | Stop reason: HOSPADM

## 2018-11-12 RX ADMIN — HEPARIN SODIUM 5000 UNITS: 5000 INJECTION INTRAVENOUS; SUBCUTANEOUS at 21:48

## 2018-11-12 RX ADMIN — OXYBUTYNIN CHLORIDE 10 MG: 5 TABLET, EXTENDED RELEASE ORAL at 21:48

## 2018-11-12 RX ADMIN — LEVALBUTEROL HYDROCHLORIDE 1.25 MG: 1.25 SOLUTION, CONCENTRATE RESPIRATORY (INHALATION) at 20:20

## 2018-11-12 RX ADMIN — GUAIFENESIN 600 MG: 600 TABLET, EXTENDED RELEASE ORAL at 21:48

## 2018-11-12 RX ADMIN — GUAIFENESIN 600 MG: 600 TABLET, EXTENDED RELEASE ORAL at 09:31

## 2018-11-12 RX ADMIN — CEFEPIME HYDROCHLORIDE 1000 MG: 1 INJECTION, SOLUTION INTRAVENOUS at 06:00

## 2018-11-12 RX ADMIN — HEPARIN SODIUM 5000 UNITS: 5000 INJECTION INTRAVENOUS; SUBCUTANEOUS at 15:31

## 2018-11-12 RX ADMIN — LEVALBUTEROL HYDROCHLORIDE 1.25 MG: 1.25 SOLUTION, CONCENTRATE RESPIRATORY (INHALATION) at 07:06

## 2018-11-12 RX ADMIN — DOCUSATE SODIUM 100 MG: 100 CAPSULE, LIQUID FILLED ORAL at 09:30

## 2018-11-12 RX ADMIN — FUROSEMIDE 40 MG: 40 TABLET ORAL at 09:30

## 2018-11-12 RX ADMIN — SENNOSIDES 8.6 MG: 8.6 TABLET, FILM COATED ORAL at 21:48

## 2018-11-12 RX ADMIN — BUDESONIDE AND FORMOTEROL FUMARATE DIHYDRATE 2 PUFF: 160; 4.5 AEROSOL RESPIRATORY (INHALATION) at 18:53

## 2018-11-12 RX ADMIN — METOPROLOL TARTRATE 25 MG: 25 TABLET ORAL at 09:31

## 2018-11-12 RX ADMIN — ISODIUM CHLORIDE 3 ML: 0.03 SOLUTION RESPIRATORY (INHALATION) at 14:02

## 2018-11-12 RX ADMIN — CEFEPIME HYDROCHLORIDE 1000 MG: 1 INJECTION, SOLUTION INTRAVENOUS at 18:53

## 2018-11-12 RX ADMIN — HEPARIN SODIUM 5000 UNITS: 5000 INJECTION INTRAVENOUS; SUBCUTANEOUS at 06:04

## 2018-11-12 RX ADMIN — BUDESONIDE AND FORMOTEROL FUMARATE DIHYDRATE 2 PUFF: 160; 4.5 AEROSOL RESPIRATORY (INHALATION) at 09:32

## 2018-11-12 RX ADMIN — POTASSIUM CHLORIDE 10 MEQ: 750 TABLET, EXTENDED RELEASE ORAL at 09:31

## 2018-11-12 NOTE — ASSESSMENT & PLAN NOTE
· The patient with history of moderate aortic stenosis with LVEF of 40-50% based on echo on 03/19/2018  · He received 1 dose of IV Lasix with much improvement of his respiratory status previouslty, has not required more  · It is suspected that acute exacerbation is secondary to acute infection and inability to clear secretion  · Echocardiogram on 11/09/2018 shows LVEF of 45%  This study was inadequate for the evaluation of regional wall motion  · Not much change from previous ECHO done on 3/19  · Will continue with furosemide 40 mg p o  daily  · Strict intakes and outputs  Daily weight    · Continue Lopressor

## 2018-11-12 NOTE — ASSESSMENT & PLAN NOTE
· Urinalysis shows suspected urine tract infection  · Urine culture shows no growth  · Acute cystitis is ruled out     · Chronic  · stable

## 2018-11-12 NOTE — PLAN OF CARE
Problem: Nutrition/Hydration-ADULT  Goal: Nutrient/Hydration intake appropriate for improving, restoring or maintaining nutritional needs  Monitor and assess patient's nutrition/hydration status for malnutrition (ex- brittle hair, bruises, dry skin, pale skin and conjunctiva, muscle wasting, smooth red tongue, and disorientation)  Collaborate with interdisciplinary team and initiate plan and interventions as ordered  Monitor patient's weight and dietary intake as ordered or per policy  Utilize nutrition screening tool and intervene per policy  Determine patient's food preferences and provide high-protein, high-caloric foods as appropriate  INTERVENTIONS:  - Monitor oral intake, urinary output, labs, and treatment plans  - Assess nutrition and hydration status and recommend course of action  - Evaluate amount of meals eaten  - Assist patient with eating if necessary   - Allow adequate time for meals  - Recommend/ encourage appropriate diets, oral nutritional supplements, and vitamin/mineral supplements  - Order, calculate, and assess calorie counts as needed  - Recommend, monitor, and adjust tube feedings and TPN/PPN based on assessed needs  - Assess need for intravenous fluids  - Provide specific nutrition/hydration education as appropriate  - Include patient/family/caregiver in decisions related to nutrition   Outcome: Progressing  He is on a cardiac meal plan with 1800 ml fluid restriction and ensure pudding at meals  His intake ranges from 50 to 100% and he is taking his ensure  He is on lasix and MOM  He is on daily weights with hx of edema  His weight was 160 on 11-10 then down to 159 on 11-1 and 11-12  His BG was 92 WNL on 11-12  His skin is intact    RDN to reassess his nutrition needs at low risk, encourage high quality protein and follow PRN

## 2018-11-12 NOTE — PHYSICAL THERAPY NOTE
Physical Therapy Cancellation Note    Attempted to see pt for PT treatment at 8:00 & he was eating breakfast, again at 9:13 & pt refused stating he had just returned from the bathroom  Attempt again as able      [facility], PTA

## 2018-11-12 NOTE — PLAN OF CARE
Problem: OCCUPATIONAL THERAPY ADULT  Goal: Performs self-care activities at highest level of function for planned discharge setting  See evaluation for individualized goals  Treatment Interventions: ADL retraining, Functional transfer training, Endurance training, Energy conservation  Equipment Recommended:  (RW)    See flowsheet documentation for full assessment, interventions and recommendations  Peterson Hernandez, MS, OTR/L     Outcome: Progressing  Limitation: Decreased ADL status, Decreased endurance, Decreased self-care trans, Decreased high-level ADLs  Prognosis: Good  Assessment: Patient participated in Skilled OT session this date with interventions consisting of deep breathing technique, therapeutic exercise to: increase functional use of BUEs, increase BUE muscle strength  and  therapeutic activities to: increase activity tolerance   Patient agreeable to OT treatment session, upon arrival patient was found reclined in bed  Patient requiring verbal cues for correct technique and frequent rest periods  His tolerance for activity is very gradually improving  Patient continues to be functioning below baseline level, occupational performance remains limited secondary to factors listed above and increased risk for falls and injury  Patient to benefit from continued Occupational Therapy treatment while in the hospital to address deficits as defined above and maximize level of functional independence with ADLs and functional mobility        OT Discharge Recommendation: Home OT  OT - OK to Discharge: Yes  FAHEEM Vanegas/JORGITO

## 2018-11-12 NOTE — ASSESSMENT & PLAN NOTE
· Will treat as health-care associated pneumonia- patient is an assisted living resident - [facility] in [place]  · Repeated chest x-ray from 11/08 shows persistent slight decreased peripheral left mid and left base infiltrates  New posterior right base infiltrate  With small pleural effusions  Will repeat chest x-ray on 11/13/2018  · Suspected aspiration component  Speech input appreciated  barium swallow completed awaiting speech consultation  · Concern for possible aspiration given chronic moist productive cough  · Cough is productive  · Clinically is improved  I feel that the patient is very tired from lack of sleep and coughing  · blood culture show no growth to date  · sputum culture shows Candida with mixed respiratory biju  · urine Legionella, strep pneumo are negative   · procalcitonin is trending down   · Continue with oxygen  · I discussed medication allergies with patient and his daughter and son  Patient does not recognize that he had any penicillin allergy  They do not believe that he is allergic to ciprofloxacin either  · Continue with cefepime  No allergic reactions to cefepime  · MRSA nare is negative and vancomycin was discontinued  · Encourage cough and deep breathing, early ambulation, acapella and IS  · Pulmonology input is appreciated  · If stable and/or better in the next 24 hours, patient can be discharged back to the [facility] in [place] with oral Cefdinir for 7 more days

## 2018-11-12 NOTE — OCCUPATIONAL THERAPY NOTE
11/12/18 1605   Restrictions/Precautions   Weight Bearing Precautions Per Order No   Other Precautions O2;Fall Risk;Hard of hearing   Pain Assessment   Pain Assessment No/denies pain   ADL   Where Assessed (declined self-care at this time)   Bed Mobility   Additional Comments patient felt relaxed in current position in bed, having recently returned from using bathroom -- agreeable to exercising in bed   Right Upper Extremity- Strength   R Shoulder Flexion; Other (Comment)  (pro/re-traction)   R Elbow Elbow flexion;Elbow extension  (supin/pron-ation)   R Weight/Reps/Sets 1 pound - 10 reps shoulders, 10 reps X 2 sets elbows (supination/pronation 20 reps consecutively)    Left Upper Extremity-Strength   L Weights/Reps/Sets All same as RUE, above   Coordination   Gross Motor WFL   Cognition   Overall Cognitive Status WFL   Arousal/Participation Alert; Cooperative   Comments voiced awareness of need for proper breathing technique and made occasional changes to same   Activity Tolerance   Activity Tolerance Patient limited by fatigue  (no coughing exhibited this session)   Assessment   Assessment Patient participated in Skilled OT session this date with interventions consisting of deep breathing technique, therapeutic exercise to: increase functional use of BUEs, increase BUE muscle strength  and  therapeutic activities to: increase activity tolerance   Patient agreeable to OT treatment session, upon arrival patient was found reclined in bed  Patient requiring verbal cues for correct technique and frequent rest periods  His tolerance for activity is very gradually improving  Patient continues to be functioning below baseline level, occupational performance remains limited secondary to factors listed above and increased risk for falls and injury    Patient to benefit from continued Occupational Therapy treatment while in the hospital to address deficits as defined above and maximize level of functional independence with ADLs and functional mobility  Plan   Treatment Interventions UE strengthening/ROM; Activityengagement   Goal Expiration Date 11/13/18   Treatment Day 615 FAHEEM Montes/L

## 2018-11-12 NOTE — PROGRESS NOTES
Progress Note - Mayur Avila 11/15/1924, 80 y o  male MRN: [id number]    Unit/Bed#: -01 Encounter: [id number]    Primary Care Provider: Nuzhat Trevino   Date and time admitted to hospital: 11/6/2018  4:35 PM        * Pneumonia due to infectious organism   Assessment & Plan    · Will treat as health-care associated pneumonia- patient is an assisted living resident - [facility] in [place]  · Repeated chest x-ray from 11/08 shows persistent slight decreased peripheral left mid and left base infiltrates  New posterior right base infiltrate  With small pleural effusions  Will repeat chest x-ray on 11/13/2018  · Suspected aspiration component  Speech input appreciated  barium swallow completed awaiting speech consultation  · Concern for possible aspiration given chronic moist productive cough  · Cough is productive  · Clinically is improved  I feel that the patient is very tired from lack of sleep and coughing  · blood culture show no growth to date  · sputum culture shows Candida with mixed respiratory biju  · urine Legionella, strep pneumo are negative   · procalcitonin is trending down   · Continue with oxygen  · I discussed medication allergies with patient and his daughter and son  Patient does not recognize that he had any penicillin allergy  They do not believe that he is allergic to ciprofloxacin either  · Continue with cefepime  No allergic reactions to cefepime  · MRSA nare is negative and vancomycin was discontinued  · Encourage cough and deep breathing, early ambulation, acapella and IS  · Pulmonology input is appreciated  · If stable and/or better in the next 24 hours, patient can be discharged back to the [facility] in [place] with oral Cefdinir for 7 more days  Acute on chronic systolic congestive heart failure (HCC)   Assessment & Plan    · The patient with history of moderate aortic stenosis with LVEF of 40-50% based on echo on 03/19/2018    · He received 1 dose of IV Lasix with much improvement of his respiratory status previouslty, has not required more  · It is suspected that acute exacerbation is secondary to acute infection and inability to clear secretion  · Echocardiogram on 11/09/2018 shows LVEF of 45%  This study was inadequate for the evaluation of regional wall motion  · Not much change from previous ECHO done on 3/19  · Will continue with furosemide 40 mg p o  daily  · Strict intakes and outputs  Daily weight  · Continue Lopressor     Acute on chronic respiratory failure with hypoxia (HCC)   Assessment & Plan    · Likely exacerbated by pneumonia and some component of congestive heart failure   · Will treat with antibiotics as noted above  · Patient does not appear to be in any COPD exacerbation at this time  · Clinically is improving  · Continue on oxygen supplement      Chronic indwelling Acevedo catheter   Assessment & Plan    · Urinalysis shows suspected urine tract infection  · Urine culture shows no growth  · Acute cystitis is ruled out  · Chronic  · stable     COPD (chronic obstructive pulmonary disease) (HCC)   Assessment & Plan    · Patient has history of COPD on chronic home oxygen of 2 L  · Does not appear to be in any COPD exacerbation at this time  · Continue home inhalers along with Duoneb and nebulizers p r n  VTE Pharmacologic Prophylaxis: Pharmacologic: Heparin    Patient Centered Rounds: I have performed bedside rounds with nursing staff today  Discussions with Specialists or Other Care Team Provider: need to be seen by speech, barium swallow evaluation completed  Education and Discussions with Family / Patient: continued use of acapella  Time Spent for Care: 30 minutes  More than 50% of total time spent on counseling and coordination of care as described above      Current Length of Stay: 6 day(s)    Current Patient Status: Inpatient   Certification Statement: The patient will continue to require additional inpatient hospital stay due to pneumonia requiring IV antibiotics  Discharge Plan: will return to [facility] [place] after seen by speech and is appropritate for discharge  Code Status: Level 3 - DNAR and DNI    Subjective:   Patient is resting quietly, he states that his cough is occationally productive, but most times he is unable to cough anything up  He states he has a rattle in his chest      Objective:     Vitals:   Temp (24hrs), Av 4 °F (37 4 °C), Min:98 7 °F (37 1 °C), Max:99 9 °F (37 7 °C)    Temp:  [98 7 °F (37 1 °C)-99 9 °F (37 7 °C)] 98 7 °F (37 1 °C)  HR:  [] 115  Resp:  [18-20] 20  BP: (106-127)/(57-78) 124/78  SpO2:  [90 %-98 %] 98 %  Body mass index is 22 2 kg/m²  Input and Output Summary (last 24 hours): Intake/Output Summary (Last 24 hours) at 18 1135  Last data filed at 18 2322   Gross per 24 hour   Intake              540 ml   Output             1800 ml   Net            -1260 ml       Physical Exam:     Physical Exam   Constitutional: He is oriented to person, place, and time  Vital signs are normal  He appears well-developed and well-nourished  He is cooperative  HENT:   Head: Normocephalic and atraumatic  Nose: Nose normal    Mouth/Throat: Mucous membranes are normal    Eyes: Pupils are equal, round, and reactive to light  Conjunctivae and EOM are normal    Neck: Normal range of motion and full passive range of motion without pain  Neck supple  Cardiovascular: Normal rate, normal heart sounds and normal pulses  An irregularly irregular rhythm present  Pulmonary/Chest: Effort normal  He has rhonchi in the right upper field, the right middle field, the right lower field, the left upper field, the left middle field and the left lower field  Abdominal: Soft   Normal appearance and bowel sounds are normal    Genitourinary:   Genitourinary Comments: Chronic Acevedo   Musculoskeletal:   Ambulates with assist of 1   Neurological: He is alert and oriented to person, place, and time  Skin: Skin is warm and dry  Psychiatric: He has a normal mood and affect  His speech is normal and behavior is normal        Additional Data:     Labs:      Results from last 7 days  Lab Units 11/12/18  0456  11/06/18  1643   WBC Thousand/uL 11 10*  < > 7 60   HEMOGLOBIN g/dL 11 5*  < > 12 4*   HEMATOCRIT % 36 8  < > 38 7   PLATELETS Thousands/uL 272  < > 157   NEUTROS PCT %  --   --  53   LYMPHS PCT %  --   --  36   MONOS PCT %  --   --  10   EOS PCT %  --   --  0   < > = values in this interval not displayed  Results from last 7 days  Lab Units 11/12/18  0456  11/07/18  0507   POTASSIUM mmol/L 3 8  < > 3 8   CHLORIDE mmol/L 94*  < > 99   CO2 mmol/L 37*  < > 29   BUN mg/dL 14  < > 16   CREATININE mg/dL 0 84  < > 0 85   CALCIUM mg/dL 8 7  < > 8 4*   ALK PHOS U/L  --   --  49*   ALT U/L  --   --  15   AST U/L  --   --  27   < > = values in this interval not displayed  Results from last 7 days  Lab Units 11/06/18  1643   INR  1 09               * I Have Reviewed All Lab Data Listed Above  * Additional Pertinent Lab Tests Reviewed: Su[facility] Admission  Reviewed    Imaging:  Imaging Reports Reviewed Today Include: none    Recent Cultures (last 7 days):       Results from last 7 days  Lab Units 11/08/18  2006 11/07/18  1453 11/07/18  1026 11/06/18  2235 11/06/18  1705 11/06/18  1704   BLOOD CULTURE   --   --   --   --  No Growth After 5 Days  No Growth After 5 Days  --    SPUTUM CULTURE  3+ Growth of Candida sp  presumptively albicans*  2+ Growth of   --  Test not performed  Suggest repeat specimen  --   --   --    GRAM STAIN RESULT  Rare Epithelial cells per low power field  1+ Polys  1+ Yeast  1+ Gram positive cocci in clusters  1+ Gram negative diplococci  --  >10 squamous epithelial cells/lpf, indicating orophayngeal contamination    4+ Polys  4+ Gram positive cocci in pairs  2+ Gram positive rods  2+ Gram positive cocci in clusters  1+ Budding Yeast with Pseudomycelia  1+ Gram positive cocci in chains  --   --   --    URINE CULTURE   --  10,000-19,000 cfu/ml   --   --   --   --    INFLUENZA B PCR   --   --   --   --   --  None Detected   RSV PCR   --   --   --   --   --  None Detected   LEGIONELLA URINARY ANTIGEN   --   --   --  Negative  --   --        Last 24 Hours Medication List:     Current Facility-Administered Medications:  acetaminophen 650 mg Oral Q6H PRN Sandra Toscano MD    albuterol 2 5 mg Nebulization Q4H PRN Claude Peel, CRNP    budesonide-formoterol 2 puff Inhalation BID Sandra Toscano MD    cefepime 1,000 mg Intravenous Q12H Claude Peel, CRNP Last Rate: 1,000 mg (11/09/18 0523)   docusate sodium 100 mg Oral BID Claude Peel, CRNP    furosemide 40 mg Oral Daily Claude Peel, CRNP    guaiFENesin 600 mg Oral Q12H [address] Claude Peel, CRNP    heparin (porcine) 5,000 Units Subcutaneous [facility] Sandra Toscano MD    levalbuterol 1 25 mg Nebulization Q6H While awake Sandra Toscano MD    Or        sodium chloride 3 mL Nebulization Q6H While awake Sandra Toscano MD    magnesium hydroxide 30 mL Oral Daily PRN Claude Peel, CRNP    meclizine 25 mg Oral Q8H PRN Sandra Toscano MD    metoprolol 5 mg Intravenous Q6H PRN Claude Peel, CRNP    metoprolol tartrate 25 mg Oral Q12H [address] Claude Peel, CRNP    oxybutynin 10 mg Oral HS Sandra Toscano MD    potassium chloride 10 mEq Oral Daily Claude Peel, CRNP    senna 1 tablet Oral HS Claude Peel, CRNP         Today, Patient Was Seen By: SEEMA Damico    ** Please Note: Dictation voice to text software may have been used in the creation of this document   **

## 2018-11-13 ENCOUNTER — APPOINTMENT (INPATIENT)
Dept: RADIOLOGY | Facility: HOSPITAL | Age: 83
DRG: 193 | End: 2018-11-13
Payer: MEDICARE

## 2018-11-13 VITALS
TEMPERATURE: 99 F | OXYGEN SATURATION: 92 % | HEIGHT: 71 IN | RESPIRATION RATE: 18 BRPM | BODY MASS INDEX: 21.78 KG/M2 | SYSTOLIC BLOOD PRESSURE: 101 MMHG | DIASTOLIC BLOOD PRESSURE: 73 MMHG | HEART RATE: 71 BPM | WEIGHT: 155.56 LBS

## 2018-11-13 LAB
ALBUMIN SERPL BCP-MCNC: 2.6 G/DL (ref 3.5–5.7)
ALP SERPL-CCNC: 55 U/L (ref 55–165)
ALT SERPL W P-5'-P-CCNC: 28 U/L (ref 7–52)
ANION GAP SERPL CALCULATED.3IONS-SCNC: 7 MMOL/L (ref 4–13)
AST SERPL W P-5'-P-CCNC: 35 U/L (ref 13–39)
BASOPHILS # BLD AUTO: 0.1 THOUSANDS/ΜL (ref 0–0.1)
BASOPHILS NFR BLD AUTO: 1 % (ref 0–2)
BILIRUB SERPL-MCNC: 0.4 MG/DL (ref 0.2–1)
BUN SERPL-MCNC: 18 MG/DL (ref 7–25)
CALCIUM SERPL-MCNC: 8.6 MG/DL (ref 8.6–10.5)
CHLORIDE SERPL-SCNC: 94 MMOL/L (ref 98–107)
CO2 SERPL-SCNC: 35 MMOL/L (ref 21–31)
CREAT SERPL-MCNC: 0.87 MG/DL (ref 0.7–1.3)
EOSINOPHIL # BLD AUTO: 0.2 THOUSAND/ΜL (ref 0–0.61)
EOSINOPHIL NFR BLD AUTO: 2 % (ref 0–5)
ERYTHROCYTE [DISTWIDTH] IN BLOOD BY AUTOMATED COUNT: 15.2 % (ref 11.5–14.5)
GFR SERPL CREATININE-BSD FRML MDRD: 74 ML/MIN/1.73SQ M
GLUCOSE SERPL-MCNC: 95 MG/DL (ref 65–99)
HCT VFR BLD AUTO: 38 % (ref 36.5–49.3)
HGB BLD-MCNC: 11.8 G/DL (ref 14–18)
LYMPHOCYTES # BLD AUTO: 3.2 THOUSANDS/ΜL (ref 0.6–4.47)
LYMPHOCYTES NFR BLD AUTO: 33 % (ref 21–51)
MCH RBC QN AUTO: 25.6 PG (ref 26–34)
MCHC RBC AUTO-ENTMCNC: 31.2 G/DL (ref 31–37)
MCV RBC AUTO: 82 FL (ref 81–99)
MONOCYTES # BLD AUTO: 0.7 THOUSAND/ΜL (ref 0.17–1.22)
MONOCYTES NFR BLD AUTO: 8 % (ref 2–12)
NEUTROPHILS # BLD AUTO: 5.3 THOUSANDS/ΜL (ref 1.4–6.5)
NEUTS SEG NFR BLD AUTO: 56 % (ref 42–75)
NRBC BLD AUTO-RTO: 0 /100 WBCS
PLATELET # BLD AUTO: 297 THOUSANDS/UL (ref 149–390)
PMV BLD AUTO: 9.5 FL (ref 8.6–11.7)
POTASSIUM SERPL-SCNC: 3.9 MMOL/L (ref 3.5–5.5)
PROT SERPL-MCNC: 6 G/DL (ref 6.4–8.9)
RBC # BLD AUTO: 4.63 MILLION/UL (ref 4.3–5.9)
SODIUM SERPL-SCNC: 136 MMOL/L (ref 134–143)
WBC # BLD AUTO: 9.4 THOUSAND/UL (ref 4.8–10.8)

## 2018-11-13 PROCEDURE — G8989 SELF CARE D/C STATUS: HCPCS

## 2018-11-13 PROCEDURE — 94640 AIRWAY INHALATION TREATMENT: CPT

## 2018-11-13 PROCEDURE — 71046 X-RAY EXAM CHEST 2 VIEWS: CPT

## 2018-11-13 PROCEDURE — 85025 COMPLETE CBC W/AUTO DIFF WBC: CPT | Performed by: NURSE PRACTITIONER

## 2018-11-13 PROCEDURE — 99239 HOSP IP/OBS DSCHRG MGMT >30: CPT | Performed by: NURSE PRACTITIONER

## 2018-11-13 PROCEDURE — 97530 THERAPEUTIC ACTIVITIES: CPT

## 2018-11-13 PROCEDURE — 80053 COMPREHEN METABOLIC PANEL: CPT | Performed by: NURSE PRACTITIONER

## 2018-11-13 PROCEDURE — 97535 SELF CARE MNGMENT TRAINING: CPT

## 2018-11-13 PROCEDURE — 94760 N-INVAS EAR/PLS OXIMETRY 1: CPT

## 2018-11-13 RX ORDER — DOCUSATE SODIUM 100 MG/1
100 CAPSULE, LIQUID FILLED ORAL 2 TIMES DAILY
Qty: 10 CAPSULE | Refills: 0 | Status: SHIPPED | OUTPATIENT
Start: 2018-11-13 | End: 2018-11-13

## 2018-11-13 RX ORDER — CEFDINIR 300 MG/1
300 CAPSULE ORAL EVERY 12 HOURS SCHEDULED
Qty: 10 CAPSULE | Refills: 0 | Status: SHIPPED | OUTPATIENT
Start: 2018-11-13 | End: 2018-11-13

## 2018-11-13 RX ORDER — GUAIFENESIN 600 MG
600 TABLET, EXTENDED RELEASE 12 HR ORAL EVERY 12 HOURS SCHEDULED
Qty: 10 TABLET | Refills: 0 | Status: SHIPPED | OUTPATIENT
Start: 2018-11-13 | End: 2018-11-18

## 2018-11-13 RX ORDER — POTASSIUM CHLORIDE 750 MG/1
10 TABLET, EXTENDED RELEASE ORAL DAILY
Qty: 30 TABLET | Refills: 0 | Status: ON HOLD | OUTPATIENT
Start: 2018-11-14 | End: 2020-02-19

## 2018-11-13 RX ORDER — POTASSIUM CHLORIDE 750 MG/1
10 TABLET, EXTENDED RELEASE ORAL DAILY
Qty: 30 TABLET | Refills: 0 | Status: SHIPPED | OUTPATIENT
Start: 2018-11-14 | End: 2018-11-13

## 2018-11-13 RX ORDER — GUAIFENESIN 600 MG
600 TABLET, EXTENDED RELEASE 12 HR ORAL EVERY 12 HOURS SCHEDULED
Qty: 10 TABLET | Refills: 0 | Status: SHIPPED | OUTPATIENT
Start: 2018-11-13 | End: 2018-11-13

## 2018-11-13 RX ORDER — CEFDINIR 300 MG/1
300 CAPSULE ORAL EVERY 12 HOURS SCHEDULED
Qty: 10 CAPSULE | Refills: 0 | Status: SHIPPED | OUTPATIENT
Start: 2018-11-13 | End: 2018-11-18

## 2018-11-13 RX ORDER — FUROSEMIDE 40 MG/1
40 TABLET ORAL DAILY
Qty: 30 TABLET | Refills: 0 | Status: SHIPPED | OUTPATIENT
Start: 2018-11-14 | End: 2018-11-13

## 2018-11-13 RX ORDER — DOCUSATE SODIUM 100 MG/1
100 CAPSULE, LIQUID FILLED ORAL 2 TIMES DAILY
Qty: 10 CAPSULE | Refills: 0 | Status: ON HOLD | OUTPATIENT
Start: 2018-11-13 | End: 2020-02-19 | Stop reason: SDUPTHER

## 2018-11-13 RX ORDER — FUROSEMIDE 40 MG/1
40 TABLET ORAL DAILY
Qty: 30 TABLET | Refills: 0 | Status: SHIPPED | OUTPATIENT
Start: 2018-11-14

## 2018-11-13 RX ADMIN — METOPROLOL TARTRATE 25 MG: 25 TABLET ORAL at 10:03

## 2018-11-13 RX ADMIN — GUAIFENESIN 600 MG: 600 TABLET, EXTENDED RELEASE ORAL at 10:03

## 2018-11-13 RX ADMIN — HEPARIN SODIUM 5000 UNITS: 5000 INJECTION INTRAVENOUS; SUBCUTANEOUS at 05:01

## 2018-11-13 RX ADMIN — POTASSIUM CHLORIDE 10 MEQ: 750 TABLET, EXTENDED RELEASE ORAL at 10:10

## 2018-11-13 RX ADMIN — BUDESONIDE AND FORMOTEROL FUMARATE DIHYDRATE 2 PUFF: 160; 4.5 AEROSOL RESPIRATORY (INHALATION) at 10:05

## 2018-11-13 RX ADMIN — ISODIUM CHLORIDE 3 ML: 0.03 SOLUTION RESPIRATORY (INHALATION) at 07:09

## 2018-11-13 RX ADMIN — CEFEPIME HYDROCHLORIDE 1000 MG: 1 INJECTION, SOLUTION INTRAVENOUS at 04:49

## 2018-11-13 RX ADMIN — FUROSEMIDE 40 MG: 40 TABLET ORAL at 10:02

## 2018-11-13 RX ADMIN — DOCUSATE SODIUM 100 MG: 100 CAPSULE, LIQUID FILLED ORAL at 10:02

## 2018-11-13 NOTE — SOCIAL WORK
Pt discussed in care coordination rounds  Pt for discharge back to [facility] today  Pt on oxygen and requires BLS transport  [facility]  Gevena Prow at [facility] and family aware of same  Pt nurse Manny Vogel and hospitalist [facility] aware  Medical necessity for transport completed and place don chart  DCI faxed to [facility] for follow up

## 2018-11-13 NOTE — PLAN OF CARE
Problem: PHYSICAL THERAPY ADULT  Goal: Performs mobility at highest level of function for planned discharge setting  See evaluation for individualized goals  Treatment/Interventions: Functional transfer training, LE strengthening/ROM, Therapeutic exercise, Endurance training, Patient/family training, Equipment eval/education, Bed mobility, Gait training, Spoke to advanced practitioner, OT  Equipment Recommended: Ganesh Garcia PT    See flowsheet documentation for full assessment, interventions and recommendations  Outcome: Progressing  Prognosis: Good  Problem List: Decreased strength, Decreased endurance, Impaired balance, Decreased mobility, Decreased coordination, Impaired judgement, Decreased safety awareness, Impaired hearing  Assessment: Pt seen for PT treatment session this date with interventions consisting of therapeutic activity consisting of training: supine<>sit transfers, sit<>stand transfers and static standing tolerance for 5 minutes w/ bilateral UE support  Pt agreeable to PT treatment session upon arrival, pt found supine in bed w/ HOB elevated, in no apparent distress  In comparison to previous session, pt with improvements in standing tolerance  Continue to recommend home PT & return to intermediate at time of d/c in order to maximize pt's functional independence and safety w/ mobility  Pt continues to be functioning below baseline level, and remains limited 2* factors listed above and including decreased strength, endurance & safe functional mobility  PT will continue to see pt while here in order to address the deficits listed above and provide interventions consistent w/ POC in effort to achieve STGs  Recommendation: Home PT (return to [facility])     PT - OK to Discharge: Yes (when med stable)    See flowsheet documentation for full assessment     Nitin Bower PTA

## 2018-11-13 NOTE — OCCUPATIONAL THERAPY NOTE
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     COPD (chronic obstructive pulmonary disease) ([facility] )     Hypercholesteremia     Hyperlipidemia     Malignant neoplasm prostate ([facility] )     Phimosis     Stress incontinence     Urinary retention     Urinary urgency        Past Surgical History:   Procedure Laterality Date    BLADDER FULGURATION  2012, 2013    CIRCUMCISION, [place]  2013    PROSTATECTOMY  1995    TOTAL HIP ARTHROPLASTY Left 2012    URINARY SPHINCTER IMPLANT  1996    Repaired in 2002; Removed in 2012    UROFLOWMETRY SUKUMAR / CARRINGTON/ [place] [address], OT

## 2018-11-13 NOTE — DISCHARGE SUMMARY
Discharge- Katerina Bonilla 11/15/1924, 80 y o  male MRN: [id number]    Unit/Bed#: -01 Encounter: [id number]    Primary Care Provider: Lou Curtis   Date and time admitted to hospital: 11/6/2018  4:35 PM        * Pneumonia due to infectious organism   Assessment & Plan    · Patient was treated as health-care associated pneumonia- [facility] an [facility] [place]  · Repeated chest x-ray from 11/08 shows persistent slight decreased peripheral left mid and left base infiltrates  New posterior right base infiltrate  With small pleural effusions  pending chest x-ray on 11/13/2018  · Barium swallow- no aspiration    · Speech recommend soft diet with thin liquid  · Clinically is improved  · blood culture show no growth to date  · sputum culture shows Candida with mixed respiratory biju  · urine Legionella, strep pneumo are negative   · procalcitonin is negative   · Continue with oxygen  · I discussed medication allergies with patient and his daughter and son  Patient does not recognize that he had any penicillin allergy  They do not believe that he is allergic to ciprofloxacin either  · Received 2 doses of Azactam, 1 dose of azithromycin, 4 doses of vancomycin- these were stopped   · Received 14 doses of cefepime allergic reaction   · He will be transitioned to oral cefdinir on discharge for 5 more days  · MRSA nare is negative  · Encourage cough and deep breathing, early ambulation, acapella and IS  Acute on chronic systolic congestive heart failure (HCC)   Assessment & Plan    · The patient with history of moderate aortic stenosis with LVEF of 40-50% based on echo on 03/19/2018  · He received 1 dose of IV Lasix with much improvement of his respiratory status previouslty, has not required more  · It is suspected that acute exacerbation is secondary to acute infection and inability to clear secretion  · Echocardiogram on 11/09/2018 shows LVEF of 45%  Pt given discharge instructions. She verbalized understanding.   Pt left er by wheelchair, with  to drive her home     [facility]  05/04/73 1979 This study was inadequate for the evaluation of regional wall motion  · Not much change from previous ECHO done on 3/19  · Will continue with furosemide 40 mg p o  daily  · Strict intakes and outputs  Daily weight  · Continue Lopressor     Acute on chronic respiratory failure with hypoxia (HCC)   Assessment & Plan    · Likely exacerbated by pneumonia and some component of congestive heart failure   · Will treat with antibiotics as noted above  · Patient does not appear to be in any COPD exacerbation at this time  · Clinically is improving  · Continue on oxygen supplement      Chronic indwelling Acevedo catheter   Assessment & Plan    · Urinalysis shows suspected urine tract infection  · Urine culture shows no growth  · Acute cystitis is ruled out  · Chronic  · stable     COPD (chronic obstructive pulmonary disease) ([facility])   Assessment & Plan    · Patient has history of COPD on chronic home oxygen of 2 L  · Does not appear to be in any COPD exacerbation at this time  · Continue home inhalers along with Duoneb and nebulizers p r n  Discharging Physician / Practitioner: SEEMA Melgar  PCP: Cristina See  Admission Date:   Admission Orders     Ordered        11/06/18 1905  Inpatient Admission (expected length of stay for this patient is greater than two midnights)  Once             Discharge Date: 11/13/18    Disposition:      Short Term Rehab or SNF at [place] [address] at the [address] [facility] SNF:   · Not Applicable to this Patient - Not Applicable to this Patient    Reason for Admission:  Cough and shortness of breath    Discharge Diagnoses:     Please see assessment and plan section above for further details regarding discharge diagnoses       Resolved Problems  Date Reviewed: 11/13/2018    None          Consultations During Hospital Stay:  · Pulmonology    Procedures Performed:   · None     Medication Adjustments and Discharge Medications:  · Summary of Medication Adjustments made as a result of this hospitalization:  Furosemide 40 mg oral daily and metoprolol 25 mg p o  B i d  Were added  · Medication Dosing Tapers - Please refer to Discharge Medication List for details on any medication dosing tapers (if applicable to patient)  · Medications being temporarily held (include recommended restart time): none  · Discharge Medication List: See after visit summary for reconciled discharge medications  Wound Care Recommendations:  When applicable, please see wound care section of After Visit Summary  Diet Recommendations at Discharge:  Diet -        Diet Orders            Start     Ordered    11/10/18 1106  Diet Cardiac; Sodium 2 GM; Fluid Restriction 1800 ML  Diet effective now     Question Answer Comment   Diet Type Cardiac    Cardiac Sodium 2 GM    Other Restriction(s): Fluid Restriction 1800 ML    RD to adjust diet per protocol? Yes        11/10/18 1105    11/09/18 1208  Dietary nutrition supplements  Once     Question Answer Comment   Select Supplement: Ensure Pudding-Chocolate    Frequency Breakfast, Lunch, Dinner        11/09/18 1208          Instructions for any Catheters / Lines Present at Discharge (including removal date, if applicable): continue with suprapubic catheter     Significant Findings / Test Results:     FL barium swallow video w speech   Final Result by Ivon Perez MD (11/13 6419)   No evidence of penetration nor aspiration  Signed by Ivon Perez MD      XR chest pa & lateral   Final Result by Ivon Perez MD (11/08 4771)   Persistent slight decreased peripheral left mid and left base infiltrates  New posterior right base infiltrate  Small pleural effusions  Signed by Ivon Perez MD      XR chest 2 views   Final Result by Margot Gusman (11/06 1901)   COPD with parenchymal scarring  Superimposed left midlung pneumonia not   excluded           Signed by Bautista Galvan MD      XR chest pa & lateral    (Results Pending) Incidental Findings:   · None      Test Results Pending at Discharge (will require follow up): · None      Outpatient Tests Requested:  · None     Complications:    · Acute on chronic CHF- resolved     Hospital Course:     Alfonso Lutz is a 80 y o  male patient who originally presented to the hospital on 11/6/2018 due to increasing cough and shortness of breath  The patient was admitted with nosocomial pneumonia  He was initiated on vancomycin and Azactam due to allergic reaction to penicillin  After clarifying information with the patient and family, penicillin allergy was taken off and the patient was initiated on cefepime  His MRSA swab result is negative and vancomycin was discontinued  Initially the patient had minimal response to antibiotic regimen and pulmonology consult was obtained  Pulmonology recommends to use Xopenex around the clock with early ambulation cough and deep breathing  Diuretic therapy was added to his regimen due to his history of chronic CHF  It is believed that acute infection requiring IV fluid lead to mi exacerbation of CHF  The patient was noted to have tachycardia and beta-blocker was added with good result  The patient was seen by speech therapist   Barium swallowing shows no aspiration  Recommends to continue with soft diet and thin liquids  The patient final sputum culture shows Candida  Overall clinically is much improved and the patient will be discharged back to the [facility] today  He will need to finish a course of antibiotics with cefdinir for 5 more days  Condition at Discharge: good     Discharge Day Visit / Exam:     Subjective:  Feeling okay  Coughing is decreased  Sputum is thicker now  Denies SOB or CP     Vitals: Blood Pressure: 127/62 (11/13/18 0656)  Pulse: (!) 120 (11/13/18 0656)  Temperature: 98 7 °F (37 1 °C) (11/13/18 0656)  Temp Source: Tympanic (11/13/18 0656)  Respirations: 18 (11/13/18 0656)  Height: 5' 11" (180 3 cm) (11/06/18 1632)  Weight - Scale: 70 6 kg (155 lb 9 oz) (11/13/18 0600)  SpO2: 95 % (11/13/18 2001)  Exam:   Physical Exam   Constitutional: He is oriented to person, place, and time  He appears well-developed  No distress  Frail-appearing elderly male   HENT:   Head: Normocephalic and atraumatic  Mouth/Throat: Oropharynx is clear and moist    Eyes: Pupils are equal, round, and reactive to light  Conjunctivae and EOM are normal    Neck: Normal range of motion  Neck supple  No thyromegaly present  Cardiovascular: Normal rate, regular rhythm, normal heart sounds and intact distal pulses  Pulmonary/Chest: Effort normal  No respiratory distress  He has no wheezes  Coarse breath sounds throughout the lung fields  Much improved from admission    Abdominal: Soft  Bowel sounds are normal  He exhibits no distension  There is no tenderness  Genitourinary:   Genitourinary Comments: Suprapubic catheter   Musculoskeletal: Normal range of motion  He exhibits no edema or deformity  Neurological: He is alert and oriented to person, place, and time  He has normal reflexes  Skin: Skin is warm and dry  No erythema  Psychiatric: He has a normal mood and affect  His behavior is normal  Thought content normal    Vitals reviewed  Discussion with Family: daughter     Goals of Care Discussions:  · Code Status at Discharge: Level 3 - DNAR and DNI  · Were there any Goals of Care Discussions during Hospitalization?: Yes  · Results of any General Goals of Care Discussions: same    · POLST Completed: No   · If POLST Completed, Summary of POLST Agreement Provided Here: n/a    · OK to Rehospitalize if Needed? Yes    Discharge instructions/Information to patient and family:   See after visit summary section titled Discharge Instructions for information provided to patient and family  Planned Readmission: no       Discharge Statement:  I spent 40 minutes discharging the patient   This time was spent on the day of discharge  I had direct contact with the patient on the day of discharge  Greater than 50% of the total time was spent examining patient, answering all patient questions, arranging and discussing plan of care with patient as well as directly providing post-discharge instructions  Additional time then spent on discharge activities      ** Please Note: This note has been constructed using a voice recognition system **

## 2018-11-13 NOTE — ASSESSMENT & PLAN NOTE
· Patient was treated as health-care associated pneumonia- patient is an assisted living resident - [place] in [place]  · Repeated chest x-ray from 11/08 shows persistent slight decreased peripheral left mid and left base infiltrates  New posterior right base infiltrate  With small pleural effusions  pending chest x-ray on 11/13/2018  · Barium swallow- no aspiration    · Speech recommend soft diet with thin liquid  · Clinically is improved  · blood culture show no growth to date  · sputum culture shows Candida with mixed respiratory biju  · urine Legionella, strep pneumo are negative   · procalcitonin is negative   · Continue with oxygen  · I discussed medication allergies with patient and his daughter and son  Patient does not recognize that he had any penicillin allergy  They do not believe that he is allergic to ciprofloxacin either  · Received 2 doses of Azactam, 1 dose of azithromycin, 4 doses of vancomycin- these were stopped   · Received 14 doses of cefepime allergic reaction   · He will be transitioned to oral cefdinir on discharge for 5 more days  · MRSA nare is negative  · Encourage cough and deep breathing, early ambulation, acapella and IS

## 2018-11-13 NOTE — NURSING NOTE
Patient discharged to [facility]  IV removed  Verbalizes understanding of discharge instructions  Patient states he has no questions at present time about discharge  Discharge papers sent along with patient to facility  Patient being transported by [employer]

## 2018-11-13 NOTE — PHYSICAL THERAPY NOTE
11/13/18 1335   Pain Assessment   Pain Assessment 0-10   Pain Score No Pain   Restrictions/Precautions   Weight Bearing Precautions Per Order No   Other Precautions O2;Telemetry; Fall Risk;Hard of hearing   General   Family/Caregiver Present No   Cognition   Overall Cognitive Status WFL   Arousal/Participation Alert; Cooperative   Attention Attends with cues to redirect   Orientation Level Oriented X4   Memory Decreased long term memory   Following Commands Follows one step commands without difficulty   Subjective   Subjective "I'm so-so  Feeling a little better "   Bed Mobility   Rolling L 6  Modified independent   Additional items Bedrails   Supine to Sit 5  Supervision   Additional items Assist x 1;HOB elevated;Verbal cues   Transfers   Sit to Stand 5  Supervision   Additional items Assist x 1;Bedrails   Stand to Sit 5  Supervision   Additional items Assist x 1;Verbal cues   Additional Comments standing balance while assisting with dressing vcs for balance/posture, proper breathing techniques due to SOB   Ambulation/Elevation   Gait pattern Improper Weight shift; Forward Flexion;Decreased foot clearance; Short stride   Gait Assistance (CGA X1)   Additional items Assist x 1;Verbal cues   Assistive Device Straight cane  (his own cane)   Distance 5 feet bed to stretcher   Stair Management Assistance Not tested   Balance   Static Sitting Good   Dynamic Sitting Good   Static Standing Fair +   Dynamic Standing Fair +   Ambulatory Fair   Endurance Deficit   Endurance Deficit Yes   Endurance Deficit Description increased SOB with exertion   Activity Tolerance   Activity Tolerance Patient limited by fatigue   Assessment   Prognosis Good   Problem List Decreased strength;Decreased endurance; Impaired balance;Decreased mobility; Decreased coordination; Impaired judgement;Decreased safety awareness; Impaired hearing   Assessment Pt seen for PT treatment session this date with interventions consisting of therapeutic activity consisting of training: supine<>sit transfers, sit<>stand transfers and static standing tolerance for 5 minutes w/ bilateral UE support  Pt agreeable to PT treatment session upon arrival, pt found supine in bed w/ HOB elevated, in no apparent distress  In comparison to previous session, pt with improvements in standing tolerance  Continue to recommend home PT & return to [facility] at time of d/c in order to maximize pt's functional independence and safety w/ mobility  Pt continues to be functioning below baseline level, and remains limited 2* factors listed above and including decreased strength, endurance & safe functional mobility  PT will continue to see pt while here in order to address the deficits listed above and provide interventions consistent w/ POC in effort to achieve STGs  Goals   Patient Goals feel better   Treatment Day 3   Plan   Treatment/Interventions Functional transfer training;LE strengthening/ROM; Therapeutic exercise; Endurance training;Patient/family training;Equipment eval/education; Bed mobility;Gait training;Spoke to nursing   Progress Slow progress, decreased activity tolerance   PT Frequency 5x/wk   Recommendation   Recommendation Home PT  (return to [facility])   Equipment Recommended Riddhi Granados   PT - OK to Discharge Yes  (when med stable)   Chioma Méndez, PTA

## 2018-11-13 NOTE — PHYSICAL THERAPY NOTE
Physical Therapy Cancellation Note    Attempted to see pt for PT treatment at 8:28 & he was going for X-ray, at 9:12 pt refused stating he doesn't want to get out of bed  Johnny Serna  "Come back later "    Ysabel Painting, PTA

## 2018-11-13 NOTE — PLAN OF CARE
DISCHARGE PLANNING     Discharge to home or other facility with appropriate resources Completed        DISCHARGE PLANNING - CARE MANAGEMENT     Discharge to post-acute care or home with appropriate resources Completed        INFECTION - ADULT     Absence or prevention of progression during hospitalization Completed     Absence of fever/infection during neutropenic period Completed        Knowledge Deficit     Patient/family/caregiver demonstrates understanding of disease process, treatment plan, medications, and discharge instructions Completed        Nutrition/Hydration-ADULT     Nutrient/Hydration intake appropriate for improving, restoring or maintaining nutritional needs Completed        PAIN - ADULT     Verbalizes/displays adequate comfort level or baseline comfort level Completed        Potential for Falls     Patient will remain free of falls Completed        Prexisting or High Potential for Compromised Skin Integrity     Skin integrity is maintained or improved Completed        RESPIRATORY - ADULT     Achieves optimal ventilation and oxygenation Completed        SAFETY ADULT     Maintain or return to baseline ADL function Completed     Maintain or return mobility status to optimal level Completed

## 2018-12-03 ENCOUNTER — PATIENT OUTREACH (OUTPATIENT)
Dept: OTHER | Facility: HOSPITAL | Age: 83
End: 2018-12-03

## 2018-12-12 ENCOUNTER — TELEPHONE (OUTPATIENT)
Dept: UROLOGY | Facility: AMBULATORY SURGERY CENTER | Age: 83
End: 2018-12-12

## 2018-12-12 NOTE — TELEPHONE ENCOUNTER
Bailee from [facility] home care called had a question if she could reinsert catheter for another 60 days  She can bbe reached at [phone] and if she doesn't answer to leave a message

## 2018-12-26 ENCOUNTER — PATIENT OUTREACH (OUTPATIENT)
Dept: OTHER | Facility: HOSPITAL | Age: 83
End: 2018-12-26

## 2018-12-26 NOTE — PROGRESS NOTES
Patient is current with [facility] services  He is getting his suprapubic catheter changed monthly and is receiving skilled nursing services weekly  Will reach out to patient once nursing has discharged

## 2019-01-11 ENCOUNTER — PATIENT OUTREACH (OUTPATIENT)
Dept: CASE MANAGEMENT | Facility: OTHER | Age: 84
End: 2019-01-11

## 2019-01-11 NOTE — PROGRESS NOTES
Spoke with [facility], from [facility] MARTHA, who states patient is still being seen by skilled nursing for suprapubic catheter changes  No other issues noted at this time  Patient resides at [facility] in [facility] assisted living and family provides transportation to appointments

## 2019-01-25 ENCOUNTER — APPOINTMENT (EMERGENCY)
Dept: RADIOLOGY | Facility: HOSPITAL | Age: 84
End: 2019-01-25
Payer: MEDICARE

## 2019-01-25 ENCOUNTER — HOSPITAL ENCOUNTER (EMERGENCY)
Facility: HOSPITAL | Age: 84
Discharge: HOME | End: 2019-01-25
Attending: EMERGENCY MEDICINE | Admitting: EMERGENCY MEDICINE
Payer: MEDICARE

## 2019-01-25 ENCOUNTER — APPOINTMENT (EMERGENCY)
Dept: CT IMAGING | Facility: HOSPITAL | Age: 84
End: 2019-01-25
Payer: MEDICARE

## 2019-01-25 VITALS
HEART RATE: 80 BPM | RESPIRATION RATE: 18 BRPM | BODY MASS INDEX: 21.47 KG/M2 | TEMPERATURE: 98 F | WEIGHT: 150 LBS | OXYGEN SATURATION: 98 % | DIASTOLIC BLOOD PRESSURE: 70 MMHG | SYSTOLIC BLOOD PRESSURE: 156 MMHG | HEIGHT: 70 IN

## 2019-01-25 DIAGNOSIS — S40.019A SHOULDER CONTUSION: Primary | ICD-10-CM

## 2019-01-25 LAB
ALBUMIN SERPL BCP-MCNC: 3.5 G/DL (ref 3.5–5.7)
ALP SERPL-CCNC: 54 U/L (ref 55–165)
ALT SERPL W P-5'-P-CCNC: 12 U/L (ref 7–52)
ANION GAP SERPL CALCULATED.3IONS-SCNC: 8 MMOL/L (ref 4–13)
AST SERPL W P-5'-P-CCNC: 23 U/L (ref 13–39)
ATRIAL RATE: 91 BPM
BASOPHILS # BLD AUTO: 0.1 THOUSANDS/ΜL (ref 0–0.1)
BASOPHILS NFR BLD AUTO: 1 % (ref 0–1)
BILIRUB SERPL-MCNC: 0.7 MG/DL (ref 0.2–1)
BUN SERPL-MCNC: 22 MG/DL (ref 7–25)
CALCIUM SERPL-MCNC: 8.7 MG/DL (ref 8.6–10.5)
CHLORIDE SERPL-SCNC: 99 MMOL/L (ref 98–107)
CO2 SERPL-SCNC: 35 MMOL/L (ref 21–31)
CREAT SERPL-MCNC: 1.12 MG/DL (ref 0.7–1.3)
EOSINOPHIL # BLD AUTO: 0 THOUSAND/ΜL (ref 0–0.61)
EOSINOPHIL NFR BLD AUTO: 0 % (ref 0–6)
ERYTHROCYTE [DISTWIDTH] IN BLOOD BY AUTOMATED COUNT: 15.8 % (ref 11.6–15.1)
GFR SERPL CREATININE-BSD FRML MDRD: 56 ML/MIN/1.73SQ M
GLUCOSE SERPL-MCNC: 173 MG/DL (ref 65–140)
HCT VFR BLD AUTO: 42 % (ref 42–52)
HGB BLD-MCNC: 13.4 G/DL (ref 12–17)
LYMPHOCYTES # BLD AUTO: 2.6 THOUSANDS/ΜL (ref 0.6–4.47)
LYMPHOCYTES NFR BLD AUTO: 25 % (ref 14–44)
MCH RBC QN AUTO: 26.7 PG (ref 26.8–34.3)
MCHC RBC AUTO-ENTMCNC: 31.8 G/DL (ref 31.4–37.4)
MCV RBC AUTO: 84 FL (ref 82–98)
MONOCYTES # BLD AUTO: 0.7 THOUSAND/ΜL (ref 0.17–1.22)
MONOCYTES NFR BLD AUTO: 6 % (ref 4–12)
NEUTROPHILS # BLD AUTO: 7.1 THOUSANDS/ΜL (ref 1.85–7.62)
NEUTS SEG NFR BLD AUTO: 68 % (ref 43–75)
NRBC BLD AUTO-RTO: 0 /100 WBCS
P AXIS: 89 DEGREES
PLATELET # BLD AUTO: 156 THOUSANDS/UL (ref 149–390)
PMV BLD AUTO: 9.6 FL (ref 8.9–12.7)
POTASSIUM SERPL-SCNC: 3.9 MMOL/L (ref 3.5–5.5)
PROT SERPL-MCNC: 6.4 G/DL (ref 6.4–8.9)
QRS AXIS: -86 DEGREES
QRSD INTERVAL: 128 MS
QT INTERVAL: 412 MS
QTC INTERVAL: 506 MS
RBC # BLD AUTO: 5.01 MILLION/UL (ref 3.88–5.62)
SODIUM SERPL-SCNC: 142 MMOL/L (ref 134–143)
T WAVE AXIS: 8 DEGREES
TROPONIN I SERPL-MCNC: <0.03 NG/ML
VENTRICULAR RATE: 91 BPM
WBC # BLD AUTO: 10.4 THOUSAND/UL (ref 4.31–10.16)

## 2019-01-25 PROCEDURE — 71046 X-RAY EXAM CHEST 2 VIEWS: CPT

## 2019-01-25 PROCEDURE — 84484 ASSAY OF TROPONIN QUANT: CPT | Performed by: EMERGENCY MEDICINE

## 2019-01-25 PROCEDURE — 93005 ELECTROCARDIOGRAM TRACING: CPT

## 2019-01-25 PROCEDURE — 80053 COMPREHEN METABOLIC PANEL: CPT | Performed by: EMERGENCY MEDICINE

## 2019-01-25 PROCEDURE — 93010 ELECTROCARDIOGRAM REPORT: CPT | Performed by: INTERNAL MEDICINE

## 2019-01-25 PROCEDURE — 73030 X-RAY EXAM OF SHOULDER: CPT

## 2019-01-25 PROCEDURE — 36415 COLL VENOUS BLD VENIPUNCTURE: CPT | Performed by: EMERGENCY MEDICINE

## 2019-01-25 PROCEDURE — 70450 CT HEAD/BRAIN W/O DYE: CPT

## 2019-01-25 PROCEDURE — 85025 COMPLETE CBC W/AUTO DIFF WBC: CPT | Performed by: EMERGENCY MEDICINE

## 2019-01-25 PROCEDURE — 99284 EMERGENCY DEPT VISIT MOD MDM: CPT

## 2019-01-25 PROCEDURE — 96361 HYDRATE IV INFUSION ADD-ON: CPT

## 2019-01-25 PROCEDURE — 96360 HYDRATION IV INFUSION INIT: CPT

## 2019-01-25 RX ORDER — 0.9 % SODIUM CHLORIDE 0.9 %
3 VIAL (ML) INJECTION AS NEEDED
Status: DISCONTINUED | OUTPATIENT
Start: 2019-01-25 | End: 2019-01-25 | Stop reason: HOSPADM

## 2019-01-25 RX ORDER — SODIUM CHLORIDE 9 MG/ML
125 INJECTION, SOLUTION INTRAVENOUS CONTINUOUS
Status: DISCONTINUED | OUTPATIENT
Start: 2019-01-25 | End: 2019-01-25 | Stop reason: HOSPADM

## 2019-01-25 RX ORDER — ACETAMINOPHEN AND CODEINE PHOSPHATE 300; 30 MG/1; MG/1
1-2 TABLET ORAL EVERY 6 HOURS PRN
Qty: 15 TABLET | Refills: 0 | Status: SHIPPED | OUTPATIENT
Start: 2019-01-25 | End: 2020-02-19 | Stop reason: HOSPADM

## 2019-01-25 RX ADMIN — SODIUM CHLORIDE: 0.9 INJECTION, SOLUTION INTRAVENOUS at 14:48

## 2019-01-25 RX ADMIN — SODIUM CHLORIDE 125 ML/HR: 0.9 INJECTION, SOLUTION INTRAVENOUS at 14:49

## 2019-01-25 NOTE — ED PROVIDER NOTES
History  Chief Complaint   Patient presents with    Fall     Patient is a 77-year-old male who lives at an assisted living facility  Patient awoke this morning to go to the bathroom and stated he felt dizzy  Patient states that when he took the next step he fell or injured his right shoulder  Patient denied any headache or loss of conscious denied any numbness or weakness  Patient denied any palpitations or chest pain  Patient only reports pain in his right shoulder  Patient reported the incident hours later to staff at the assisted living facility in a referred him for evaluation            Prior to Admission Medications   Prescriptions Last Dose Informant Patient Reported? Taking? COMBIVENT RESPIMAT inhaler   Yes No   Multiple Vitamin (MULTIVITAMIN) tablet   Yes No   Sig: Take 1 tablet by mouth daily   acetaminophen (TYLENOL) 325 mg tablet  Self Yes No   Sig: Take 650 mg by mouth every 6 (six) hours as needed for mild pain   budesonide-formoterol (SYMBICORT) 160-4 5 mcg/act inhaler   Yes No   Sig: Inhale 2 puffs 2 (two) times a day Rinse mouth after use     docusate sodium (COLACE) 100 mg capsule   No No   Sig: Take 1 capsule (100 mg total) by mouth 2 (two) times a day   furosemide (LASIX) 40 mg tablet   No No   Sig: Take 1 tablet (40 mg total) by mouth daily   meclizine (ANTIVERT) 25 mg tablet   Yes No   Sig: Take by mouth 3 (three) times a day as needed for dizziness   metoprolol tartrate (LOPRESSOR) 25 mg tablet   No No   Sig: Take 1 tablet (25 mg total) by mouth every 12 (twelve) hours for 30 days   oxybutynin (DITROPAN-XL) 10 MG 24 hr tablet   Yes No   Sig: Take 10 mg by mouth daily     potassium chloride (K-DUR,KLOR-CON) 10 mEq tablet   No No   Sig: Take 1 tablet (10 mEq total) by mouth daily for 30 doses   simvastatin (ZOCOR) 40 mg tablet   Yes No   Sig: Take 40 mg by mouth daily at bedtime      Facility-Administered Medications: None       Past Medical History:   Diagnosis Date    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     COPD (chronic obstructive pulmonary disease) [facility] [address] [facility]     Hypercholesteremia     Hyperlipidemia     [facility]     Phimosis     Stress incontinence     Urinary retention     Urinary urgency        Past Surgical History:   Procedure Laterality Date    BLADDER FULGURATION  2012, 2013   [place]  2013    PROSTATECTOMY  1995    TOTAL HIP ARTHROPLASTY Left 2012    URINARY SPHINCTER IMPLANT  1996    Repaired in 2002; Removed in 2012    UROFLOWMETRY SIMPLE / COMPLEX  1996       Family History   Problem Relation Age of Onset    Family history unknown: Yes     I have reviewed and agree with the history as documented  Social History   Substance Use Topics    Smoking status: Former Smoker     Packs/day: 0 50     Types: Cigarettes     Start date: 1947    Smokeless tobacco: Never Used    Alcohol use No        Review of Systems   Constitutional: Negative  HENT: Negative  Respiratory: Negative  Cardiovascular: Negative  Gastrointestinal: Negative  Genitourinary: Negative  Musculoskeletal: Positive for arthralgias  Negative for neck pain  Skin: Negative  Neurological: Positive for dizziness  Negative for weakness and headaches  Hematological: Negative  Physical Exam  Physical Exam   Constitutional: He is oriented to person, place, and time  He appears well-developed and well-nourished  HENT:   Head: Normocephalic and atraumatic  Eyes: Conjunctivae and EOM are normal    Neck: Normal range of motion  Neck supple  Nontender to palpation  No pain with axial loading   Cardiovascular: Normal rate  Irregular irregular rhythm without murmur   Pulmonary/Chest: Effort normal and breath sounds normal    Abdominal: Soft  There is no tenderness  Musculoskeletal: He exhibits tenderness  He exhibits no deformity  Tenderness to palpation of the right AC joint  Full range of motion passively of the right shoulder  Neurological: He is alert and oriented to person, place, and time  Skin: Skin is warm and dry  Nursing note and vitals reviewed  Vital Signs  ED Triage Vitals [01/25/19 1418]   Temperature Pulse Respirations Blood Pressure SpO2   97 6 °F (36 4 °C) 81 18 (!) 137/48 95 %      Temp Source Heart Rate Source Patient Position - Orthostatic VS BP Location FiO2 (%)   Tympanic Monitor Lying Left arm --      Pain Score       4           Vitals:    01/25/19 1418   BP: (!) 137/48   Pulse: 81   Patient Position - Orthostatic VS: Lying       Visual Acuity      ED Medications  Medications   sodium chloride (PF) 0 9 % injection 3 mL (not administered)   sodium chloride 0 9 % infusion (125 mL/hr Intravenous New Bag 1/25/19 1449)   sodium chloride 0 9 % bolus 500 mL ( Intravenous New Bag 1/25/19 1448)       Diagnostic Studies  Results Reviewed     Procedure Component Value Units Date/Time    Troponin I [[id number]]  (Normal) Collected:  01/25/19 1447    Lab Status:  Final result Specimen:  Blood from Arm, Right Updated:  01/25/19 1522     Troponin I <0 03 ng/mL     Comprehensive metabolic panel [[id number]]  (Abnormal) Collected:  01/25/19 1447    Lab Status:  Final result Specimen:  Blood from Arm, Right Updated:  01/25/19 1521     Sodium 142 mmol/L      Potassium 3 9 mmol/L      Chloride 99 mmol/L      CO2 35 (H) mmol/L      ANION GAP 8 mmol/L      BUN 22 mg/dL      Creatinine 1 12 mg/dL      Glucose 173 (H) mg/dL      Calcium 8 7 mg/dL      AST 23 U/L      ALT 12 U/L      Alkaline Phosphatase 54 (L) U/L      Total Protein 6 4 g/dL      Albumin 3 5 g/dL      Total Bilirubin 0 70 mg/dL      eGFR 56 ml/min/1 73sq m     Narrative:         National Kidney Disease Education Program recommendations are as follows:  GFR calculation is accurate only with a steady state creatinine  Chronic Kidney disease less than 60 ml/min/1 73 sq  meters  Kidney failure less than 15 ml/min/1 73 sq  meters      CBC and differential [[id number]] (Abnormal) Collected:  01/25/19 1447    Lab Status:  Final result Specimen:  Blood from Arm, Right Updated:  01/25/19 1456     WBC 10 40 (H) Thousand/uL      RBC 5 01 Million/uL      Hemoglobin 13 4 g/dL      Hematocrit 42 0 %      MCV 84 fL      MCH 26 7 (L) pg      MCHC 31 8 g/dL      RDW 15 8 (H) %      MPV 9 6 fL      Platelets 958 Thousands/uL      nRBC 0 /100 WBCs      Neutrophils Relative 68 %      Lymphocytes Relative 25 %      Monocytes Relative 6 %      Eosinophils Relative 0 %      Basophils Relative 1 %      Neutrophils Absolute 7 10 Thousands/µL      Lymphocytes Absolute 2 60 Thousands/µL      Monocytes Absolute 0 70 Thousand/µL      Eosinophils Absolute 0 00 Thousand/µL      Basophils Absolute 0 10 Thousands/µL                  CT head without contrast   Final Result by [place], MD (01/25 1552)      No acute intracranial abnormality  Workstation performed: [id number]         XR shoulder 2+ views RIGHT    (Results Pending)   X-ray chest 2 views    (Results Pending)              Procedures  Procedures       Phone Contacts  ED Phone Contact    ED Course                               MDM  Number of Diagnoses or Management Options  Diagnosis management comments: Patient ambulated emerged department without any difficulty  Patient with mild pain in his shoulder but good use of shoulder with the exception of abduction which causes pain       Amount and/or Complexity of Data Reviewed  Clinical lab tests: reviewed  Tests in the radiology section of CPT®: reviewed    Risk of Complications, Morbidity, and/or Mortality  Presenting problems: moderate  Diagnostic procedures: low    Patient Progress  Patient progress: stable    CritCare Time    Disposition  Final diagnoses:   None     ED Disposition     None      Follow-up Information    None         Patient's Medications   Discharge Prescriptions    No medications on file     No discharge procedures on file      ED Provider  Electronically Signed by           Khadar Reilly MD  01/25/19 6910

## 2019-01-31 ENCOUNTER — PATIENT OUTREACH (OUTPATIENT)
Dept: CASE MANAGEMENT | Facility: OTHER | Age: 84
End: 2019-01-31

## 2019-01-31 NOTE — PROGRESS NOTES
Per Brandy Clay, from [facility], patient is currently receiving skilled nursing services

## 2019-02-13 ENCOUNTER — PATIENT OUTREACH (OUTPATIENT)
Dept: CASE MANAGEMENT | Facility: OTHER | Age: 84
End: 2019-02-13

## 2019-02-19 ENCOUNTER — TELEPHONE (OUTPATIENT)
Dept: UROLOGY | Facility: MEDICAL CENTER | Age: 84
End: 2019-02-19

## 2019-04-18 ENCOUNTER — TELEPHONE (OUTPATIENT)
Dept: UROLOGY | Facility: AMBULATORY SURGERY CENTER | Age: 84
End: 2019-04-18

## 2019-06-18 ENCOUNTER — TELEPHONE (OUTPATIENT)
Dept: UROLOGY | Facility: AMBULATORY SURGERY CENTER | Age: 84
End: 2019-06-18

## 2019-07-22 DIAGNOSIS — Z85.46 HISTORY OF PROSTATE CANCER: Primary | ICD-10-CM

## 2019-07-30 ENCOUNTER — OFFICE VISIT (OUTPATIENT)
Dept: UROLOGY | Facility: MEDICAL CENTER | Age: 84
End: 2019-07-30
Payer: MEDICARE

## 2019-07-30 VITALS
HEIGHT: 70 IN | BODY MASS INDEX: 21.76 KG/M2 | DIASTOLIC BLOOD PRESSURE: 74 MMHG | HEART RATE: 50 BPM | WEIGHT: 152 LBS | SYSTOLIC BLOOD PRESSURE: 130 MMHG

## 2019-07-30 DIAGNOSIS — Z85.46 HISTORY OF PROSTATE CANCER: Primary | ICD-10-CM

## 2019-07-30 DIAGNOSIS — N39.3 URINARY INCONTINENCE, MALE, STRESS: ICD-10-CM

## 2019-07-30 PROCEDURE — 99213 OFFICE O/P EST LOW 20 MIN: CPT | Performed by: UROLOGY

## 2019-07-30 NOTE — PROGRESS NOTES
Assessment/Plan:   1  Stress urinary incontinence status post radical prostatectomy many years ago for adenocarcinoma of the prostate  The patient for many years and and artificial urinary sphincter that worked quite well  Unfortunately the patient ultimately eroded into the urethra requiring removal of the artificial urinary sphincter initially with placement of a urethral Acevedo and eventually conversion of the urethral Acevedo to a suprapubic tube  The patient currently is having the suprapubic tube changed by the visiting nurse every 2 weeks and is pleased with the results of that care plan      2  History of prostate cancer  The patient has no evidence of disease recurrence       Diagnoses and all orders for this visit:    History of prostate cancer    Urinary incontinence, male, stress    Other orders  -     apixaban (ELIQUIS) 5 mg; Take 5 mg by mouth 2 (two) times a day          Subjective:     Patient ID: Juana Patton is a 80 y o  male  HPI    Review of Systems   Constitutional: Positive for activity change and fatigue  Respiratory: Negative  Cardiovascular: Negative  Gastrointestinal: Negative  Genitourinary:        SP tube draining well holding all urinary incontinence   Musculoskeletal: Positive for arthralgias and myalgias  Hematological:        Recent DVT lower extremity-on Eliquis   Psychiatric/Behavioral: Negative  Objective:     Physical Exam   Constitutional: He is oriented to person, place, and time  He appears well-nourished  No distress  HENT:   Head: Atraumatic  Neck: Neck supple  Pulmonary/Chest: Effort normal  No respiratory distress  Abdominal: Soft  SP tube site clean   Neurological: He is alert and oriented to person, place, and time  Psychiatric: He has a normal mood and affect  His behavior is normal  Judgment and thought content normal    Vitals reviewed

## 2019-10-21 ENCOUNTER — TELEPHONE (OUTPATIENT)
Dept: UROLOGY | Facility: MEDICAL CENTER | Age: 84
End: 2019-10-21

## 2019-10-21 NOTE — TELEPHONE ENCOUNTER
Patient of Dr Clifford Vasquez from [facility] called to advise that the patient "had his recertification visit today "  She does not need a return call

## 2019-11-12 ENCOUNTER — TELEPHONE (OUTPATIENT)
Dept: UROLOGY | Facility: MEDICAL CENTER | Age: 84
End: 2019-11-12

## 2019-11-12 DIAGNOSIS — N32.89 BLADDER SPASMS: Primary | ICD-10-CM

## 2019-11-12 NOTE — TELEPHONE ENCOUNTER
Patient of Dr Juliana Wagoner seen in [facility]  Nurse, Christina from [facility][1] calling to advise of redness and drainage at SPT site      She can be reached at [phone]

## 2019-11-13 NOTE — TELEPHONE ENCOUNTER
Christina from [facility] home health calling back missed a call from our office  Please call back    As I was calling over to the [place] office we got disconnected

## 2019-11-15 NOTE — TELEPHONE ENCOUNTER
Called Christina from [facility]- patient having some redness and drainage from suprapubic tube site  She is told that the patient can use neosporin for the redness  She was told that I would check to see if there is anything to do with the drainage    Please advise

## 2019-11-18 RX ORDER — OXYBUTYNIN CHLORIDE 15 MG/1
15 TABLET, EXTENDED RELEASE ORAL
Qty: 90 TABLET | Refills: 3 | Status: SHIPPED | OUTPATIENT
Start: 2019-11-18

## 2019-11-18 NOTE — TELEPHONE ENCOUNTER
We can attempt to use an increased dose of oxybutynin to 15 mg p o  Daily to see if that will assist in decreasing drainage around the SP tube  I would advise to keep the area clean and dry in use a topical dressing if needed to assist in collection of some of this fluid

## 2019-12-03 ENCOUNTER — TELEPHONE (OUTPATIENT)
Dept: UROLOGY | Facility: MEDICAL CENTER | Age: 84
End: 2019-12-03

## 2019-12-03 NOTE — TELEPHONE ENCOUNTER
Patient of Dr Jaci Vasquez from [facility] called  She would like to speak to clinical about patient's SPT  She can be reached at [phone]

## 2019-12-10 ENCOUNTER — TELEPHONE (OUTPATIENT)
Dept: UROLOGY | Facility: MEDICAL CENTER | Age: 84
End: 2019-12-10

## 2019-12-11 NOTE — TELEPHONE ENCOUNTER
The redness may be due top the irritation of the drainage  I would make sure to keep the area clean and dry  Place a split dressing at the tube insertion site   If redness or drainage increases, he will need to be evaluated

## 2019-12-18 NOTE — TELEPHONE ENCOUNTER
Christina THOMAS called and advised that the patient is still having redness and drainage  She would like to know what she should do  She advised that the patient vitals are normal and his urine is clear yellow  Please advise

## 2019-12-18 NOTE — TELEPHONE ENCOUNTER
Call received back from Christina THOMAS and advised that patient was continuing to have serosangineous drainage around the SPT site and that site was red and irritated  MARTHA wanted to make Urology department aware and see if there were any further recommendations at this time  Will route to provider for further recommendations

## 2019-12-18 NOTE — TELEPHONE ENCOUNTER
Agree with [facility] Payer recommendations, as noted in previous encounter  However, since [facility] has called in the past regarding this concern, patient can be brought in non urgently for evaluation

## 2019-12-18 NOTE — TELEPHONE ENCOUNTER
Call placed to Christina at [facility]  Message left with Christina to call the office back to discuss concerns  Office number provided

## 2019-12-19 NOTE — TELEPHONE ENCOUNTER
Called Christina to inform her that the pt should call to be seen earlier if he is concerned about the drainage  She sees the pt weekly and will relay the message  I also left at detailed message on pt's VM to call our office to make a sooner appt if he wants  Office number given

## 2020-01-14 ENCOUNTER — TELEPHONE (OUTPATIENT)
Dept: UROLOGY | Facility: MEDICAL CENTER | Age: 85
End: 2020-01-14

## 2020-01-14 DIAGNOSIS — T14.8XXA SKIN EXCORIATION: Primary | ICD-10-CM

## 2020-01-14 RX ORDER — BACITRACIN 500 [USP'U]/G
OINTMENT TOPICAL AS NEEDED
Qty: 56.7 G | Refills: 0 | Status: SHIPPED | OUTPATIENT
Start: 2020-01-14

## 2020-01-14 NOTE — TELEPHONE ENCOUNTER
Call placed to patient and spoke with patient's son as per signed communication consent form and informed him that prescription that was requested was sent to the Pharmacy on file  Son aware and will relay information to visiting nurse

## 2020-01-14 NOTE — TELEPHONE ENCOUNTER
Wayne Woo from [address] [place] [address] called to report pt has tan drainage around s/p tube insertion site  Telephone order given to culture the drainage  Excroriation noted at the site  Asking CRNP to order Zinc Oxide to apply to site after washing and drying well  Urine clear  Pt afebrile  S/P tube change today in his home  Awaiting drainage culture results

## 2020-02-16 ENCOUNTER — HOSPITAL ENCOUNTER (INPATIENT)
Facility: HOSPITAL | Age: 85
LOS: 2 days | Discharge: HOME HEALTH | DRG: 391 | End: 2020-02-19
Attending: EMERGENCY MEDICINE | Admitting: INTERNAL MEDICINE
Payer: MEDICARE

## 2020-02-16 ENCOUNTER — APPOINTMENT (EMERGENCY)
Dept: CT IMAGING | Facility: HOSPITAL | Age: 85
DRG: 391 | End: 2020-02-16
Payer: MEDICARE

## 2020-02-16 DIAGNOSIS — R06.02 SOB (SHORTNESS OF BREATH): ICD-10-CM

## 2020-02-16 DIAGNOSIS — R19.7 DIARRHEA: Primary | ICD-10-CM

## 2020-02-16 DIAGNOSIS — I48.91 ATRIAL FIBRILLATION (HCC): ICD-10-CM

## 2020-02-16 DIAGNOSIS — H81.03 MENIERE'S DISEASE OF BOTH EARS: ICD-10-CM

## 2020-02-16 DIAGNOSIS — J44.9 COPD (CHRONIC OBSTRUCTIVE PULMONARY DISEASE) (HCC): ICD-10-CM

## 2020-02-16 DIAGNOSIS — I50.22 CHRONIC SYSTOLIC CHF (CONGESTIVE HEART FAILURE) (HCC): ICD-10-CM

## 2020-02-16 DIAGNOSIS — N17.9 AKI (ACUTE KIDNEY INJURY) (HCC): ICD-10-CM

## 2020-02-16 PROBLEM — I47.1 MULTIFOCAL ATRIAL TACHYCARDIA DETERMINED BY ELECTROCARDIOGRAPHY (HCC): Status: ACTIVE | Noted: 2019-08-20

## 2020-02-16 PROBLEM — I35.0 AORTIC STENOSIS: Status: ACTIVE | Noted: 2019-03-13

## 2020-02-16 PROBLEM — I82.409 DVT (DEEP VENOUS THROMBOSIS) (HCC): Status: ACTIVE | Noted: 2019-03-21

## 2020-02-16 LAB
ALBUMIN SERPL BCP-MCNC: 3.7 G/DL (ref 3.5–5.7)
ALP SERPL-CCNC: 57 U/L (ref 55–165)
ALT SERPL W P-5'-P-CCNC: 15 U/L (ref 7–52)
ANION GAP SERPL CALCULATED.3IONS-SCNC: 13 MMOL/L (ref 4–13)
AST SERPL W P-5'-P-CCNC: 31 U/L (ref 13–39)
BASOPHILS # BLD AUTO: 0.1 THOUSANDS/ΜL (ref 0–0.1)
BASOPHILS NFR BLD AUTO: 1 % (ref 0–2)
BILIRUB SERPL-MCNC: 1.3 MG/DL (ref 0.2–1)
BUN SERPL-MCNC: 24 MG/DL (ref 7–25)
CALCIUM SERPL-MCNC: 8.9 MG/DL (ref 8.6–10.5)
CHLORIDE SERPL-SCNC: 101 MMOL/L (ref 98–107)
CO2 SERPL-SCNC: 27 MMOL/L (ref 21–31)
CREAT SERPL-MCNC: 1.57 MG/DL (ref 0.7–1.3)
EOSINOPHIL # BLD AUTO: 0.1 THOUSAND/ΜL (ref 0–0.61)
EOSINOPHIL NFR BLD AUTO: 1 % (ref 0–5)
ERYTHROCYTE [DISTWIDTH] IN BLOOD BY AUTOMATED COUNT: 16.2 % (ref 11.5–14.5)
GFR SERPL CREATININE-BSD FRML MDRD: 37 ML/MIN/1.73SQ M
GLUCOSE SERPL-MCNC: 116 MG/DL (ref 65–99)
HCT VFR BLD AUTO: 46.3 % (ref 42–47)
HGB BLD-MCNC: 14.5 G/DL (ref 14–18)
HOLD SPECIMEN: NORMAL
LYMPHOCYTES # BLD AUTO: 3.7 THOUSANDS/ΜL (ref 0.6–4.47)
LYMPHOCYTES NFR BLD AUTO: 35 % (ref 21–51)
MCH RBC QN AUTO: 26.6 PG (ref 26–34)
MCHC RBC AUTO-ENTMCNC: 31.3 G/DL (ref 31–37)
MCV RBC AUTO: 85 FL (ref 81–99)
MONOCYTES # BLD AUTO: 0.9 THOUSAND/ΜL (ref 0.17–1.22)
MONOCYTES NFR BLD AUTO: 9 % (ref 2–12)
NEUTROPHILS # BLD AUTO: 5.7 THOUSANDS/ΜL (ref 1.4–6.5)
NEUTS SEG NFR BLD AUTO: 55 % (ref 42–75)
PLATELET # BLD AUTO: 173 THOUSANDS/UL (ref 149–390)
PMV BLD AUTO: 9.9 FL (ref 8.6–11.7)
POTASSIUM SERPL-SCNC: 4.6 MMOL/L (ref 3.5–5.5)
PROT SERPL-MCNC: 7.1 G/DL (ref 6.4–8.9)
RBC # BLD AUTO: 5.45 MILLION/UL (ref 4.3–5.9)
SODIUM SERPL-SCNC: 141 MMOL/L (ref 134–143)
WBC # BLD AUTO: 10.5 THOUSAND/UL (ref 4.8–10.8)

## 2020-02-16 PROCEDURE — 99220 PR INITIAL OBSERVATION CARE/DAY 70 MINUTES: CPT | Performed by: PHYSICIAN ASSISTANT

## 2020-02-16 PROCEDURE — 85025 COMPLETE CBC W/AUTO DIFF WBC: CPT | Performed by: EMERGENCY MEDICINE

## 2020-02-16 PROCEDURE — 80053 COMPREHEN METABOLIC PANEL: CPT | Performed by: EMERGENCY MEDICINE

## 2020-02-16 PROCEDURE — 36415 COLL VENOUS BLD VENIPUNCTURE: CPT | Performed by: EMERGENCY MEDICINE

## 2020-02-16 PROCEDURE — 99285 EMERGENCY DEPT VISIT HI MDM: CPT

## 2020-02-16 PROCEDURE — 74176 CT ABD & PELVIS W/O CONTRAST: CPT

## 2020-02-16 PROCEDURE — 99283 EMERGENCY DEPT VISIT LOW MDM: CPT | Performed by: EMERGENCY MEDICINE

## 2020-02-16 PROCEDURE — 96360 HYDRATION IV INFUSION INIT: CPT

## 2020-02-16 PROCEDURE — 96361 HYDRATE IV INFUSION ADD-ON: CPT

## 2020-02-16 RX ORDER — ACETAMINOPHEN 325 MG/1
650 TABLET ORAL EVERY 6 HOURS PRN
Status: DISCONTINUED | OUTPATIENT
Start: 2020-02-16 | End: 2020-02-19 | Stop reason: HOSPADM

## 2020-02-16 RX ORDER — IPRATROPIUM BROMIDE AND ALBUTEROL SULFATE 2.5; .5 MG/3ML; MG/3ML
3 SOLUTION RESPIRATORY (INHALATION)
Status: DISCONTINUED | OUTPATIENT
Start: 2020-02-16 | End: 2020-02-17

## 2020-02-16 RX ORDER — OXYBUTYNIN CHLORIDE 5 MG/1
15 TABLET, EXTENDED RELEASE ORAL
Status: DISCONTINUED | OUTPATIENT
Start: 2020-02-16 | End: 2020-02-19 | Stop reason: HOSPADM

## 2020-02-16 RX ORDER — ONDANSETRON 2 MG/ML
4 INJECTION INTRAMUSCULAR; INTRAVENOUS EVERY 6 HOURS PRN
Status: DISCONTINUED | OUTPATIENT
Start: 2020-02-16 | End: 2020-02-19 | Stop reason: HOSPADM

## 2020-02-16 RX ORDER — PRAVASTATIN SODIUM 40 MG
80 TABLET ORAL
Status: DISCONTINUED | OUTPATIENT
Start: 2020-02-17 | End: 2020-02-19 | Stop reason: HOSPADM

## 2020-02-16 RX ORDER — LOPERAMIDE HYDROCHLORIDE 2 MG/1
2 CAPSULE ORAL 4 TIMES DAILY PRN
Status: ON HOLD | COMMUNITY
End: 2020-02-19 | Stop reason: SDUPTHER

## 2020-02-16 RX ORDER — BUDESONIDE AND FORMOTEROL FUMARATE DIHYDRATE 160; 4.5 UG/1; UG/1
2 AEROSOL RESPIRATORY (INHALATION) 2 TIMES DAILY
Status: DISCONTINUED | OUTPATIENT
Start: 2020-02-16 | End: 2020-02-19 | Stop reason: HOSPADM

## 2020-02-16 RX ORDER — SODIUM CHLORIDE 9 MG/ML
50 INJECTION, SOLUTION INTRAVENOUS CONTINUOUS
Status: DISCONTINUED | OUTPATIENT
Start: 2020-02-16 | End: 2020-02-18

## 2020-02-16 RX ADMIN — OXYBUTYNIN CHLORIDE 15 MG: 5 TABLET, EXTENDED RELEASE ORAL at 22:44

## 2020-02-16 RX ADMIN — SODIUM CHLORIDE 100 ML/HR: 0.9 INJECTION, SOLUTION INTRAVENOUS at 20:58

## 2020-02-16 RX ADMIN — BUDESONIDE AND FORMOTEROL FUMARATE DIHYDRATE 2 PUFF: 160; 4.5 AEROSOL RESPIRATORY (INHALATION) at 22:44

## 2020-02-16 RX ADMIN — SODIUM CHLORIDE 500 ML: 0.9 INJECTION, SOLUTION INTRAVENOUS at 17:58

## 2020-02-16 RX ADMIN — APIXABAN 5 MG: 5 TABLET, FILM COATED ORAL at 22:44

## 2020-02-16 NOTE — ED PROVIDER NOTES
History  Chief Complaint   Patient presents with    Diarrhea     x4 days, (-) abd pain, lives at [facility]     With diarrhea for about 4 days, no fever, no abd pain  States he has had no uti sx, no vomiting  Able to tolerate po at home  Prior to Admission Medications   Prescriptions Last Dose Informant Patient Reported? Taking? COMBIVENT RESPIMAT inhaler  Outside Facility (Specify) Yes No   Multiple Vitamin (MULTIVITAMIN) tablet  Outside Facility (Specify) Yes No   Sig: Take 1 tablet by mouth daily   acetaminophen (TYLENOL) 325 mg tablet  Outside Facility (Specify) Yes No   Sig: Take 650 mg by mouth every 6 (six) hours as needed for mild pain   acetaminophen-codeine (TYLENOL #3) 300-30 mg per tablet  Outside Facility (Specify) No No   Sig: Take 1-2 tablets by mouth every 6 (six) hours as needed for moderate pain for up to 15 doses   Patient not taking: Reported on 7/30/2019   apixaban (ELIQUIS) 5 mg  Outside Facility (Specify) Yes No   Sig: Take 5 mg by mouth 2 (two) times a day   budesonide-formoterol (SYMBICORT) 160-4 5 mcg/act inhaler  Outside Facility (Specify) Yes No   Sig: Inhale 2 puffs 2 (two) times a day Rinse mouth after use     docusate sodium (COLACE) 100 mg capsule  Outside Facility (Specify) No No   Sig: Take 1 capsule (100 mg total) by mouth 2 (two) times a day   Patient not taking: Reported on 7/30/2019   furosemide (LASIX) 40 mg tablet  Outside Facility (Specify) No No   Sig: Take 1 tablet (40 mg total) by mouth daily   meclizine (ANTIVERT) 25 mg tablet  Outside Facility (Specify) Yes No   Sig: Take by mouth 3 (three) times a day as needed for dizziness   metoprolol tartrate (LOPRESSOR) 25 mg tablet  Outside Facility (Specify) No No   Sig: Take 1 tablet (25 mg total) by mouth every 12 (twelve) hours for 30 days   oxybutynin (DITROPAN XL) 15 MG 24 hr tablet   No No   Sig: Take 1 tablet (15 mg total) by mouth daily at bedtime   potassium chloride (K-DUR,KLOR-CON) 10 mEq tablet  Outside Facility (Specify) No No   Sig: Take 1 tablet (10 mEq total) by mouth daily for 30 doses   simvastatin (ZOCOR) 40 mg tablet  Outside Facility (Specify) Yes No   Sig: Take 40 mg by mouth daily at bedtime   zinc oxide 20 % ointment   No No   Sig: Apply topically as needed for irritation Apply ice excoriated area around SP tube insertion site  Patient may self administer      Facility-Administered Medications: None       Past Medical History:   Diagnosis Date    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     COPD [facility]chronic obstructive [facility] disease) ([facility] )     Hypercholesteremia     Hyperlipidemia     Malignant neoplasm prostate ([facility] )     Phimosis     Stress incontinence     Urinary retention     Urinary urgency        Past Surgical History:   Procedure Laterality Date    BLADDER FULGURATION  2012, 2013    CIRCUMCISION, [place]  2013    PROSTATECTOMY  1995    TOTAL HIP ARTHROPLASTY Left 2012    URINARY SPHINCTER IMPLANT  1996    Repaired in 2002; Removed in 2012    UROFLOWMETRY SIMPLE / COMPLEX  1996       Family History   Family history unknown: Yes     I have reviewed and agree with the history as documented  Social History     Tobacco Use    Smoking status: Former Smoker     Packs/day: 0 50     Types: Cigarettes     Start date: 1947    Smokeless tobacco: Never Used   Substance Use Topics    Alcohol use: No    Drug use: No       Review of Systems   All other systems reviewed and are negative  Physical Exam  Physical Exam   Constitutional: He is oriented to person, place, and time  No distress  HENT:   Head: Normocephalic and atraumatic  Right Ear: External ear normal    Left Ear: External ear normal    Nose: Nose normal    Mouth/Throat: Oropharynx is clear and moist  No oropharyngeal exudate  Eyes: Pupils are equal, round, and reactive to light  Conjunctivae and EOM are normal  Right eye exhibits no discharge  Left eye exhibits no discharge  Neck: Normal range of motion   Neck supple  No JVD present  No tracheal deviation present  Cardiovascular: Normal heart sounds and intact distal pulses  Exam reveals no gallop and no friction rub  No murmur heard  Pulmonary/Chest: No stridor  No respiratory distress  He has no wheezes  He has no rales  He exhibits no tenderness  Abdominal: Soft  He exhibits no distension and no mass  There is no tenderness  There is no rebound and no guarding  No hernia  Hyperactive     Musculoskeletal: Normal range of motion  He exhibits no edema, tenderness or deformity  Neurological: He is alert and oriented to person, place, and time  He displays normal reflexes  No sensory deficit  He exhibits normal muscle tone  Skin: Skin is warm and dry  Capillary refill takes less than 2 seconds  No rash noted  He is not diaphoretic  No pallor  Psychiatric: He has a normal mood and affect         Vital Signs  ED Triage Vitals [02/16/20 1709]   Temperature Pulse Respirations Blood Pressure SpO2   98 1 °F (36 7 °C) 95 16 135/70 92 %      Temp Source Heart Rate Source Patient Position - Orthostatic VS BP Location FiO2 (%)   Temporal Monitor Lying Left arm --      Pain Score       --           Vitals:    02/16/20 1709 02/16/20 1801   BP: 135/70 155/67   Pulse: 95 92   Patient Position - Orthostatic VS: Lying Lying         Visual Acuity      ED Medications  Medications   sodium chloride 0 9 % bolus 500 mL (500 mL Intravenous New Bag 2/16/20 1758)       Diagnostic Studies  Results Reviewed     Procedure Component Value Units Date/Time    Comprehensive metabolic panel [[id number]]  (Abnormal) Collected:  02/16/20 1723    Lab Status:  Final result Specimen:  Blood from Arm, Right Updated:  02/16/20 1754     Sodium 141 mmol/L      Potassium 4 6 mmol/L      Chloride 101 mmol/L      CO2 27 mmol/L      ANION GAP 13 mmol/L      BUN 24 mg/dL      Creatinine 1 57 mg/dL      Glucose 116 mg/dL      Calcium 8 9 mg/dL      AST 31 U/L      ALT 15 U/L      Alkaline Phosphatase 57 U/L Total Protein 7 1 g/dL      Albumin 3 7 g/dL      Total Bilirubin 1 30 mg/dL      eGFR 37 ml/min/1 73sq m     Narrative:       [place] guidelines for Chronic Kidney Disease (CKD):     Stage 1 with normal or high GFR (GFR > 90 mL/min/1 73 square meters)    Stage 2 Mild CKD (GFR = 60-89 mL/min/1 73 square meters)    Stage 3A Moderate CKD (GFR = 45-59 mL/min/1 73 square meters)    Stage 3B Moderate CKD (GFR = 30-44 mL/min/1 73 square meters)    Stage 4 Severe CKD (GFR = 15-29 mL/min/1 73 square meters)    Stage 5 End Stage CKD (GFR <15 mL/min/1 73 square meters)  Note: GFR calculation is accurate only with a steady state creatinine    Clostridium difficile toxin by PCR with EIA [[id number]]     Lab Status:  No result Specimen:  Stool     Timbo draw [[id number]] Collected:  02/16/20 1722    Lab Status:  Final result Specimen:  Blood from Arm, Right Updated:  02/16/20 1732    Narrative: The following orders were created for panel order Timbo draw  Procedure                               Abnormality         Status                     ---------                               -----------         ------                     [place]isel [place] Top on PMTG[[id number]]                           Final result               Gold top on OJGD[[id number]]                                 Final result               Green / Yellow tube on JTYJ[[id number]]                      Final result               Green / Black tube on WCHS[[id number]]                       Final result               Lavender Top 3 ml on TXKY[[id number]]                        Final result                 Please view results for these tests on the individual orders      CBC and differential [[id number]]  (Abnormal) Collected:  02/16/20 1723    Lab Status:  Final result Specimen:  Blood from Arm, Right Updated:  02/16/20 1730     WBC 10 50 Thousand/uL      RBC 5 45 Million/uL      Hemoglobin 14 5 g/dL      Hematocrit 46 3 %      MCV 85 fL      MCH 26 6 pg      MCHC 31 3 g/dL      RDW 16 2 %      MPV 9 9 fL      Platelets 064 Thousands/uL      Neutrophils Relative 55 %      Lymphocytes Relative 35 %      Monocytes Relative 9 %      Eosinophils Relative 1 %      Basophils Relative 1 %      Neutrophils Absolute 5 70 Thousands/µL      Lymphocytes Absolute 3 70 Thousands/µL      Monocytes Absolute 0 90 Thousand/µL      Eosinophils Absolute 0 10 Thousand/µL      Basophils Absolute 0 10 Thousands/µL                  CT abdomen pelvis wo contrast   Final Result by Tiara Giraldo MD ([id number])      No definitive acute abnormality in the abdomen or pelvis  Colonic diverticulosis without evidence of diverticulitis, noting that a short segment of the sigmoid colon is obscured by intense streak artifact from left hip arthroplasty  Watery stool in the colon is consistent with diarrheal state  CT might not be sensitive for low-grade enterocolitis  4 0 cm infrarenal abdominal aortic aneurysm  Additional chronic findings as described  Workstation performed: [id number]                    Procedures  Procedures         ED Course                               MDM  Number of Diagnoses or Management Options  ELEAZAR (acute kidney injury) [facility]):   Diarrhea:   Diagnosis management comments: With diarhea for the last 4 days  No abd pain  In er ct negative acute abd  I have given 500 cc ivf  He appears dry on exam  gfr /cr noted  I have spoken to jenna who has accepted for dr Maurilio Bell for ivf hydration and jalyn  Disposition  Final diagnoses:   None     ED Disposition     None      Follow-up Information    None         Patient's Medications   Discharge Prescriptions    No medications on file     No discharge procedures on file      PDMP Review     None          ED Provider  Electronically Signed by           Jewel Adhikari MD  02/18/20 8909

## 2020-02-17 ENCOUNTER — APPOINTMENT (INPATIENT)
Dept: ULTRASOUND IMAGING | Facility: HOSPITAL | Age: 85
DRG: 391 | End: 2020-02-17
Payer: MEDICARE

## 2020-02-17 PROBLEM — J44.9 COPD (CHRONIC OBSTRUCTIVE PULMONARY DISEASE) (HCC): Status: ACTIVE | Noted: 2018-11-06

## 2020-02-17 PROBLEM — L89.229: Status: ACTIVE | Noted: 2020-02-17

## 2020-02-17 PROBLEM — Z86.718 HISTORY OF DVT (DEEP VEIN THROMBOSIS): Status: ACTIVE | Noted: 2019-03-21

## 2020-02-17 LAB
ANION GAP SERPL CALCULATED.3IONS-SCNC: 11 MMOL/L (ref 4–13)
BUN SERPL-MCNC: 19 MG/DL (ref 7–25)
CALCIUM SERPL-MCNC: 7.9 MG/DL (ref 8.6–10.5)
CHLORIDE SERPL-SCNC: 107 MMOL/L (ref 98–107)
CO2 SERPL-SCNC: 25 MMOL/L (ref 21–31)
CREAT SERPL-MCNC: 1.12 MG/DL (ref 0.7–1.3)
CREAT UR-MCNC: 173 MG/DL
ERYTHROCYTE [DISTWIDTH] IN BLOOD BY AUTOMATED COUNT: 15.6 % (ref 11.5–14.5)
GFR SERPL CREATININE-BSD FRML MDRD: 56 ML/MIN/1.73SQ M
GLUCOSE SERPL-MCNC: 66 MG/DL (ref 65–99)
HCT VFR BLD AUTO: 40.6 % (ref 42–47)
HGB BLD-MCNC: 12.7 G/DL (ref 14–18)
MCH RBC QN AUTO: 26.4 PG (ref 26–34)
MCHC RBC AUTO-ENTMCNC: 31.2 G/DL (ref 31–37)
MCV RBC AUTO: 85 FL (ref 81–99)
MICROALBUMIN UR-MCNC: 148 MG/L (ref 0–20)
MICROALBUMIN/CREAT 24H UR: 86 MG/G CREATININE (ref 0–30)
PLATELET # BLD AUTO: 148 THOUSANDS/UL (ref 149–390)
PMV BLD AUTO: 10 FL (ref 8.6–11.7)
POTASSIUM SERPL-SCNC: 3.7 MMOL/L (ref 3.5–5.5)
PROT UR-MCNC: 68 MG/DL
PROT/CREAT UR: 0.39 MG/G{CREAT} (ref 0–0.1)
RBC # BLD AUTO: 4.8 MILLION/UL (ref 4.3–5.9)
SODIUM 24H UR-SCNC: 58 MOL/L
SODIUM SERPL-SCNC: 143 MMOL/L (ref 134–143)
UUN 24H UR-MCNC: 1187 MG/DL
WBC # BLD AUTO: 8.6 THOUSAND/UL (ref 4.8–10.8)

## 2020-02-17 PROCEDURE — 82570 ASSAY OF URINE CREATININE: CPT | Performed by: INTERNAL MEDICINE

## 2020-02-17 PROCEDURE — 76770 US EXAM ABDO BACK WALL COMP: CPT

## 2020-02-17 PROCEDURE — 97163 PT EVAL HIGH COMPLEX 45 MIN: CPT

## 2020-02-17 PROCEDURE — 84156 ASSAY OF PROTEIN URINE: CPT | Performed by: INTERNAL MEDICINE

## 2020-02-17 PROCEDURE — 80048 BASIC METABOLIC PNL TOTAL CA: CPT | Performed by: PHYSICIAN ASSISTANT

## 2020-02-17 PROCEDURE — 84300 ASSAY OF URINE SODIUM: CPT | Performed by: INTERNAL MEDICINE

## 2020-02-17 PROCEDURE — 85027 COMPLETE CBC AUTOMATED: CPT | Performed by: PHYSICIAN ASSISTANT

## 2020-02-17 PROCEDURE — 99233 SBSQ HOSP IP/OBS HIGH 50: CPT | Performed by: NURSE PRACTITIONER

## 2020-02-17 PROCEDURE — 99223 1ST HOSP IP/OBS HIGH 75: CPT | Performed by: INTERNAL MEDICINE

## 2020-02-17 PROCEDURE — 82043 UR ALBUMIN QUANTITATIVE: CPT | Performed by: INTERNAL MEDICINE

## 2020-02-17 PROCEDURE — 97167 OT EVAL HIGH COMPLEX 60 MIN: CPT

## 2020-02-17 PROCEDURE — 84540 ASSAY OF URINE/UREA-N: CPT | Performed by: INTERNAL MEDICINE

## 2020-02-17 RX ORDER — IPRATROPIUM BROMIDE AND ALBUTEROL SULFATE 2.5; .5 MG/3ML; MG/3ML
3 SOLUTION RESPIRATORY (INHALATION) EVERY 6 HOURS PRN
Status: DISCONTINUED | OUTPATIENT
Start: 2020-02-17 | End: 2020-02-19 | Stop reason: HOSPADM

## 2020-02-17 RX ADMIN — PRAVASTATIN SODIUM 80 MG: 40 TABLET ORAL at 15:47

## 2020-02-17 RX ADMIN — BUDESONIDE AND FORMOTEROL FUMARATE DIHYDRATE 2 PUFF: 160; 4.5 AEROSOL RESPIRATORY (INHALATION) at 08:58

## 2020-02-17 RX ADMIN — BUDESONIDE AND FORMOTEROL FUMARATE DIHYDRATE 2 PUFF: 160; 4.5 AEROSOL RESPIRATORY (INHALATION) at 18:24

## 2020-02-17 RX ADMIN — APIXABAN 5 MG: 5 TABLET, FILM COATED ORAL at 18:24

## 2020-02-17 RX ADMIN — APIXABAN 5 MG: 5 TABLET, FILM COATED ORAL at 08:50

## 2020-02-17 RX ADMIN — OXYBUTYNIN CHLORIDE 15 MG: 5 TABLET, EXTENDED RELEASE ORAL at 22:14

## 2020-02-17 NOTE — PLAN OF CARE
Problem: Potential for Falls  Goal: Patient will remain free of falls  Description  INTERVENTIONS:  - Assess patient frequently for physical needs  -  Identify cognitive and physical deficits and behaviors that affect risk of falls    -  Payne fall precautions as indicated by assessment   - Educate patient/family on patient safety including physical limitations  - Instruct patient to call for assistance with activity based on assessment  - Modify environment to reduce risk of injury  - Consider OT/PT consult to assist with strengthening/mobility  Outcome: Progressing     Problem: PAIN - ADULT  Goal: Verbalizes/displays adequate comfort level or baseline comfort level  Description  Interventions:  - Encourage patient to monitor pain and request assistance  - Assess pain using appropriate pain scale  - Administer analgesics based on type and severity of pain and evaluate response  - Implement non-pharmacological measures as appropriate and evaluate response  - Consider cultural and social influences on pain and pain management  - Notify physician/advanced practitioner if interventions unsuccessful or patient reports new pain  Outcome: Progressing     Problem: INFECTION - ADULT  Goal: Absence or prevention of progression during hospitalization  Description  INTERVENTIONS:  - Assess and monitor for signs and symptoms of infection  - Monitor lab/diagnostic results  - Monitor all insertion sites, i e  indwelling lines, tubes, and drains  - Monitor endotracheal if appropriate and nasal secretions for changes in amount and color  - Payne appropriate cooling/warming therapies per order  - Administer medications as ordered  - Instruct and encourage patient and family to use good hand hygiene technique  - Identify and instruct in appropriate isolation precautions for identified infection/condition  Outcome: Progressing  Goal: Absence of fever/infection during neutropenic period  Description  INTERVENTIONS:  - Monitor WBC    Outcome: Progressing     Problem: SAFETY ADULT  Goal: Maintain or return to baseline ADL function  Description  INTERVENTIONS:  -  Assess patient's ability to carry out ADLs; assess patient's baseline for ADL function and identify physical deficits which impact ability to perform ADLs (bathing, care of mouth/teeth, toileting, grooming, dressing, etc )  - Assess/evaluate cause of self-care deficits   - Assess range of motion  - Assess patient's mobility; develop plan if impaired  - Assess patient's need for assistive devices and provide as appropriate  - Encourage maximum independence but intervene and supervise when necessary  - Involve family in performance of ADLs  - Assess for home care needs following discharge   - Consider OT consult to assist with ADL evaluation and planning for discharge  - Provide patient education as appropriate  Outcome: Progressing  Goal: Maintain or return mobility status to optimal level  Description  INTERVENTIONS:  - Assess patient's baseline mobility status (ambulation, transfers, stairs, etc )    - Identify cognitive and physical deficits and behaviors that affect mobility  - Identify mobility aids required to assist with transfers and/or ambulation (gait belt, sit-to-stand, lift, walker, cane, etc )  - Traver fall precautions as indicated by assessment  - Record patient progress and toleration of activity level on Mobility SBAR; progress patient to next Phase/Stage  - Instruct patient to call for assistance with activity based on assessment  - Consider rehabilitation consult to assist with strengthening/weightbearing, etc   Outcome: Progressing     Problem: DISCHARGE PLANNING  Goal: Discharge to home or other facility with appropriate resources  Description  INTERVENTIONS:  - Identify barriers to discharge w/patient and caregiver  - Arrange for needed discharge resources and transportation as appropriate  - Identify discharge learning needs (meds, wound care, etc )  - Arrange for interpretive services to assist at discharge as needed  - Refer to Case Management Department for coordinating discharge planning if the patient needs post-hospital services based on physician/advanced practitioner order or complex needs related to functional status, cognitive ability, or social support system  Outcome: Progressing     Problem: Knowledge Deficit  Goal: Patient/family/caregiver demonstrates understanding of disease process, treatment plan, medications, and discharge instructions  Description  Complete learning assessment and assess knowledge base    Interventions:  - Provide teaching at level of understanding  - Provide teaching via preferred learning methods  Outcome: Progressing

## 2020-02-17 NOTE — PROGRESS NOTES
Progress Note - Wound   Genette Tapan 80 y o  male MRN: [id number]  Unit/Bed#: -Amanda Encounter: [id number]        Assessment:   Saw patient during Vasile rounds  Patient is a 80year old male  He has a chronic suprapubic pedraza catheter in place  He is currently on contact precautions for r/o C-diff  Patient was assessed in bed, he is alert but very hard of hearing  Findings  POA-healing pressure wound to the left lateral thigh/trochanter  Wound bed is beefy red, no drainage, intact scabbed area to the anterior area of the wound  Scarring noted to the periwound with hyperpigmented skin    Skin Care Plan:   1  Allevyn Life silicone bordered foam to the left lateral thigh/trochanter  2  EHOB waffle cushion to chair when OOB  3  Turn and reposition Q2 hours while in bed using green wedges to offload  4  Elevate heels with pillow to offload  5  Hydraguard to bilateral buttocks, heels and sacrum BID and PRN  6  Skin nourishing cream daily      Wound 02/16/20 Thigh Left;Proximal;Lateral (Active)   Wound Image   2/17/2020 10:35 AM   Wound Description BALWINDER 2/17/2020 11:48 AM   Staging Stage II 2/17/2020 10:35 AM   Kim-wound Assessment Intact 2/17/2020 10:35 AM   Wound Length (cm) 2 cm 2/17/2020 10:35 AM   Wound Width (cm) 2 3 cm 2/17/2020 10:35 AM   Wound Depth (cm) 0 1 2/17/2020 10:35 AM   Calculated Wound Area (cm^2) 4 6 cm^2 2/17/2020 10:35 AM   Calculated Wound Volume (cm^3) 0 46 cm^3 2/17/2020 10:35 AM   Dressing Foam, Silicon (eg  Allevyn, etc) 2/17/2020 11:48 AM   Patient Tolerance Tolerated well 2/17/2020 10:35 AM   Dressing Status Clean;Dry; Intact 2/17/2020 11:48 AM     Reviewed plan of care with primary RN Callie  Recommendations written as orders  Wound care to follow weekly  Questions or concerns contact Desmond BLACKWOOD, RN

## 2020-02-17 NOTE — ASSESSMENT & PLAN NOTE
· POA  · Continue wound care per wound consult:   POA-healing pressure wound to the left lateral thigh/trochanter  Wound bed is beefy red, no drainage, intact scabbed area to the anterior area of the wound  Scarring noted to the periwound with hyperpigmented skin     Skin Care Plan:   1  Allevyn Life silicone bordered foam to the left lateral thigh/trochanter  2  EHOB waffle cushion to chair when OOB  3  Turn and reposition Q2 hours while in bed using green wedges to offload  4  Elevate heels with pillow to offload  5  Hydraguard to bilateral buttocks, heels and sacrum BID and PRN  6   Skin nourishing cream daily

## 2020-02-17 NOTE — PROGRESS NOTES
Progress Note - Bull Primes 11/15/1924, 80 y o  male MRN: [id number]    Unit/Bed#: -01 Encounter: [id number]    Primary Care Provider: Jordan Acevedo   Date and time admitted to hospital: 2/16/2020  5:10 PM        * Diarrhea of presumed infectious origin  Assessment & Plan  · Will place on contact precautions  · Stool studies are currently pending    ELEAZAR (acute kidney injury) ([facility] )  Assessment & Plan  · Placed in observation Medicine  · Likely prerenal secondary to volume depletion  · Hydrate with normal saline at 100 cc/hour  · Will hold pre-hospital Lasix  · Consult Nephrology: volume depletion due to copious diarrhea  Chronic systolic CHF (congestive heart failure) ([facility])  Assessment & Plan  Wt Readings from Last 3 Encounters:   02/17/20 69 4 kg (153 lb)   07/30/19 68 9 kg (152 lb)   01/25/19 68 kg (150 lb)     · Will continue pre-hospital Lopressor 25 mg p o  B i d ,   · Lasix is currently on hold secondary to acute kidney injury,   · will obtain daily weights        COPD (chronic obstructive pulmonary disease) ([facility])  Assessment & Plan  · Continue pre-hospital Symbicort 160/4 52 puffs b i d  And give Duo nebs duo nebs q 6 hours while awake  History of DVT (deep vein thrombosis)  Assessment & Plan  · Continue pre-hospital Eliquis 5 mg p o  Daily    Hypercholesterolemia  Assessment & Plan  · Substitute Pravachol 80 mg p o  Daily for pre-hospital Zocor    Pressure injury of skin of left thigh  Assessment & Plan  · POA  · Continue wound care per wound consult:   POA-healing pressure wound to the left lateral thigh/trochanter  Wound bed is beefy red, no drainage, intact scabbed area to the anterior area of the wound  Scarring noted to the periwound with hyperpigmented skin     Skin Care Plan:   1  Allevyn Life silicone bordered foam to the left lateral thigh/trochanter  2  EHOB waffle cushion to chair when OOB  3  Turn and reposition Q2 hours while in bed using green wedges to offload  4  Elevate heels with pillow to offload  5  Hydraguard to bilateral buttocks, heels and sacrum BID and PRN  6  Skin nourishing cream daily      VTE Pharmacologic Prophylaxis: Pharmacologic: Apixaban (Eliquis)    Patient Centered Rounds: I have performed bedside rounds with nursing staff today  Discussions with Specialists or Other Care Team Provider: nursing, PT/OT, case management, nephrology, wound care  Education and Discussions with Family / Patient: discussed with the patient, his children at the bedside    Current Length of Stay: 0 day(s)    Current Patient Status: Observation   Certification Statement: The patient will continue to require additional inpatient hospital stay due to Continue monitoring of lab data, awaiting stool results  Discharge Plan:  Back to the [place] when appropriate    Code Status: Level 3 - DNAR and DNI    Subjective:   Patient is sitting in bed  He is extremely hard of hearing even with his hearing aids in  Patient states that he does not feel bad, and never did feel bad  His only complaint is that he has to squeezes muscles in his stomach tight in order not to have a bowel movement on the floor because "it comes on real quick "    Objective:     Vitals:   Temp (24hrs), Av 7 °F (36 5 °C), Min:96 8 °F (36 °C), Max:98 2 °F (36 8 °C)    Temp:  [96 8 °F (36 °C)-98 2 °F (36 8 °C)] 96 8 °F (36 °C)  HR:  [63-95] 63  Resp:  [16-18] 18  BP: (112-155)/(52-74) 124/52  SpO2:  [92 %-99 %] 97 %  Body mass index is 20 75 kg/m²  Input and Output Summary (last 24 hours): Intake/Output Summary (Last 24 hours) at 2020 1446  Last data filed at 2020 1310  Gross per 24 hour   Intake 1980 ml   Output    Net 1980 ml       Physical Exam:     Physical Exam   Constitutional: He is oriented to person, place, and time  Vital signs are normal  He appears well-developed  He is cooperative  HENT:   Head: Normocephalic and atraumatic  Right Ear: Decreased hearing is noted     Left Ear: Decreased hearing is noted  Nose: Nose normal    Mouth/Throat: Oropharynx is clear and moist and mucous membranes are normal    Eyes: Conjunctivae and EOM are normal    Neck: Full passive range of motion without pain  Cardiovascular: Normal rate, regular rhythm and normal pulses  Murmur heard  Systolic murmur is present with a grade of 3/6  Pulmonary/Chest: Effort normal and breath sounds normal    Abdominal: Soft  Normal appearance  Bowel sounds are increased  Genitourinary:   Genitourinary Comments: Chronic suprapubic Acevedo catheter  Musculoskeletal:   Ambulates well with roller walker   Neurological: He is alert and oriented to person, place, and time  Pleasantly forgetful   Skin:        Psychiatric: He has a normal mood and affect  His speech is normal and behavior is normal        Additional Data:     Labs:    Results from last 7 days   Lab Units 02/17/20  0544 02/16/20  1723   WBC Thousand/uL 8 60 10 50   HEMOGLOBIN g/dL 12 7* 14 5   HEMATOCRIT % 40 6* 46 3   PLATELETS Thousands/uL 148* 173   NEUTROS PCT %  --  55   LYMPHS PCT %  --  35   MONOS PCT %  --  9   EOS PCT %  --  1     Results from last 7 days   Lab Units 02/17/20  0544 02/16/20  1723   POTASSIUM mmol/L 3 7 4 6   CHLORIDE mmol/L 107 101   CO2 mmol/L 25 27   BUN mg/dL 19 24   CREATININE mg/dL 1 12 1 57*   CALCIUM mg/dL 7 9* 8 9   ALK PHOS U/L  --  57   ALT U/L  --  15   AST U/L  --  31                   * I Have Reviewed All Lab Data Listed Above  * Additional Pertinent Lab Tests Reviewed:  [facility]  [facility]    Imaging:  Imaging Reports Reviewed Today Include:  None    Recent Cultures (last 7 days):           Last 24 Hours Medication List:     Current Facility-Administered Medications:  acetaminophen 650 mg Oral Q6H PRN Halie Cable PA-C    apixaban 5 mg Oral BID Halie Cable PA-C    budesonide-formoterol 2 puff Inhalation BID Halie Cable PA-C    ipratropium-albuterol 3 mL Nebulization Q6H PRN Jay Armando MD    ondansetron 4 mg Intravenous Q6H PRN Lionel Hays PA-C    oxybutynin 15 mg Oral HS Lionel Hays PA-C    pravastatin 80 mg Oral Daily With Flavio Landers PA-C    sodium chloride 50 mL/hr Intravenous Continuous Manolo Phelps MD Last Rate: 50 mL/hr (02/17/20 1002)        Today, Patient Was Seen By: SEEMA Lee    ** Please Note: Dictation voice to text software may have been used in the creation of this document   **

## 2020-02-17 NOTE — PHYSICAL THERAPY NOTE
Physical Therapy Evaluation     Patient's Name: Norberto Matta    Admitting Diagnosis  Diarrhea [R19 7]  ELEAZAR (acute kidney injury) ([place] [address] ) [N17 9]    Problem List  Patient Active Problem List   Diagnosis    COPD (chronic obstructive pulmonary disease) ([place] [address] )    Chronic indwelling Acevedo catheter    Abdominal aneurysm ([place] [address] )    Allergic rhinitis    Aortic stenosis    Diverticulosis of colon    DVT (deep venous thrombosis) ([place] [address] )    History of total hip replacement    Hypercholesterolemia    Impaired fasting glucose    Malignant neoplasm of prostate ([place] [address] )    Meniere's disease    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 ([place] [address] )    Occlusion and stenosis of carotid artery without mention of cerebral infarction    Spondylosis    Panlobular emphysema ([place] [address] )    Retention of urine    Chronic systolic CHF (congestive heart failure) ([place] [address] )    Diarrhea of presumed infectious origin    ELEAZAR (acute kidney injury) ([place] [address] )       Past Medical History  Past Medical History:   Diagnosis Date    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     COPD (chronic obstructive pulmonary disease) ([place] [address] )     Hypercholesteremia     Hyperlipidemia     Malignant neoplasm prostate ([place] [address] )     Phimosis     Stress incontinence     Urinary retention     Urinary urgency        Past Surgical History  Past Surgical History:   Procedure Laterality Date    BLADDER FULGURATION  2012, 2013    [facility] [place]  [facility]   [address]    TOTAL HIP ARTHROPLASTY Left 2012    URINARY SPHINCTER IMPLANT  1996    Repaired in 2002; Removed in 2012    UROFLOWMETRY SIMPLE / COMPLEX  1996 02/17/20 1408   Note Type   Note type Eval only   Pain Assessment   Pain Assessment No/denies pain   Pain Score No Pain   Home Living   Type of Home Assisted living  ([facility])   Home Layout One level;Performs ADLs on one level; Able to live on main level with bedroom/bathroom; Access; Ramped entrance   Bathroom Shower/Tub Walk-in shower   Bathroom Toilet Raised   Bathroom Equipment Grab bars in shower;Grab bars around toilet   Bathroom Accessibility Accessible via walker   [address] [facility]  (rolling)   Prior Function   Level of [place] Independent with ADLs and functional mobility; Needs assistance with IADLs   Lives With Facility staff   Receives Help From Personal care attendant   ADL Assistance Independent   IADLs Needs assistance   Falls in the last 6 months 0   Vocational Retired   Restrictions/Precautions   [place] Bearing Precautions Per Order No   Other Precautions Hard of hearing; Fall Risk;O2;Contact/isolation  (2 L O2 via NC,utilizes at home as well)   General   Family/Caregiver Present No   Cognition   Overall Cognitive Status WFL   Arousal/Participation Alert   Orientation Level Oriented X4   Memory Within functional limits   Following Commands Follows one step commands without difficulty   RUE Assessment   RUE Assessment   (Please see OT assessment )   LUE Assessment   LUE Assessment   (Please see OT assessment )   RLE Assessment   RLE Assessment X  (4-/5 gross musculature)   LLE Assessment   LLE Assessment X  (4-/5 gross musculature)   Coordination   Movements are Fluid and Coordinated 1   Light Touch   RLE Light Touch Grossly intact   LLE Light Touch Grossly intact   Sharp/Dull   RLE Sharp/Dull Grossly intact   LLE Sharp/Dull Grossly intact   Bed Mobility   Supine to Sit 5  Supervision   Additional items Assist x 1;HOB elevated; Bedrails; Increased time required;Verbal cues   Sit to Supine 5  Supervision   Additional items Assist x 1;Bedrails; Increased time required;Verbal cues;LE management   Transfers   Sit to Stand   (CGA)   Additional items Assist x 1;Bedrails; Increased time required;Verbal cues   Stand to Sit   (CGA)   Additional items Assist x 1;Bedrails;Verbal cues; Increased time required   Stand pivot   (CGA)   Additional items Assist x 1;Verbal cues   Ambulation/Elevation   Gait pattern Narrow FLACO;Forward Flexion;Decreased foot clearance; Short stride   Gait Assistance   (CGA)   Additional items Assist x 1;Verbal cues; Tactile cues   Assistive Device Rolling walker   Distance 35 feet x 2   Stair Management Assistance Not tested  (N/A)   Balance   Static Sitting Good   Dynamic Sitting Fair +   Static Standing Fair   Dynamic Standing Fair   Ambulatory Fair   Endurance Deficit   Endurance Deficit Yes   Endurance Deficit Description Xavier Briceno exhibited slight SOB,HOLLOWAY w/ambulatory progression  Symptoms resolved w/resting position and compensatory breathing  Activity Tolerance   Activity Tolerance Patient tolerated treatment well  (HOLLOWAY,SOB)   Nurse Made Aware yes   Assessment   Prognosis Good   Problem List Decreased strength;Decreased endurance; Impaired balance;Decreased mobility; Impaired hearing   Assessment Pt is 80 y o  male seen for PT evaluation s/p admit to [address] on 2/16/2020 w/ ELEAZAR (acute kidney injury) ([facility] [address] )  PT consulted to assess pt's functional mobility and d/c needs  Order placed for PT eval and tx, w/ eval & treat order  Comorbidities affecting pt's physical performance at time of assessment include: ELEAZAR, COPD w/ O2 usage, SOB,HOLLOWAY, CHF,emphysema,[place]  PTA, pt was residing at the [facility] and completed ADL's independently  Personal factors affecting pt at time of IE include: inability to navigate community distances, unable to perform dynamic tasks in community and inability to perform ADLs  Please find objective findings from PT assessment regarding body systems outlined above with impairments and limitations including weakness, impaired balance, decreased endurance, gait deviations, decreased activity tolerance, fall risk and SOB upon exertion  The following objective measures performed on IE also reveal limitations: Barthel Index: 45/100  Pt's clinical presentation is currently unstable/unpredictable seen in pt's presentation of SOB,HOLLOWAY,abnormal lab results   Pt to benefit from continued PT tx to address deficits as defined above and maximize level of functional independent mobility and consistency  From PT/mobility standpoint, recommendation at time of d/c would be Home PT pending progress in order to facilitate return to PLOF  Goals   Patient Goals feel better soon   LTG Expiration Date 02/27/20   Long Term Goal #1 1 )Patient will complete bed mobility modified I  for decrease need for caregiver assistance, decrease burden of care  2 ) Patient will complete transfers modified I  to decrease risk of falls, facilitate upright standing posture  3 ) BLE strength to greater than/equal to 4/5 gross musculature to increase ability to safely transfer, control descent to chair  4 ) Patient will exhibit increase dynamic standing balance to Good, for 3-5 minutes without LOB and supervision of 1 to improve activity tolerance & endurance  5 ) Patient will exhibit increase dynamic ambulatory balance to Good for 200-300 feet w/RW modified I to improve ability to mobilize to toilet, chair and decrease risk for additional medical complications, resume PLOF  6 ) Patient will exhibit good self monitoring and ability to follow 2 step commands to increase complexity of tasks and resume ADL's without LOB  PT Treatment Day 0   Plan   Treatment/Interventions Functional transfer training;LE strengthening/ROM; Therapeutic exercise; Endurance training;Bed mobility;Gait training;Spoke to nursing;OT;Equipment eval/education   PT Frequency Other (Comment)  (3-5x/wk)   Recommendation   Recommendation Home PT  (at [facility])   Equipment Recommended Walker  (pt  has own)   PT - OK to Discharge No   Additional Comments Upon conclusion, pt  was resting in bed w/all needs within reach     Barthel Index   Feeding 10   Bathing 5   Grooming Score 5   Dressing Score 5   Bladder Score 0   Bowels Score 5   Toilet Use Score 5   Transfers (Bed/Chair) Score 10   Mobility (Level Surface) Score 0   Stairs Score 0   Barthel Index Score 45       Mora Rohan, PT

## 2020-02-17 NOTE — ASSESSMENT & PLAN NOTE
· Placed in observation Medicine  · Likely prerenal secondary to volume depletion  · Hydrate with normal saline at 100 cc/hour  · Will hold pre-hospital Lasix  · Consult Nephrology in a m

## 2020-02-17 NOTE — CONSULTS
Consultation - Nephrology   [place] 80 y o  male MRN: [id number]  Unit/Bed#: -01 Encounter: [id number]      A/P:  1  Acute kidney injury: (POA)   Due to volume depletion and possible ATN due to hypoperfusion with diarrhea and furosemide  The patient was given saline at 100 mils an hour and has improved  back to his baseline  He was admitted with a creatinine of 1 57  We do not have historic dated  His creatinine is 1 12 today  Will check urine studies for protein and a UA  However, it appears that his acute kidney injury was due to volume depletion due to copious diarrhea  He is eating and drinking now  Continue IV fluids and check lab work in the morning  2  Chronic systolic congestive heart failure:  Hold Lasix but would reduce IV fluid rate  Check intake and outputs and daily weights  His weight is increased 1 3 kg since admission if accurate  3  Diarrhea presumed infectious origin:  Stool studies are pending  4  Chronic urinary retention:  Continue chronic Acevedo catheter  He is nonoliguric  5  CKD 3B: baseline creatinine on records review is 1 1-1 24         Thank you for allowing us to participate in the care of your patient  Please feel free to contact us regarding the care of this patient, or any other questions/concerns that may be applicable      Patient Active Problem List   Diagnosis    [facility]chronic obstructive pulmonary disease) ([facility] [address] [facility]    Chronic indwelling Acevedo catheter    Abdominal aneurysm (HCC)    Allergic rhinitis    Aortic stenosis    Diverticulosis of colon    DVT [facility]deep venous thrombosis) ([facility])    History of total hip replacement    Hypercholesterolemia    Impaired fasting glucose    Malignant neoplasm of prostate ([facility] [address] )    Meniere's disease    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 ([facility] [address] )    Occlusion and stenosis of carotid artery without mention of cerebral infarction    Spondylosis    Panlobular emphysema ([facility] [address] )    Retention of urine    Chronic systolic CHF (congestive heart failure) ([facility])    Diarrhea of presumed infectious origin    ELEAZAR (acute kidney injury) ([facility] )       History of Present Illness   Physician Requesting Consult: Abrahan Rodriguez MD  Reason for Consult / Principal Problem: acute kidney injury  Hx and PE limited by:   HPI: Shiela Gutierrez is a 80y o  year old male who presents with acute kidney injury noted on emergency department labs  He did have 5 days of diarrhea 4 to 5 times a day associated with watery texture without blood  He denied any sick contacts, abdominal pain fever chills  He is feeling better although he did have diarrhea this morning  But he is eating and drinking  Of note that he was taking his metoprolol and Lasix    History obtained from chart review and the patient    Review of Systems - Negative except as mentioned above in HPI, more specifics as mentioned below    Review of Systems - General ROS: negative  Ophthalmic ROS: negative  ENT ROS: positive for - hearing change  Respiratory ROS: no cough, shortness of breath, or wheezing  Cardiovascular ROS: no chest pain or dyspnea on exertion  Gastrointestinal ROS: positive for - change in bowel habits and diarrhea  Genito-Urinary ROS: chronic indwelling pedraza  Musculoskeletal ROS: negative  Neurological ROS: no TIA or stroke symptoms  Dermatological ROS: negative    Historical Information   Past Medical History:   Diagnosis Date    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     COPD (chronic obstructive pulmonary disease) ([facility] )     Hypercholesteremia     Hyperlipidemia     Malignant neoplasm prostate ([facility] )     Phimosis     Stress incontinence     Urinary retention     Urinary urgency      Past Surgical History:   Procedure Laterality Date    BLADDER FULGURATION  2012, 2013    [facility], [place]  2013    PROSTATECTOMY  1995    TOTAL HIP ARTHROPLASTY Left 2012    URINARY SPHINCTER IMPLANT  1996    Repaired in 2002; Removed in 2012    UROFLOWMETRY SIMPLE / COMPLEX  1996     Social History   Social History     Substance and Sexual Activity   Alcohol Use Never    Frequency: Never     Social History     Substance and Sexual Activity   Drug Use No     Social History     Tobacco Use   Smoking Status Former Smoker    Packs/day: 0 50    Types: Cigarettes    Start date: 1947   Smokeless Tobacco Never Used     Family History   Family history unknown: Yes       Meds/Allergies   all current active meds have been reviewed, current meds:   Current Facility-Administered Medications   Medication Dose Route Frequency    acetaminophen (TYLENOL) tablet 650 mg  650 mg Oral Q6H PRN    apixaban (ELIQUIS) tablet 5 mg  5 mg Oral BID    budesonide-formoterol (SYMBICORT) 160-4 5 mcg/act inhaler 2 puff  2 puff Inhalation BID    ipratropium-albuterol (DUO-NEB) 0 5-2 5 mg/3 mL inhalation solution 3 mL  3 mL Nebulization Q6H PRN    ondansetron (ZOFRAN) injection 4 mg  4 mg Intravenous Q6H PRN    oxybutynin (DITROPAN-XL) 24 hr tablet 15 mg  15 mg Oral HS    pravastatin (PRAVACHOL) tablet 80 mg  80 mg Oral Daily With Dinner    sodium chloride 0 9 % infusion  100 mL/hr Intravenous Continuous    and PTA meds:    Medications Prior to Admission   Medication    apixaban (ELIQUIS) 5 mg    budesonide-formoterol (SYMBICORT) 160-4 5 mcg/act inhaler    furosemide (LASIX) 40 mg tablet    loperamide (IMODIUM) 2 mg capsule    meclizine (ANTIVERT) 25 mg tablet    metoprolol tartrate (LOPRESSOR) 25 mg tablet    Multiple Vitamin (MULTIVITAMIN) tablet    oxybutynin (DITROPAN XL) 15 MG 24 hr tablet    potassium chloride (K-DUR,KLOR-CON) 10 mEq tablet    simvastatin (ZOCOR) 40 mg tablet    acetaminophen (TYLENOL) 325 mg tablet    acetaminophen-codeine (TYLENOL #3) 300-30 mg per tablet    COMBIVENT RESPIMAT inhaler    docusate sodium (COLACE) 100 mg capsule    zinc oxide 20 % ointment         Allergies   Allergen Reactions    Prednisone Shortness Of Breath    Ciprofloxacin Other (See Comments)       Objective     Intake/Output Summary (Last 24 hours) at 2/17/2020 0939  Last data filed at 2/17/2020 0921  Gross per 24 hour   Intake 1860 ml   Output    Net 1860 ml       Invasive Devices:        Physical Exam      I/O last 3 completed shifts: In: 1500 [I V :1000; IV Piggyback:500]  Out: -     Vitals:    02/17/20 0740   BP: 124/52   Pulse: 63   Resp: 18   Temp: (!) 96 8 °F (36 °C)   SpO2: 97%       Gen: in NAD, Alert/Awake  HEENT: no sclerous icterus, dry mucous membrane , neck supple  CV: +S1/S2, RRR  Lungs: CTA bilaterally  Abd: +BS, soft NT/ND  Ext: all four extremities are warm  Skin: no rashes noted  Neuro: CN II-XII intact    Current Weight: Weight - Scale: 69 4 kg (153 lb)  First Weight: Weight - Scale: 68 8 kg (151 lb 11 2 oz)    Lab Results:  I have personally reviewed pertinent labs      CBC:   Lab Results   Component Value Date    WBC 8 60 02/17/2020    HGB 12 7 (L) 02/17/2020    HCT 40 6 (L) 02/17/2020    MCV 85 02/17/2020     (L) 02/17/2020    MCH 26 4 02/17/2020    MCHC 31 2 02/17/2020    RDW 15 6 (H) 02/17/2020    MPV 10 0 02/17/2020     CMP:   Lab Results   Component Value Date    K 3 7 02/17/2020     02/17/2020    CO2 25 02/17/2020    BUN 19 02/17/2020    CREATININE 1 12 02/17/2020    CALCIUM 7 9 (L) 02/17/2020    AST 31 02/16/2020    ALT 15 02/16/2020    ALKPHOS 57 02/16/2020    EGFR 56 02/17/2020     Phosphorus: No results found for: PHOS  Magnesium: No results found for: MG  Urinalysis: No results found for: Bearl Heman, SPECGRAV, PHUR, LEUKOCYTESUR, NITRITE, PROTEINUA, GLUCOSEU, KETONESU, BILIRUBINUR, BLOODU  Ionized Calcium: No results found for: CAION  Coagulation: No results found for: PT, INR, APTT  Troponin: No results found for: TROPONINI  ABG: No results found for: PHART, DTI0PAQ, PO2ART, VFM6HLI, Q0XZSNPX, BEART, SOURCE    Results from last 7 days   Lab Units 02/17/20  0544 02/16/20  1723   POTASSIUM mmol/L 3 7 4 6 CHLORIDE mmol/L 107 101   CO2 mmol/L 25 27   BUN mg/dL 19 24   CREATININE mg/dL 1 12 1 57*   CALCIUM mg/dL 7 9* 8 9   ALK PHOS U/L  --  57   ALT U/L  --  15   AST U/L  --  31       Radiology review:  Procedure: Ct Abdomen Pelvis Wo Contrast    Result Date: 2/16/2020  Narrative: CT ABDOMEN AND PELVIS WITHOUT IV CONTRAST INDICATION:   N/V/D, 79 yo  colitis?   COMPARISON:  None  TECHNIQUE:  CT examination of the abdomen and pelvis was performed without intravenous contrast   Axial, sagittal, and coronal 2D reformatted images were created from the source data and submitted for interpretation  Radiation dose length product (DLP) for this visit:  320 9 mGy-cm   This examination, like all CT scans performed in the [facility], was performed utilizing techniques to minimize radiation dose exposure, including the use of iterative reconstruction and automated exposure control  Enteric contrast was not administered  FINDINGS: ABDOMEN LOWER CHEST:  Cardiomegaly  Heavy coronary artery and mitral annular calcification  No pericardial effusion  Nonspecific interstitial changes of the lung bases  LIVER/BILIARY TREE:  Unremarkable  GALLBLADDER:  Cholelithiasis  SPLEEN: Normal size  Multiple rounded hypoattenuating lesions measuring up to 2 8 cm in diameter, incompletely characterized  PANCREAS:  Moderate parenchymal atrophy  No focal lesion or main ductal dilation  ADRENAL GLANDS:  Unremarkable  KIDNEYS/URETERS:  3 mm right renal vascular calcification or nonobstructing calculus  No hydronephrosis  Left renal lower pole 1 5 cm low-attenuation lesion, incompletely characterized  STOMACH AND BOWEL:  No disproportionate dilation of small or large bowel loops  Colonic diverticulosis without evidence of acute diverticulitis within the visualized portion, noting that short segment of the sigmoid is obscured by intense streak artifact  Watery stool in the colon is consistent with diarrheal state   APPENDIX:  No findings to suggest appendicitis  ABDOMINOPELVIC CAVITY:  No ascites or free intraperitoneal air  No lymphadenopathy  VESSELS:  Severe atherosclerotic calcification of the aorta and its major abdominal branches     4 0 cm infrarenal abdominal aortic aneurysm  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 measuring 1 4-1 5 cm in diameter  PELVIS REPRODUCTIVE ORGANS:  Status post prostatectomy  The surgical bed is partially obscured by streak artifact  URINARY BLADDER:  Partially obscured  Decompressed around a suprapubic catheter and poorly evaluated ABDOMINAL WALL/INGUINAL REGIONS: Bilateral subcutaneous calcified granulomas within the bilateral buttocks  OSSEOUS STRUCTURES:  No acute fracture or destructive osseous lesion  Left hip arthroplasty  Diffuse bony demineralization  Impression: No definitive acute abnormality in the abdomen or pelvis  Colonic diverticulosis without evidence of diverticulitis, noting that a short segment of the sigmoid colon is obscured by intense streak artifact from left hip arthroplasty  Watery stool in the colon is consistent with diarrheal state  CT might not be sensitive for low-grade enterocolitis  4 0 cm infrarenal abdominal aortic aneurysm  Additional chronic findings as described  Workstation performed: [id number]         EKG, Pathology, and Other Studies: I have reviewed all labs and radiographic studies      Counseling / Coordination of Care  Total ADDITIONAL floor / unit time spent today 30 minutes  Greater than 50% of total time was spent with the patient and / or family counseling and / or coordination of care  A description of the counseling / coordination of care: follows    Peri Izquierdo MD      This consultation note was produced in part using a dictation device which may document imprecise wording from author's original intent

## 2020-02-17 NOTE — OCCUPATIONAL THERAPY NOTE
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     COPD (chronic obstructive pulmonary disease) ([facility] )     Hypercholesteremia     Hyperlipidemia     Malignant neoplasm prostate ([facility] )     Phimosis     Stress incontinence     Urinary retention     Urinary urgency        Past Surgical History:   Procedure Laterality Date    BLADDER FULGURATION  2012, 2013    CIRCUMCISION, [place]  2013    PROSTATECTOMY  1995    TOTAL HIP ARTHROPLASTY Left 2012    URINARY SPHINCTER IMPLANT  1996    Repaired in 2002; Removed in 2012    UROFLOWMETRY SIMPLE / COMPLEX  1996 02/17/20 1405   Note Type   Note type Eval only   Restrictions/Precautions   Weight Bearing Precautions Per Order No   Other Precautions Fall Risk;O2;Hard of hearing;Contact/isolation  (2L O2)   Pain Assessment   Pain Assessment No/denies pain   Pain Score No Pain   Home Living   Type of Home Assisted living  ([facility])   Home Layout One level;Performs ADLs on one level; Able to live on main level with bedroom/bathroom; Access; Ramped entrance;Elevator   Bathroom Shower/Tub Walk-in shower   Bathroom Toilet Raised   Bathroom Equipment Grab bars in shower; Shower chair;Grab bars around toilet   Bathroom Accessibility Accessible via wheelchair   [address] [facility]  ([place])   Prior Function   Level of [place] Independent with ADLs and functional mobility   Lives With Facility staff   Receives Help From Personal care attendant   ADL Assistance Independent   IADLs Needs assistance   Falls in the last 6 months 0   Vocational Retired   Psychosocial Psychosocial (WDL) WDL   Subjective   Subjective agreeable to therapy eval, expressed desired to return to bed following session   ADL   Eating Assistance 7  Independent   Grooming Assistance 5  Supervision/Setup   [address] 4  Minimal Assistance   UB Bathing Deficit Setup   LB Bathing Assistance 4  Minimal Assistance   LB Bathing Deficit Setup   [address] 4  Minimal Assistance   LB Dressing Assistance 3  Moderate Assistance   Bed Mobility   [place] L 6  Modified independent   Additional items Bedrails   Supine to Sit 5  Supervision   Additional items Assist x 1;HOB elevated; Bedrails; Increased time required;Verbal cues   Sit to Supine 5  Supervision   Additional items Assist x 1;Bedrails; Increased time required;Verbal cues;LE management   Transfers   Sit to Stand   (CGA)   Additional items Assist x 1;Bedrails; Increased time required;Verbal cues   Stand to Sit   (CGA)   Additional items Assist x 1;Bedrails; Increased time required;Verbal cues   Toilet transfer Unable to assess   Additional items   (pt declined, stated recently in bathroom)   Balance   Static Sitting Good   Dynamic Sitting Fair +   Static Standing Fair   Dynamic Standing Fair   Ambulatory Fair   Activity Tolerance   Activity Tolerance Patient tolerated treatment well  (HOLLOWAY)   Nurse Made Aware yes   RUE Assessment   RUE Assessment WFL   LUE Assessment   LUE Assessment WFL   Hand Function   Gross Motor Coordination Functional   Fine Motor Coordination Functional   Cognition   Overall Cognitive Status WFL   Arousal/Participation Alert; Cooperative   Attention Within functional limits   Orientation Level Oriented X4   Memory Within functional limits   Following Commands Follows one step commands without difficulty   Assessment   Limitation Decreased ADL status; Decreased Safe judgement during ADL;Decreased self-care trans   Prognosis Good   Assessment Pt is a 80 y o  male seen for OT evaluation s/p admit to [address] on 2/16/2020 w/ ELEAZAR (acute kidney injury) ([facility] )  Comorbidities affecting pt's functional performance at time of assessment include: COPD and CHF, Diarrhea, Chronic indwelling catheter, see H & P for additional PMHx  Personal factors affecting pt at time of IE include:difficulty performing ADLS  Prior to admission, pt was I with self care ADL's and self toileting, D for IADL's  Upon evaluation: Pt requires a slight increased level of assistance with self care and functional transfers/mobility/toileting r/t HOLLOWAY and the following deficits impacting occupational performance: decreased balance, decreased tolerance and decreased safety awareness  Pt to benefit from continued skilled OT tx while in the hospital to address deficits as defined above and maximize level of functional independence w ADL's and functional mobility  Occupational Performance areas to address include: bathing/shower, dressing and functional mobility  From OT standpoint, recommendation at time of d/c would be return to the [facility] with in house therapy at the facility  Goals   Patient Goals to feel better   STG Time Frame 3-5   Short Term Goal #1 increase knowledge re: adaptations to good to increase safety and independence with self care   Short Term Goal #2 increase bed mobility to MI for self care participation   LTG Time Frame 7-10   Long Term Goal #1 increase self care ADL's to I/MI as indicated   Long Term Goal #2 increase self toileting to MI with good safety demonstrated   Plan   Treatment Interventions ADL retraining;Functional transfer training;Patient/family training; Compensatory technique education; Energy conservation; Activityengagement   Goal Expiration Date 02/27/20   OT Treatment Day 0   OT Frequency 3-5x/wk   Recommendation   OT Discharge Recommendation Other (Comment)  (return to [facility] with in house therapies)   OT - OK to Discharge Yes   Barthel Index   Feeding 10   Bathing 0  (LB s/w difficult r/t HOLLOWAY and weakness)   Grooming Score 5   Dressing Score 5   Bladder Score 0  (pedraza cath)   Bowels Score 5   Toilet Use Score 5   Transfers (Bed/Chair) Score 10   Mobility (Level Surface) Score 0   Stairs Score 0   Barthel Index Score 40   Pastora Cota

## 2020-02-17 NOTE — SOCIAL WORK
CM met with pt at bedside and explained role  CM also spoke with pt daughter Francoise Whitaker regarding discharge planning  Pt and daughter aware of observation status and provided with notice and brochure regarding same  Pt is a resident of [facility] living [facility]  Pt uses a RW at the facility to ambulate  Pt also has hearing aides  No other DME use reported  Pt PCP is Helen Wu  Pt and daughter deny any history of STR, HHD MH and D&A treatment  Pt has had in house PT at [place] in the past  CM awaiting PT/OT consults for recommednations  Pt states he would like to return to [facility] on discharge  Family will transport  CM to follow  Patient/caregiver received discharge checklist   Content reviewed  Patient/caregiver encouraged to participate in discharge plan of care prior to discharge home  CM reviewed d/c planning process including the following: identifying help at home, patient preference for d/c planning needs, availability of treatment team to discuss questions or concerns patient and/or family may have regarding understanding medications and recognizing signs and symptoms once discharged  CM also encouraged patient to follow up with all recommended appointments after discharge  Patient advised of importance for patient and family to participate in managing patients medical well being

## 2020-02-17 NOTE — ASSESSMENT & PLAN NOTE
· Continue pre-hospital Symbicort 160/4 52 puffs b i d  And give Duo nebs duo nebs q 6 hours while awake

## 2020-02-17 NOTE — ASSESSMENT & PLAN NOTE
· Placed in observation Medicine  · Likely prerenal secondary to volume depletion  · Hydrate with normal saline at 100 cc/hour  · Will hold pre-hospital Lasix  · Consult Nephrology: volume depletion due to copious diarrhea

## 2020-02-17 NOTE — PLAN OF CARE
Problem: OCCUPATIONAL THERAPY ADULT  Goal: Performs self-care activities at highest level of function for planned discharge setting  See evaluation for individualized goals  Description  Treatment Interventions: ADL retraining, Functional transfer training, Patient/family training, Compensatory technique education, Energy conservation, Activityengagement          See flowsheet documentation for full assessment, interventions and recommendations  Note:   Limitation: Decreased ADL status, Decreased Safe judgement during ADL, Decreased self-care trans  Prognosis: Good  Assessment: Pt is a 80 y o  male seen for OT evaluation s/p admit to [address] on 2/16/2020 w/ ELEAZAR (acute kidney injury) ([facility] )  Comorbidities affecting pt's functional performance at time of assessment include: COPD and CHF, Diarrhea, Chronic indwelling catheter, see H & P for additional PMHx  Personal factors affecting pt at time of IE include:difficulty performing ADLS  Prior to admission, pt was I with self care ADL's and self toileting, D for IADL's  Upon evaluation: Pt requires a slight increased level of assistance with self care and functional transfers/mobility/toileting r/t HOLLOWAY and the following deficits impacting occupational performance: decreased balance, decreased tolerance and decreased safety awareness  Pt to benefit from continued skilled OT tx while in the hospital to address deficits as defined above and maximize level of functional independence w ADL's and functional mobility  Occupational Performance areas to address include: bathing/shower, dressing and functional mobility  From OT standpoint, recommendation at time of d/c would be return to the [facility] with in house therapy at the facility  OT Discharge Recommendation: Other (Comment)(return to [facility] with in house therapies)  OT - OK to Discharge:  Yes

## 2020-02-17 NOTE — DISCHARGE INSTR - OTHER ORDERS
Skin Care Plan:   1  Allevyn Life silicone bordered foam to the left lateral thigh/trochanter  2  EHOB waffle cushion to chair when OOB  3  Turn and reposition Q2 hours while in bed using green wedges to offload  4  Elevate heels with pillow to offload  5  Hydraguard to bilateral buttocks, heels and sacrum BID and PRN  6  Skin nourishing cream daily  7   Wound care to follow weekly

## 2020-02-17 NOTE — ASSESSMENT & PLAN NOTE
Continue pre-hospital Symbicort 160/4 52 puffs b i d  And give Duo nebs duo nebs q 6 hours while awake

## 2020-02-17 NOTE — ASSESSMENT & PLAN NOTE
Wt Readings from Last 3 Encounters:   02/16/20 68 1 kg (150 lb 2 1 oz)   07/30/19 68 9 kg (152 lb)   01/25/19 68 kg (150 lb)     Will continue pre-hospital Lopressor 25 mg p o  B i d , Lasix is currently on hold secondary to acute kidney injury, will obtain daily weights

## 2020-02-17 NOTE — PLAN OF CARE
Problem: PHYSICAL THERAPY ADULT  Goal: Performs mobility at highest level of function for planned discharge setting  See evaluation for individualized goals  Description  Treatment/Interventions: Functional transfer training, LE strengthening/ROM, Therapeutic exercise, Endurance training, Bed mobility, Gait training, Spoke to nursing, OT, Equipment eval/education  Equipment Recommended: Walker(pt  has own)       See flowsheet documentation for full assessment, interventions and recommendations  Note:   Prognosis: Good  Problem List: Decreased strength, Decreased endurance, Impaired balance, Decreased mobility, Impaired hearing  Assessment: Pt is 80 y o  male seen for PT evaluation s/p [facility] 2/16/2020 w/ ELEAZAR (acute kidney injury) ([facility] )  PT consulted to assess pt's functional mobility and d/c needs  Order placed for PT eval and tx, w/ eval & treat order  Comorbidities affecting pt's physical performance at time of assessment include: ELEAZAR, COPD w/ O2 usage, SOB,HOLLOWAY, CHF,emphysema,[place]  PTA, pt was residing at the [facility] and completed ADL's independently  Personal factors affecting pt at time of IE include: inability to navigate community distances, unable to perform dynamic tasks in community and inability to perform ADLs  Please find objective findings from PT assessment regarding body systems outlined above with impairments and limitations including weakness, impaired balance, decreased endurance, gait deviations, decreased activity tolerance, fall risk and SOB upon exertion  The following objective measures performed on IE also reveal limitations: Barthel Index: 45/100  Pt's clinical presentation is currently unstable/unpredictable seen in pt's presentation of SOB,HOLLOWAY,abnormal lab results  Pt to benefit from continued PT tx to address deficits as defined above and maximize level of functional independent mobility and consistency   From PT/mobility standpoint, recommendation at time of d/c would be Home PT pending progress in order to facilitate return to PLOF  Recommendation: Home PT([facility])     PT - OK to Discharge: No    See flowsheet documentation for full assessment

## 2020-02-17 NOTE — ASSESSMENT & PLAN NOTE
Wt Readings from Last 3 Encounters:   02/17/20 69 4 kg (153 lb)   07/30/19 68 9 kg (152 lb)   01/25/19 68 kg (150 lb)     · Will continue pre-hospital Lopressor 25 mg p o  B i d ,   · Lasix is currently on hold secondary to acute kidney injury,   · will obtain daily weights

## 2020-02-17 NOTE — UTILIZATION REVIEW
Initial Clinical Review  Observation 2/16/20 @ 1951, converted to inpatient admission 2/17/20 @ [phone] for continued care & tx for diarrhea  Admitting Physician Judy Torres    Level of Care Med Surg    Estimated length of stay More than 2 Midnights    Certification I certify that inpatient services are medically necessary for this patient for a duration of greater than two midnights  See H&P and MD Progress Notes for additional information about the patient's course of treatment            Order History   Inpatient   Date/Time Action Taken User Additional Information   02/17/20 1453 Release SEEMA Guzman (auto-released) From Order: [id number]   02/17/20 1453 Complete SEEMA Guzman      ED Arrival Information     Expected Arrival Acuity Means of Arrival Escorted By Service Admission Type    - 2/16/2020 17:09 Urgent Ambulance [facility] Urgent    Arrival Complaint    diarrhea        Chief Complaint   Patient presents with    Diarrhea     x4 days, (-) abd pain, lives at [facility]     Assessment/Plan:   89 yom to er via ems from home With diarrhea for about 4 days, no fever, no abd pain  States he has had no uti sx, no vomiting  Able to tolerate po at home    ELEAZAR (acute kidney injury) ([facility] )  Assessment & Plan  · Placed in observation Medicine  · Likely prerenal secondary to volume depletion  · Hydrate with normal saline at 100 cc/hour  · Will hold pre-hospital Lasix  · Consult Nephrology in a m      Diarrhea of presumed infectious origin  Assessment & Plan  · Will place on contact precautions  · Stool studies are currently pending    ED Triage Vitals   Temperature Pulse Respirations Blood Pressure SpO2   02/16/20 1709 02/16/20 1709 02/16/20 1709 02/16/20 1709 02/16/20 1709   98 1 °F (36 7 °C) 95 16 135/70 92 %      Temp Source Heart Rate Source Patient Position - Orthostatic VS BP Location FiO2 (%)   02/16/20 1709 02/16/20 1709 02/16/20 1709 02/16/20 1709 --   Temporal Monitor Lying Left arm       Pain Score       02/16/20 2130       No Pain        Wt Readings from Last 1 Encounters:   02/17/20 69 4 kg (153 lb)     Additional Vital Signs:   02/17/20 0740  96 8 °F (36 °C)Abnormal   63  18  124/52  97 %  Nasal cannula  Lying   02/16/20 2329  97 7 °F (36 5 °C)  94  18  112/55  98 %  Nasal cannula  Lying   02/16/20 2130            Nasal cannula     02/16/20 2030  98 2 °F (36 8 °C)  85  18  148/68  99 %  Nasal cannula  Lying   Pertinent Labs/Diagnostic Test Results:   Results from last 7 days   Lab Units 02/17/20  0544 02/16/20  1723   WBC Thousand/uL 8 60 10 50   HEMOGLOBIN g/dL 12 7* 14 5   HEMATOCRIT % 40 6* 46 3   PLATELETS Thousands/uL 148* 173   NEUTROS ABS Thousands/µL  --  5 70     Results from last 7 days   Lab Units 02/17/20  0544 02/16/20  1723   SODIUM mmol/L 143 141   POTASSIUM mmol/L 3 7 4 6   CHLORIDE mmol/L 107 101   CO2 mmol/L 25 27   ANION GAP mmol/L 11 13   BUN mg/dL 19 24   CREATININE mg/dL 1 12 1 57*   EGFR ml/min/1 73sq m 56 37   CALCIUM mg/dL 7 9* 8 9     Results from last 7 days   Lab Units 02/16/20  1723   AST U/L 31   ALT U/L 15   ALK PHOS U/L 57   TOTAL PROTEIN g/dL 7 1   ALBUMIN g/dL 3 7   TOTAL BILIRUBIN mg/dL 1 30*     Results from last 7 days   Lab Units 02/17/20  0544 02/16/20  1723   GLUCOSE RANDOM mg/dL 66 116*     2/16  Ct a/p=No definitive acute abnormality in the abdomen or pelvis  Colonic diverticulosis without evidence of diverticulitis, noting that a short segment of the sigmoid colon is obscured by intense streak artifact from left hip arthroplasty  Watery stool in the colon is consistent with diarrheal state  CT might not be sensitive for low-grade enterocolitis  4 0 cm infrarenal abdominal aortic aneurysm      ED Treatment:   Medication Administration from 02/16/2020 1709 to 02/16/2020 2009       Date/Time Order Dose Route Action     02/16/2020 1758 sodium chloride 0 9 % bolus 500 mL 500 mL Intravenous New Bag        Past Medical History:   Diagnosis Date    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     [facility]     Hypercholesteremia     Hyperlipidemia     Malignant neoplasm prostate ([place] [address] )     Phimosis     Stress incontinence     Urinary retention     Urinary urgency      Present on Admission:   Diarrhea of presumed infectious origin   ELEAZAR (acute kidney injury) ([place] [address] )   COPD (chronic obstructive pulmonary disease) ([place] [address] )   DVT (deep venous thrombosis) ([facility])   Hypercholesterolemia   Chronic systolic CHF (congestive heart failure) ([facility])  Admitting Diagnosis: Diarrhea [R19 7]  ELEAZAR (acute kidney injury) ([place] [address] ) [N17 9]  Age/Sex: 80 y o  male  Admission Orders:  Consult renal  Scd/foot pumps  Scheduled Medications:  apixaban 5 mg Oral BID   budesonide-formoterol 2 puff Inhalation BID   oxybutynin 15 mg Oral HS   pravastatin 80 mg Oral Daily With Dinner     Continuous IV Infusions:  sodium chloride 100 mL/hr Intravenous Continuous     PRN Meds:  acetaminophen 650 mg Oral Q6H PRN   ipratropium-albuterol 3 mL Nebulization Q6H PRN   ondansetron 4 mg Intravenous Q6H PRN     Network Utilization Review Department  [email or web address]  ATTENTION: Please call with any questions or concerns to [phone] and carefully listen to the prompts so that you are directed to the right person  All voicemails are confidential   Kendra Levine all requests for admission clinical reviews, approved or denied determinations and any other requests to dedicated fax number below belonging to the campus where the patient is receiving treatment   List of dedicated fax numbers for the Facilities:  FACILITY NAME UR FAX NUMBER   ADMISSION DENIALS (Administrative/Medical Necessity) [phone]   [address] [place] [address] (Maternity/NICU/Pediatrics) [phone]   Sindy Sheldon [address] [phone]   [facility]  [phone]   [address]  [phone]   [address] [place] [address] [address] [facility] [phone]

## 2020-02-18 PROBLEM — N18.30 ACUTE RENAL FAILURE WITH ACUTE TUBULAR NECROSIS SUPERIMPOSED ON STAGE 3 CHRONIC KIDNEY DISEASE (HCC): Status: ACTIVE | Noted: 2020-02-16

## 2020-02-18 PROBLEM — N17.0 ACUTE RENAL FAILURE WITH ACUTE TUBULAR NECROSIS SUPERIMPOSED ON STAGE 3 CHRONIC KIDNEY DISEASE (HCC): Status: ACTIVE | Noted: 2020-02-16

## 2020-02-18 LAB
ANION GAP SERPL CALCULATED.3IONS-SCNC: 7 MMOL/L (ref 4–13)
BASOPHILS # BLD AUTO: 0 THOUSANDS/ΜL (ref 0–0.1)
BASOPHILS NFR BLD AUTO: 0 % (ref 0–2)
BUN SERPL-MCNC: 12 MG/DL (ref 7–25)
CALCIUM SERPL-MCNC: 8.1 MG/DL (ref 8.6–10.5)
CAMPYLOBACTER DNA SPEC NAA+PROBE: NORMAL
CHLORIDE SERPL-SCNC: 107 MMOL/L (ref 98–107)
CO2 SERPL-SCNC: 27 MMOL/L (ref 21–31)
CREAT SERPL-MCNC: 0.94 MG/DL (ref 0.7–1.3)
EOSINOPHIL # BLD AUTO: 0.1 THOUSAND/ΜL (ref 0–0.61)
EOSINOPHIL NFR BLD AUTO: 1 % (ref 0–5)
ERYTHROCYTE [DISTWIDTH] IN BLOOD BY AUTOMATED COUNT: 15.5 % (ref 11.5–14.5)
GFR SERPL CREATININE-BSD FRML MDRD: 69 ML/MIN/1.73SQ M
GLUCOSE SERPL-MCNC: 102 MG/DL (ref 65–99)
HCT VFR BLD AUTO: 42.6 % (ref 42–47)
HGB BLD-MCNC: 13.5 G/DL (ref 14–18)
LYMPHOCYTES # BLD AUTO: 3.3 THOUSANDS/ΜL (ref 0.6–4.47)
LYMPHOCYTES NFR BLD AUTO: 35 % (ref 21–51)
MCH RBC QN AUTO: 26.8 PG (ref 26–34)
MCHC RBC AUTO-ENTMCNC: 31.7 G/DL (ref 31–37)
MCV RBC AUTO: 85 FL (ref 81–99)
MONOCYTES # BLD AUTO: 0.8 THOUSAND/ΜL (ref 0.17–1.22)
MONOCYTES NFR BLD AUTO: 8 % (ref 2–12)
NEUTROPHILS # BLD AUTO: 5.1 THOUSANDS/ΜL (ref 1.4–6.5)
NEUTS SEG NFR BLD AUTO: 55 % (ref 42–75)
PLATELET # BLD AUTO: 155 THOUSANDS/UL (ref 149–390)
PMV BLD AUTO: 10.1 FL (ref 8.6–11.7)
POTASSIUM SERPL-SCNC: 3.6 MMOL/L (ref 3.5–5.5)
RBC # BLD AUTO: 5.05 MILLION/UL (ref 4.3–5.9)
SALMONELLA DNA SPEC QL NAA+PROBE: NORMAL
SHIGA TOXIN STX GENE SPEC NAA+PROBE: NORMAL
SHIGELLA DNA SPEC QL NAA+PROBE: NORMAL
SODIUM SERPL-SCNC: 141 MMOL/L (ref 134–143)
WBC # BLD AUTO: 9.4 THOUSAND/UL (ref 4.8–10.8)
WBC STL QL MICRO: NORMAL

## 2020-02-18 PROCEDURE — 99232 SBSQ HOSP IP/OBS MODERATE 35: CPT | Performed by: NURSE PRACTITIONER

## 2020-02-18 PROCEDURE — 94760 N-INVAS EAR/PLS OXIMETRY 1: CPT

## 2020-02-18 PROCEDURE — 85025 COMPLETE CBC W/AUTO DIFF WBC: CPT | Performed by: NURSE PRACTITIONER

## 2020-02-18 PROCEDURE — 80048 BASIC METABOLIC PNL TOTAL CA: CPT | Performed by: INTERNAL MEDICINE

## 2020-02-18 PROCEDURE — 87205 SMEAR GRAM STAIN: CPT | Performed by: PHYSICIAN ASSISTANT

## 2020-02-18 PROCEDURE — 99232 SBSQ HOSP IP/OBS MODERATE 35: CPT | Performed by: INTERNAL MEDICINE

## 2020-02-18 PROCEDURE — 87505 NFCT AGENT DETECTION GI: CPT | Performed by: PHYSICIAN ASSISTANT

## 2020-02-18 PROCEDURE — 97535 SELF CARE MNGMENT TRAINING: CPT

## 2020-02-18 RX ORDER — POTASSIUM CHLORIDE 20MEQ/15ML
20 LIQUID (ML) ORAL 2 TIMES DAILY
Status: COMPLETED | OUTPATIENT
Start: 2020-02-18 | End: 2020-02-18

## 2020-02-18 RX ADMIN — BUDESONIDE AND FORMOTEROL FUMARATE DIHYDRATE 2 PUFF: 160; 4.5 AEROSOL RESPIRATORY (INHALATION) at 11:07

## 2020-02-18 RX ADMIN — SODIUM CHLORIDE 50 ML/HR: 0.9 INJECTION, SOLUTION INTRAVENOUS at 03:25

## 2020-02-18 RX ADMIN — APIXABAN 5 MG: 5 TABLET, FILM COATED ORAL at 17:16

## 2020-02-18 RX ADMIN — APIXABAN 5 MG: 5 TABLET, FILM COATED ORAL at 11:07

## 2020-02-18 RX ADMIN — BUDESONIDE AND FORMOTEROL FUMARATE DIHYDRATE 2 PUFF: 160; 4.5 AEROSOL RESPIRATORY (INHALATION) at 17:16

## 2020-02-18 RX ADMIN — POTASSIUM CHLORIDE 20 MEQ: 20 SOLUTION ORAL at 17:16

## 2020-02-18 RX ADMIN — POTASSIUM CHLORIDE 20 MEQ: 20 SOLUTION ORAL at 11:07

## 2020-02-18 RX ADMIN — OXYBUTYNIN CHLORIDE 15 MG: 5 TABLET, EXTENDED RELEASE ORAL at 22:19

## 2020-02-18 RX ADMIN — PRAVASTATIN SODIUM 80 MG: 40 TABLET ORAL at 17:16

## 2020-02-18 NOTE — OCCUPATIONAL THERAPY NOTE
02/18/20 1031   Restrictions/Precautions   Weight Bearing Precautions Per Order No   Other Precautions Contact/isolation;O2;Fall Risk;Hard of hearing   Pain Assessment   Pain Assessment No/denies pain   ADL   Where Assessed Other (Comment)  (sitting in bed )   Grooming Assistance 5  Supervision/Setup   Grooming Comments patient chose light UB care at this time, stating "I'm just waiting for the diarrhea to start    I eat something, and then that happens "  (patient wiped face and hands, and brushed dentures)   UB Bathing Comments declined at this time    UB Dressing Comments declined new hospital gown at this time   Toilet Transfers   Toilet Transfers Comments per nurse who was present for part of session, patient has been able to get to bathroom for toileting    Coordination   Gross Motor appears [facility]   Dexterity WFL   Cognition   Overall Cognitive Status [facility]   Arousal/Participation Alert; Cooperative   Comments patient states "I just want to get better - I don't know what they're going to do for me "    Assessment   Assessment Patient participated in Skilled OT session this date with interventions consisting of ADL re training with the use of correct body mechnaics   Patient agreeable to OT treatment session, upon arrival patient was found seated in bed (supported)  Patient requiring encouragement for participation and some set-up for self-care - reports still having bouts of diarrhea  Patient continues to be functioning below baseline level, occupational performance remains limited secondary to factors listed above and increased risk for falls and injury  From OT standpoint, recommendation at time of d/c would be return to [facility] with in-house therapies  Patient to benefit from continued Occupational Therapy treatment while in the hospital to address deficits as defined above and maximize level of functional independence with ADLs and functional mobility      Plan   Treatment Interventions Energy conservation; Activityengagement   Goal Expiration Date 02/27/20   OT Treatment Day [address] FAHE[address]or[address]/L

## 2020-02-18 NOTE — PLAN OF CARE
Problem: Potential for Falls  Goal: Patient will remain free of falls  Description  INTERVENTIONS:  - Assess patient frequently for physical needs  -  Identify cognitive and physical deficits and behaviors that affect risk of falls    -  Icard fall precautions as indicated by assessment   - Educate patient/family on patient safety including physical limitations  - Instruct patient to call for assistance with activity based on assessment  - Modify environment to reduce risk of injury  - Consider OT/PT consult to assist with strengthening/mobility  Outcome: Progressing     Problem: PAIN - ADULT  Goal: Verbalizes/displays adequate comfort level or baseline comfort level  Description  Interventions:  - Encourage patient to monitor pain and request assistance  - Assess pain using appropriate pain scale  - Administer analgesics based on type and severity of pain and evaluate response  - Implement non-pharmacological measures as appropriate and evaluate response  - Consider cultural and social influences on pain and pain management  - Notify physician/advanced practitioner if interventions unsuccessful or patient reports new pain  Outcome: Progressing     Problem: INFECTION - ADULT  Goal: Absence or prevention of progression during hospitalization  Description  INTERVENTIONS:  - Assess and monitor for signs and symptoms of infection  - Monitor lab/diagnostic results  - Monitor all insertion sites, i e  indwelling lines, tubes, and drains  - Monitor endotracheal if appropriate and nasal secretions for changes in amount and color  - Icard appropriate cooling/warming therapies per order  - Administer medications as ordered  - Instruct and encourage patient and family to use good hand hygiene technique  - Identify and instruct in appropriate isolation precautions for identified infection/condition  Outcome: Progressing  Goal: Absence of fever/infection during neutropenic period  Description  INTERVENTIONS:  - Monitor WBC    Outcome: Progressing     Problem: SAFETY ADULT  Goal: Maintain or return to baseline ADL function  Description  INTERVENTIONS:  -  Assess patient's ability to carry out ADLs; assess patient's baseline for ADL function and identify physical deficits which impact ability to perform ADLs (bathing, care of mouth/teeth, toileting, grooming, dressing, etc )  - Assess/evaluate cause of self-care deficits   - Assess range of motion  - Assess patient's mobility; develop plan if impaired  - Assess patient's need for assistive devices and provide as appropriate  - Encourage maximum independence but intervene and supervise when necessary  - Involve family in performance of ADLs  - Assess for home care needs following discharge   - Consider OT consult to assist with ADL evaluation and planning for discharge  - Provide patient education as appropriate  Outcome: Progressing  Goal: Maintain or return mobility status to optimal level  Description  INTERVENTIONS:  - Assess patient's baseline mobility status (ambulation, transfers, stairs, etc )    - Identify cognitive and physical deficits and behaviors that affect mobility  - Identify mobility aids required to assist with transfers and/or ambulation (gait belt, sit-to-stand, lift, walker, cane, etc )  - [place] fall precautions as indicated by assessment  - Record patient progress and toleration of activity level on Mobility SBAR; progress patient to next Phase/Stage  - Instruct patient to call for assistance with activity based on assessment  - Consider rehabilitation consult to assist with strengthening/weightbearing, etc   Outcome: Progressing     Problem: DISCHARGE PLANNING  Goal: Discharge to home or other facility with appropriate resources  Description  INTERVENTIONS:  - Identify barriers to discharge w/patient and caregiver  - Arrange for needed discharge resources and transportation as appropriate  - Identify discharge learning needs (meds, wound care, etc )  - Arrange for interpretive services to assist at discharge as needed  - Refer to Case Management Department for coordinating discharge planning if the patient needs post-hospital services based on physician/advanced practitioner order or complex needs related to functional status, cognitive ability, or social support system  Outcome: Progressing     Problem: Knowledge Deficit  Goal: Patient/family/caregiver demonstrates understanding of disease process, treatment plan, medications, and discharge instructions  Description  Complete learning assessment and assess knowledge base    Interventions:  - Provide teaching at level of understanding  - Provide teaching via preferred learning methods  Outcome: Progressing     Problem: Prexisting or High Potential for Compromised Skin Integrity  Goal: Skin integrity is maintained or improved  Description  INTERVENTIONS:  - Identify patients at risk for skin breakdown  - Assess and monitor skin integrity  - Assess and monitor nutrition and hydration status  - Monitor labs   - Assess for incontinence   - Turn and reposition patient  - Assist with mobility/ambulation  - Relieve pressure over bony prominences  - Avoid friction and shearing  - Provide appropriate hygiene as needed including keeping skin clean and dry  - Evaluate need for skin moisturizer/barrier cream  - Collaborate with interdisciplinary team   - Patient/family teaching  - Consider wound care consult   Outcome: Progressing

## 2020-02-18 NOTE — ASSESSMENT & PLAN NOTE
Wt Readings from Last 3 Encounters:   02/18/20 71 6 kg (157 lb 13 6 oz)   07/30/19 68 9 kg (152 lb)   01/25/19 68 kg (150 lb)     · Will continue pre-hospital Lopressor 25 mg p o  B i d ,   · Lasix is currently on hold secondary to acute kidney injury,   · will obtain daily weights

## 2020-02-18 NOTE — ASSESSMENT & PLAN NOTE
· Secondary to acute illness with volume depletion due to diarrhea, renal is on consult, we are avoiding nephrotoxic medications, patient had IVF and serial laboratory monitoring  · Likely prerenal secondary to volume depletion  · Hydrate with normal saline at 100 cc/hour - will discontinue to prevent CHF exacerbation  · Will hold pre-hospital Lasix  · Maintain chronic suprapubic pedraza catheter  · Consult Nephrology: volume depletion due to copious diarrhea

## 2020-02-18 NOTE — PROGRESS NOTES
Progress Note - Beka Phelan 11/15/1924, 80 y o  male MRN: [id number]    Unit/Bed#: -01 Encounter: [id number]    Primary Care Provider: Joslyn Richard   Date and time admitted to hospital: 2/16/2020  5:10 PM        * Diarrhea of presumed infectious origin  Assessment & Plan  · Will place on contact precautions  · Stool studies are currently pending    Acute renal failure with acute tubular necrosis superimposed on stage 3 chronic kidney disease ([facility] [address] )  Assessment & Plan  · Secondary to acute illness with volume depletion due to diarrhea, renal is on consult, we are avoiding nephrotoxic medications, patient had IVF and serial laboratory monitoring  · Likely prerenal secondary to volume depletion  · Hydrate with normal saline at 100 cc/hour - will discontinue to prevent CHF exacerbation  · Will hold pre-hospital Lasix  · Maintain chronic suprapubic pedraza catheter  · Consult Nephrology: volume depletion due to copious diarrhea  Chronic systolic CHF (congestive heart failure) ([facility])  Assessment & Plan  Wt Readings from Last 3 Encounters:   02/18/20 71 6 kg (157 lb 13 6 oz)   07/30/19 68 9 kg (152 lb)   01/25/19 68 kg (150 lb)     · Will continue pre-hospital Lopressor 25 mg p o  B i d ,   · Lasix is currently on hold secondary to acute kidney injury,   · will obtain daily weights        COPD (chronic obstructive pulmonary disease) ([facility])  Assessment & Plan  · Continue pre-hospital Symbicort 160/4 52 puffs b i d  And give Duo nebs duo nebs q 6 hours while awake  History of DVT (deep vein thrombosis)  Assessment & Plan  · Continue pre-hospital Eliquis 5 mg p o  Daily    Hypercholesterolemia  Assessment & Plan  · Substitute Pravachol 80 mg p o  Daily for pre-hospital Zocor    Pressure injury of skin of left thigh  Assessment & Plan  · POA  · Continue wound care per wound consult:   POA-healing pressure wound to the left lateral thigh/trochanter    Wound bed is beefy red, no drainage, intact scabbed area to the anterior area of the wound  Scarring noted to the periwound with hyperpigmented skin     Skin Care Plan:   1  Allevyn Life silicone bordered foam to the left lateral thigh/trochanter  2  EHOB waffle cushion to chair when OOB  3  Turn and reposition Q2 hours while in bed using green wedges to offload  4  Elevate heels with pillow to offload  5  Hydraguard to bilateral buttocks, heels and sacrum BID and PRN  6  Skin nourishing cream daily      VTE Pharmacologic Prophylaxis: Pharmacologic: Apixaban (Eliquis)    Patient Centered Rounds: I have performed bedside rounds with nursing staff today  Discussions with Specialists or Other Care Team Provider:  Nursing, PT OT, case management, Nephrology, wound care  Education and Discussions with Family / Patient:  Discussed with the patient and his children at the bedside    Current Length of Stay: 1 day(s)    Current Patient Status: Inpatient   Certification Statement: The patient will continue to require additional inpatient hospital stay due to Continuing management of kidney function, monitoring of stool studies  Discharge Plan:  Back to the [facility] when appropriate, potentially for 2020 or 2020  Code Status: Level 3 - DNAR and DNI    Subjective:   Patient is sitting in his bed  He has his hearing aids in so he can actually here today  He is awake alert and oriented and able to hold a full conversation  He states that he has not had any significant loose bowels since yesterday  He states that he had a very good appetite and ate most of his lunch  Objective:     Vitals:   Temp (24hrs), Av 4 °F (36 3 °C), Min:97 2 °F (36 2 °C), Max:97 6 °F (36 4 °C)    Temp:  [97 2 °F (36 2 °C)-97 6 °F (36 4 °C)] 97 6 °F (36 4 °C)  HR:  [66-93] 66  Resp:  [16-18] 18  BP: (120-149)/(61-70) 138/70  SpO2:  [94 %-96 %] 96 %  Body mass index is 21 41 kg/m²  Input and Output Summary (last 24 hours):        Intake/Output Summary (Last 24 hours) at 2/18/2020 1244  Last data filed at 2/18/2020 1242  Gross per 24 hour   Intake 360 ml   Output 475 ml   Net -115 ml       Physical Exam:     Physical Exam   Constitutional: He is oriented to person, place, and time  Vital signs are normal  He appears well-developed  He is cooperative  HENT:   Head: Normocephalic and atraumatic  Right Ear: Decreased hearing is noted  Left Ear: Decreased hearing is noted  Nose: Nose normal    Mouth/Throat: Oropharynx is clear and moist and mucous membranes are normal    Eyes: Conjunctivae and EOM are normal    Neck: Full passive range of motion without pain  Cardiovascular: Normal rate, regular rhythm and normal pulses  Murmur heard  Systolic murmur is present with a grade of 3/6  Pulmonary/Chest: Effort normal and breath sounds normal    Abdominal: Soft  Normal appearance and bowel sounds are normal    Genitourinary:   Genitourinary Comments: Suprapubic catheter, chronic   Musculoskeletal:   Roller walker   Neurological: He is alert and oriented to person, place, and time  Skin: Skin is warm  Psychiatric: He has a normal mood and affect  His speech is normal and behavior is normal        Additional Data:     Labs:    Results from last 7 days   Lab Units 02/18/20  0608   WBC Thousand/uL 9 40   HEMOGLOBIN g/dL 13 5*   HEMATOCRIT % 42 6   PLATELETS Thousands/uL 155   NEUTROS PCT % 55   LYMPHS PCT % 35   MONOS PCT % 8   EOS PCT % 1     Results from last 7 days   Lab Units 02/18/20  0608  02/16/20  1723   POTASSIUM mmol/L 3 6   < > 4 6   CHLORIDE mmol/L 107   < > 101   CO2 mmol/L 27   < > 27   BUN mg/dL 12   < > 24   CREATININE mg/dL 0 94   < > 1 57*   CALCIUM mg/dL 8 1*   < > 8 9   ALK PHOS U/L  --   --  57   ALT U/L  --   --  15   AST U/L  --   --  31    < > = values in this interval not displayed  * I Have Reviewed All Lab Data Listed Above  * Additional Pertinent Lab Tests Reviewed:  [facility]    Imaging:  Imaging Reports Reviewed Today Include:  None    Recent Cultures (last 7 days):           Last 24 Hours Medication List:     Current Facility-Administered Medications:  acetaminophen 650 mg Oral Q6H PRN Renetta Quill, BALDO   apixaban 5 mg Oral BID Renetta Quill, BALDO   budesonide-formoterol 2 puff Inhalation BID Renetta Quill, BALDO   ipratropium-albuterol 3 mL Nebulization Q6H PRN Cass Giraldo MD   ondansetron 4 mg Intravenous Q6H PRN Renetta Quill, BALDO   oxybutynin 15 mg Oral HS Renetta Quill, BALDO   potassium chloride 20 mEq Oral BID SEEMA Vasquez   pravastatin 80 mg Oral Daily With Drake Yancey PA-C        Today, Patient Was Seen By: SEEMA Henderson    ** Please Note: Dictation voice to text software may have been used in the creation of this document   **

## 2020-02-18 NOTE — PROGRESS NOTES
NEPHROLOGY PROGRESS NOTE   Smith Roberts 80 y o  male MRN: [id number]  Unit/Bed#: -01 Encounter: [id number]    Assessment/Plan:    1  Acute Kidney Injury: Present on Admission, Resolved  · Upon review of the medical record, baseline creatinine appears to be around 0 9-1 1 mg/dL dating back to 2018  Creatinine upon admission 1 57 mg/dL -> 0 94 mg/dL today  Back to baseline  · Etiology volume depletion second to renal hypoperfusion due to diarrhea and lasix use  Creatinine improving with crystalloid and abstinence from loop diuretics  · Renal US 2/17 normal echogenicity and contour  No hydronephrosis  · Stop IVF as creatinine back to baseline, patient with history of CdHF, eating and drinking well  · Hold lasix today, as patient still with diarrhea  Re-evaluate tomorrow  · Monitor IO, daily weight, BMPs, adjust meds to eGFR  · Avoid nephrotoxins, hypotension, wide variation in blood pressure  2  Stage 3 Chronic Kidney Disease   · Baseline as above  · Microalbumin/creatinine ratio 86 mg/g  3  Chronic Diastolic Heart Failure  · Lasix on hold per #1  · Appears compensated at this time  4  Diarrhea  · Stool studies including c  Diff pending  5  Urinary Retention  · Non-oliguric with indwelling urinary catheter  · Monitor for appropriate bladder decompression  6  COPD      HPI/24hr events:   80-year-old male with an active problem list significant for CKD 3, COPD, chronic diastolic heart failure on Lasix [facility]17/[facility] with a chief complaint diarrhea x5 days, 4-6 bowel movements per day  Renal consultation requested for acute kidney injury  24 hour events:  Creatinine improved to baseline with IV fluid administration and Lasix on hold  Will stop IV hydration at this point  ROS  Seen and examined sitting up in bed eating breakfast  "I've got the shakes " Denies chest pain, shortness of breath, dizziness, nausea, vomiting, dysuria   States he had one bout of diarrhea "first thing this morning "  A complete 10 point review of systems have been performed and are otherwise negative         Historical Information   Past Medical History:   Diagnosis Date    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     [facility] obstructive [facility]) [facility] [address] [facility]     Hypercholesteremia     Hyperlipidemia     [facility]     Phimosis     Stress incontinence     Urinary retention     Urinary urgency      Past Surgical History:   Procedure Laterality Date    BLADDER FULGURATION  2012, 2013    CIRCUMCISION, [place]  2013    PROSTATECTOMY  1995    TOTAL HIP ARTHROPLASTY Left 2012    URINARY SPHINCTER IMPLANT  1996    Repaired in 2002; Removed in 2012    UROFLOWMETRY SIMPLE / COMPLEX  1996     Social History   Social History     Substance and Sexual Activity   Alcohol Use Never    Frequency: Never     Social History     Substance and Sexual Activity   Drug Use No     Social History     Tobacco Use   Smoking Status Former Smoker    Packs/day: 0 50    Types: Cigarettes    Start date: 1947   Smokeless Tobacco Never Used       Family History:   Family History   Family history unknown: Yes       Medications:  Pertinent medications were reviewed    Current Facility-Administered Medications:  acetaminophen 650 mg Oral Q6H PRN Glee Rank, PA-C    apixaban 5 mg Oral BID Glee Rank, PA-C    budesonide-formoterol 2 puff Inhalation BID Glee Rank, PA-C    ipratropium-albuterol 3 mL Nebulization Q6H PRN Gisel Gonzalez MD    ondansetron 4 mg Intravenous Q6H PRN Glee Rank, PA-C    oxybutynin 15 mg Oral HS Glee Rank, PA-C    pravastatin 80 mg Oral Daily With [employer], PA-C    sodium chloride 50 mL/hr Intravenous Continuous Yeimi Diaz MD Last Rate: 50 mL/hr (02/18/20 0325)         Allergies   Allergen Reactions    Prednisone Shortness Of Breath    Ciprofloxacin Other (See Comments)         Vitals:   /70 (BP Location: Left arm)   Pulse 66   Temp 97 6 °F (36 4 °C) (Temporal)   Resp 18   Ht 6' (1 829 m)   Wt 71 6 kg (157 lb 13 6 oz)   SpO2 96%   BMI 21 41 kg/m²   Body mass index is 21 41 kg/m²  SpO2: 96 %,   SpO2 Activity: At Rest,   O2 Device: Nasal cannula      Intake/Output Summary (Last 24 hours) at 2/18/2020 1906  Last data filed at 2/18/2020 0535  Gross per 24 hour   Intake 120 ml   Output 475 ml   Net -355 ml     Invasive Devices     Peripheral Intravenous Line            Peripheral IV 02/16/20 Right Antecubital 1 day          Drain            Suprapubic Catheter 467 days                Physical Exam  General: conscious, cooperative, in no acute distress on nasal cannula  Eyes: conjunctivae pink, anicteric sclerae  ENT: lips and mucous membranes moist  Neck: supple, no JVD, no masses  Chest: diminished breath sounds bilateral, no crackles, ronchus or wheezings  CVS: S1 & S2, normal rate, regular rhythm  Abdomen: soft, non-tender, non-distended, normoactive bowel sounds  Extremities: trace edema of both legs  Skin: no rash, pale  Neuro: awake, alert, oriented      Diagnostic Data:  Lab: I have personally reviewed pertinent lab results  ,   CBC:  Results from last 7 days   Lab Units 02/18/20  0608   WBC Thousand/uL 9 40   HEMOGLOBIN g/dL 13 5*   HEMATOCRIT % 42 6   PLATELETS Thousands/uL 155      CMP: Lab Results   Component Value Date    SODIUM 141 02/18/2020    K 3 6 02/18/2020     02/18/2020    CO2 27 02/18/2020    BUN 12 02/18/2020    CREATININE 0 94 02/18/2020    CALCIUM 8 1 (L) 02/18/2020    EGFR 69 02/18/2020   ,   PT/INR: No results found for: PT, INR,   Magnesium: No components found for: MAG,  Phosphorous: No results found for: PHOS    Microbiology:  @LABRCNTIP,(urinecx:7)@        SEEMA Hawkins    Portions of the record may have been created with voice recognition software  Occasional wrong word or "sound a like" substitutions may have occurred due to the inherent limitations of voice recognition software   Read the chart carefully and recognize, using context, where substitutions have occurred

## 2020-02-18 NOTE — PLAN OF CARE
Problem: Potential for Falls  Goal: Patient will remain free of falls  Description  INTERVENTIONS:  - Assess patient frequently for physical needs  -  Identify cognitive and physical deficits and behaviors that affect risk of falls    -  [place] fall precautions as indicated by assessment   - Educate patient/family on patient safety including physical limitations  - Instruct patient to call for assistance with activity based on assessment  - Modify environment to reduce risk of injury  - Consider OT/PT consult to assist with strengthening/mobility  Outcome: Progressing     Problem: PAIN - ADULT  Goal: Verbalizes/displays adequate comfort level or baseline comfort level  Description  Interventions:  - Encourage patient to monitor pain and request assistance  - Assess pain using appropriate pain scale  - Administer analgesics based on type and severity of pain and evaluate response  - Implement non-pharmacological measures as appropriate and evaluate response  - Consider cultural and social influences on pain and pain management  - Notify physician/advanced practitioner if interventions unsuccessful or patient reports new pain  Outcome: Progressing     Problem: INFECTION - ADULT  Goal: Absence or prevention of progression during hospitalization  Description  INTERVENTIONS:  - Assess and monitor for signs and symptoms of infection  - Monitor lab/diagnostic results  - Monitor all insertion sites, i e  indwelling lines, tubes, and drains  - Monitor endotracheal if appropriate and nasal secretions for changes in amount and color  - [place] appropriate cooling/warming therapies per order  - Administer medications as ordered  - Instruct and encourage patient and family to use good hand hygiene technique  - Identify and instruct in appropriate isolation precautions for identified infection/condition  Outcome: Progressing  Goal: Absence of fever/infection during neutropenic period  Description  INTERVENTIONS:  - Monitor WBC    Outcome: Progressing     Problem: SAFETY ADULT  Goal: Maintain or return to baseline ADL function  Description  INTERVENTIONS:  -  Assess patient's ability to carry out ADLs; assess patient's baseline for ADL function and identify physical deficits which impact ability to perform ADLs (bathing, care of mouth/teeth, toileting, grooming, dressing, etc )  - Assess/evaluate cause of self-care deficits   - Assess range of motion  - Assess patient's mobility; develop plan if impaired  - Assess patient's need for assistive devices and provide as appropriate  - Encourage maximum independence but intervene and supervise when necessary  - Involve family in performance of ADLs  - Assess for home care needs following discharge   - Consider OT consult to assist with ADL evaluation and planning for discharge  - Provide patient education as appropriate  Outcome: Progressing  Goal: Maintain or return mobility status to optimal level  Description  INTERVENTIONS:  - Assess patient's baseline mobility status (ambulation, transfers, stairs, etc )    - Identify cognitive and physical deficits and behaviors that affect mobility  - Identify mobility aids required to assist with transfers and/or ambulation (gait belt, sit-to-stand, lift, walker, cane, etc )  - [place] fall precautions as indicated by assessment  - Record patient progress and toleration of activity level on Mobility SBAR; progress patient to next Phase/Stage  - Instruct patient to call for assistance with activity based on assessment  - Consider rehabilitation consult to assist with strengthening/weightbearing, etc   Outcome: Progressing     Problem: DISCHARGE PLANNING  Goal: Discharge to home or other facility with appropriate resources  Description  INTERVENTIONS:  - Identify barriers to discharge w/patient and caregiver  - Arrange for needed discharge resources and transportation as appropriate  - Identify discharge learning needs (meds, wound care, etc )  - Arrange for interpretive services to assist at discharge as needed  - Refer to Case Management Department for coordinating discharge planning if the patient needs post-hospital services based on physician/advanced practitioner order or complex needs related to functional status, cognitive ability, or social support system  Outcome: Progressing     Problem: Knowledge Deficit  Goal: Patient/family/caregiver demonstrates understanding of disease process, treatment plan, medications, and discharge instructions  Description  Complete learning assessment and assess knowledge base    Interventions:  - Provide teaching at level of understanding  - Provide teaching via preferred learning methods  Outcome: Progressing     Problem: Prexisting or High Potential for Compromised Skin Integrity  Goal: Skin integrity is maintained or improved  Description  INTERVENTIONS:  - Identify patients at risk for skin breakdown  - Assess and monitor skin integrity  - Assess and monitor nutrition and hydration status  - Monitor labs   - Assess for incontinence   - Turn and reposition patient  - Assist with mobility/ambulation  - Relieve pressure over bony prominences  - Avoid friction and shearing  - Provide appropriate hygiene as needed including keeping skin clean and dry  - Evaluate need for skin moisturizer/barrier cream  - Collaborate with interdisciplinary team   - Patient/family teaching  - Consider wound care consult   Outcome: Progressing

## 2020-02-19 VITALS
RESPIRATION RATE: 18 BRPM | OXYGEN SATURATION: 95 % | TEMPERATURE: 97 F | SYSTOLIC BLOOD PRESSURE: 138 MMHG | HEIGHT: 72 IN | DIASTOLIC BLOOD PRESSURE: 60 MMHG | HEART RATE: 97 BPM | BODY MASS INDEX: 22.19 KG/M2 | WEIGHT: 163.8 LBS

## 2020-02-19 LAB
ALBUMIN SERPL BCP-MCNC: 2.7 G/DL (ref 3.5–5.7)
ALP SERPL-CCNC: 49 U/L (ref 55–165)
ALT SERPL W P-5'-P-CCNC: 9 U/L (ref 7–52)
ANION GAP SERPL CALCULATED.3IONS-SCNC: 4 MMOL/L (ref 4–13)
AST SERPL W P-5'-P-CCNC: 17 U/L (ref 13–39)
BASOPHILS # BLD AUTO: 0 THOUSANDS/ΜL (ref 0–0.1)
BASOPHILS NFR BLD AUTO: 0 % (ref 0–2)
BILIRUB SERPL-MCNC: 0.8 MG/DL (ref 0.2–1)
BUN SERPL-MCNC: 10 MG/DL (ref 7–25)
CALCIUM SERPL-MCNC: 7.8 MG/DL (ref 8.6–10.5)
CHLORIDE SERPL-SCNC: 106 MMOL/L (ref 98–107)
CO2 SERPL-SCNC: 30 MMOL/L (ref 21–31)
CREAT SERPL-MCNC: 0.84 MG/DL (ref 0.7–1.3)
EOSINOPHIL # BLD AUTO: 0.2 THOUSAND/ΜL (ref 0–0.61)
EOSINOPHIL NFR BLD AUTO: 3 % (ref 0–5)
ERYTHROCYTE [DISTWIDTH] IN BLOOD BY AUTOMATED COUNT: 15.7 % (ref 11.5–14.5)
GFR SERPL CREATININE-BSD FRML MDRD: 74 ML/MIN/1.73SQ M
GLUCOSE SERPL-MCNC: 93 MG/DL (ref 65–99)
HCT VFR BLD AUTO: 41.6 % (ref 42–47)
HGB BLD-MCNC: 13.1 G/DL (ref 14–18)
LYMPHOCYTES # BLD AUTO: 3.3 THOUSANDS/ΜL (ref 0.6–4.47)
LYMPHOCYTES NFR BLD AUTO: 39 % (ref 21–51)
MAGNESIUM SERPL-MCNC: 1.6 MG/DL (ref 1.9–2.7)
MCH RBC QN AUTO: 26.5 PG (ref 26–34)
MCHC RBC AUTO-ENTMCNC: 31.4 G/DL (ref 31–37)
MCV RBC AUTO: 84 FL (ref 81–99)
MONOCYTES # BLD AUTO: 0.7 THOUSAND/ΜL (ref 0.17–1.22)
MONOCYTES NFR BLD AUTO: 8 % (ref 2–12)
NEUTROPHILS # BLD AUTO: 4.1 THOUSANDS/ΜL (ref 1.4–6.5)
NEUTS SEG NFR BLD AUTO: 50 % (ref 42–75)
PHOSPHATE SERPL-MCNC: 2.3 MG/DL (ref 3–5.5)
PLATELET # BLD AUTO: 140 THOUSANDS/UL (ref 149–390)
PMV BLD AUTO: 10.9 FL (ref 8.6–11.7)
POTASSIUM SERPL-SCNC: 4 MMOL/L (ref 3.5–5.5)
PROT SERPL-MCNC: 5.7 G/DL (ref 6.4–8.9)
RBC # BLD AUTO: 4.93 MILLION/UL (ref 4.3–5.9)
SODIUM SERPL-SCNC: 140 MMOL/L (ref 134–143)
WBC # BLD AUTO: 8.3 THOUSAND/UL (ref 4.8–10.8)

## 2020-02-19 PROCEDURE — 80053 COMPREHEN METABOLIC PANEL: CPT | Performed by: NURSE PRACTITIONER

## 2020-02-19 PROCEDURE — 85025 COMPLETE CBC W/AUTO DIFF WBC: CPT | Performed by: NURSE PRACTITIONER

## 2020-02-19 PROCEDURE — 84100 ASSAY OF PHOSPHORUS: CPT | Performed by: NURSE PRACTITIONER

## 2020-02-19 PROCEDURE — 99233 SBSQ HOSP IP/OBS HIGH 50: CPT | Performed by: INTERNAL MEDICINE

## 2020-02-19 PROCEDURE — 99239 HOSP IP/OBS DSCHRG MGMT >30: CPT | Performed by: NURSE PRACTITIONER

## 2020-02-19 PROCEDURE — 83735 ASSAY OF MAGNESIUM: CPT | Performed by: NURSE PRACTITIONER

## 2020-02-19 RX ORDER — FUROSEMIDE 20 MG/1
20 TABLET ORAL DAILY PRN
Status: DISCONTINUED | OUTPATIENT
Start: 2020-02-19 | End: 2020-02-19 | Stop reason: HOSPADM

## 2020-02-19 RX ORDER — LOPERAMIDE HYDROCHLORIDE 2 MG/1
2 CAPSULE ORAL 4 TIMES DAILY PRN
Qty: 30 CAPSULE | Refills: 0
Start: 2020-02-19

## 2020-02-19 RX ORDER — POTASSIUM CHLORIDE 750 MG/1
10 TABLET, EXTENDED RELEASE ORAL 2 TIMES DAILY
Qty: 60 TABLET | Refills: 0 | Status: SHIPPED | OUTPATIENT
Start: 2020-02-19 | End: 2020-03-20

## 2020-02-19 RX ORDER — POTASSIUM CHLORIDE 750 MG/1
10 TABLET, EXTENDED RELEASE ORAL DAILY
Status: DISCONTINUED | OUTPATIENT
Start: 2020-02-19 | End: 2020-02-19 | Stop reason: HOSPADM

## 2020-02-19 RX ORDER — FUROSEMIDE 20 MG/1
20 TABLET ORAL DAILY PRN
Qty: 30 TABLET | Refills: 0 | Status: SHIPPED | OUTPATIENT
Start: 2020-02-19 | End: 2020-12-01 | Stop reason: HOSPADM

## 2020-02-19 RX ORDER — MECLIZINE HYDROCHLORIDE 25 MG/1
25 TABLET ORAL
Qty: 30 TABLET | Refills: 0
Start: 2020-02-19

## 2020-02-19 RX ORDER — FUROSEMIDE 40 MG/1
40 TABLET ORAL
Status: DISCONTINUED | OUTPATIENT
Start: 2020-02-19 | End: 2020-02-19 | Stop reason: HOSPADM

## 2020-02-19 RX ORDER — DOCUSATE SODIUM 100 MG/1
100 CAPSULE, LIQUID FILLED ORAL 2 TIMES DAILY PRN
Qty: 10 CAPSULE | Refills: 0
Start: 2020-02-19

## 2020-02-19 RX ADMIN — POTASSIUM & SODIUM PHOSPHATES POWDER PACK 280-160-250 MG 1 PACKET: 280-160-250 PACK at 08:57

## 2020-02-19 RX ADMIN — POTASSIUM CHLORIDE 10 MEQ: 750 TABLET, EXTENDED RELEASE ORAL at 08:57

## 2020-02-19 RX ADMIN — FUROSEMIDE 40 MG: 40 TABLET ORAL at 08:57

## 2020-02-19 RX ADMIN — BUDESONIDE AND FORMOTEROL FUMARATE DIHYDRATE 2 PUFF: 160; 4.5 AEROSOL RESPIRATORY (INHALATION) at 08:57

## 2020-02-19 RX ADMIN — APIXABAN 5 MG: 5 TABLET, FILM COATED ORAL at 08:57

## 2020-02-19 RX ADMIN — MAGNESIUM GLUCONATE 500 MG ORAL TABLET 400 MG: 500 TABLET ORAL at 08:57

## 2020-02-19 NOTE — DISCHARGE SUMMARY
Discharge- Kimani Alejandro 11/15/1924, 80 y o  male MRN: [id number]    Unit/Bed#: -01 Encounter: [id number]    Primary Care Provider: Solomon Meléndez   Date and time admitted to hospital: 2/16/2020  5:10 PM        * Diarrhea of presumed infectious origin  Assessment & Plan  · Will place on contact precautions  · Stool studies are negative  · All diarrhea has resolved    Acute renal failure with acute tubular necrosis superimposed on stage 3 chronic kidney disease ([facility] [address] )  Assessment & Plan  · Secondary to acute illness with volume depletion due to diarrhea, renal is on consult, we are avoiding nephrotoxic medications, patient had IVF and serial laboratory monitoring  · Likely prerenal secondary to volume depletion  · Hydrate with normal saline at 100 cc/hour - will discontinue to prevent CHF exacerbation  · Will resume Lasix with added prn lasix dose  · Maintain chronic suprapubic pedraza catheter  · Consult Nephrology: volume depletion due to copious diarrhea  · Returned to baseline      Chronic systolic CHF (congestive heart failure) ([facility])  Assessment & Plan  Wt Readings from Last 3 Encounters:   02/19/20 74 3 kg (163 lb 12 8 oz)   07/30/19 68 9 kg (152 lb)   01/25/19 68 kg (150 lb)     · Will continue pre-hospital Lopressor 25 mg p o  B i d ,   · Lasix is currently on hold secondary to acute kidney injury, will be restarted on discharge with prn dose for increased weight  · will obtain daily weights        COPD (chronic obstructive pulmonary disease) ([facility])  Assessment & Plan  · Continue pre-hospital Symbicort 160/4 52 puffs b i d  And give Duo nebs duo nebs q 6 hours while awake  History of DVT (deep vein thrombosis)  Assessment & Plan  · Continue pre-hospital Eliquis 5 mg p o  Daily    Hypercholesterolemia  Assessment & Plan  · Substitute Pravachol 80 mg p o   Daily for pre-hospital Zocor    Pressure injury of skin of left thigh  Assessment & Plan  · POA  · Continue wound care per wound consult: POA-healing pressure wound to the left lateral thigh/trochanter  Wound bed is beefy red, no drainage, intact scabbed area to the anterior area of the wound  Scarring noted to the periwound with hyperpigmented skin     Skin Care Plan:   1  Allevyn Life silicone bordered foam to the left lateral thigh/trochanter  2  EHOB waffle cushion to chair when OOB  3  Turn and reposition Q2 hours while in bed using green wedges to offload  4  Elevate heels with pillow to offload  5  Hydraguard to bilateral buttocks, heels and sacrum BID and PRN  6  Skin nourishing cream daily      Discharging Physician / Practitioner: SEEMA Escobar  PCP: Deshaun Hylton  Admission Date:   Admission Orders (From admission, onward)     Ordered        02/17/20 1453  Inpatient Admission  Once         02/16/20 1951  Place in Observation  Once                   Discharge Date: 02/19/20    Resolved Problems  Date Reviewed: 2/19/2020    None          Consultations During Hospital Stay:  · Case management  · Nephrology   · PT/OT    Procedures Performed:   · CT ABDOMEN AND PELVIS WITHOUT IV CONTRAST     INDICATION:   N/V/D, 79 yo  colitis?       COMPARISON:  None      TECHNIQUE:  CT examination of the abdomen and pelvis was performed without intravenous contrast   Axial, sagittal, and coronal 2D reformatted images were created from the source data and submitted for interpretation       Radiation dose length product (DLP) for this visit:  320 9 mGy-cm   This examination, like all CT scans performed in the [facility], was performed utilizing techniques to minimize radiation dose exposure, including the use of iterative   reconstruction and automated exposure control       Enteric contrast was not administered       FINDINGS:     ABDOMEN     LOWER CHEST:  Cardiomegaly  Heavy coronary artery and mitral annular calcification  No pericardial effusion    Nonspecific interstitial changes of the lung bases      LIVER/BILIARY TREE: Unremarkable      GALLBLADDER:  Cholelithiasis      SPLEEN: Normal size  Multiple rounded hypoattenuating lesions measuring up to 2 8 cm in diameter, incompletely characterized      PANCREAS:  Moderate parenchymal atrophy  No focal lesion or main ductal dilation      ADRENAL GLANDS:  Unremarkable      KIDNEYS/URETERS:  3 mm right renal vascular calcification or nonobstructing calculus  No hydronephrosis  Left renal lower pole 1 5 cm low-attenuation lesion, incompletely characterized      STOMACH AND BOWEL:  No disproportionate dilation of small or large bowel loops  Colonic diverticulosis without evidence of acute diverticulitis within the visualized portion, noting that short segment of the sigmoid is obscured by intense streak   artifact  Watery stool in the colon is consistent with diarrheal state      APPENDIX:  No findings to suggest appendicitis      ABDOMINOPELVIC CAVITY:  No ascites or free intraperitoneal air  No lymphadenopathy      VESSELS:  Severe atherosclerotic calcification of the aorta and its major abdominal branches     4 0 cm infrarenal abdominal aortic aneurysm  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 measuring 1 4-1 5 cm in diameter      PELVIS     REPRODUCTIVE ORGANS:  Status post prostatectomy  The surgical bed is partially obscured by streak artifact      URINARY BLADDER:  Partially obscured  Decompressed around a suprapubic catheter and poorly evaluated     ABDOMINAL WALL/INGUINAL REGIONS: Bilateral subcutaneous calcified granulomas within the bilateral buttocks      OSSEOUS STRUCTURES:  No acute fracture or destructive osseous lesion  Left hip arthroplasty    Diffuse bony demineralization         IMPRESSION:     No definitive acute abnormality in the abdomen or pelvis      Colonic diverticulosis without evidence of diverticulitis, noting that a short segment of the sigmoid colon is obscured by intense streak artifact from left hip arthroplasty      Watery stool in the colon is consistent with diarrheal state  CT might not be sensitive for low-grade enterocolitis      4 0 cm infrarenal abdominal aortic aneurysm      Additional chronic findings as described  · RENAL ULTRASOUND     INDICATION:   tena      COMPARISON: CT abdomen/pelvis 2/16/2020     TECHNIQUE:   Ultrasound of the retroperitoneum was performed with a curvilinear transducer utilizing volumetric sweeps and still imaging techniques       FINDINGS:     KIDNEYS:  Symmetric and normal size  Right kidney:  10 4 x 4 cm  Left kidney:  10 3 x 4 3 cm      Right kidney  Normal echogenicity and contour  No suspicious masses detected  No hydronephrosis  No shadowing calculi  No perinephric fluid collections      Left kidney  Normal echogenicity and contour  No suspicious masses detected  1 8 cm cyst with a thin septation in the lower pole  No hydronephrosis  No shadowing calculi  No perinephric fluid collections      URETERS:  Nonvisualized      BLADDER:   Collapsed around a Acevedo catheter        IMPRESSION:     No hydronephrosis  Significant Findings / Test Results:   Salmonella sp PCR None Detected None Detected    Shigella sp/Enteroinvasive E  coli (EIEC) PCR None Detected None Detected    Campylobacter sp (jejuni and coli) PCR None Detected None Detected    Shiga toxin 1/Shiga toxin 2 genes PCR None Detected None Detected      Fecal Leukocytes No WBC's No WBC's        Incidental Findings:   · none     Test Results Pending at Discharge (will require follow up):   · none     Outpatient Tests Requested:  · none    Complications:     none    Reason for Admission: Diarrhea times 5 days    Hospital Course:     Camila Hernandez is a 80 y o  male patient who originally presented to the hospital on 2/16/2020 due to diarrhea x5 days  Patient reports over the past 5 days he has had multiple episodes of diarrhea approximately 4-6 times per day which is watery in nature and not accompanied with any blood or mucus  Patient denies any abdominal pain, no fever chills, no recent sick contacts were bad food exposure  Patient denies any recent antibiotic usage  Patient did have slight elevation in creatinine during his admission, more due to dehydration from volume depletion from diarrhea  This has resolved  Patient has had increased weight during this admission as the patient's lasix had been held due to the acute kidney injury  Patient will be restarted on his home lasix dose with the addition of a prn lasix dose for weights greater than 3 pounds over his normal baseline weight  Patient will also be given potassium supplements and should have repeat blood work in 1 week  Please see above list of diagnoses and related plan for additional information  Condition at Discharge: fair     Discharge Day Visit / Exam:     Subjective:  Patient is lying in bed  He states he feels well enough to go home  Patient is appropriate for discharge home  Vitals: Blood Pressure: 138/60 (02/19/20 0717)  Pulse: 97 (02/19/20 0717)  Temperature: (!) 97 °F (36 1 °C) (02/19/20 0717)  Temp Source: Temporal (02/19/20 0717)  Respirations: 18 (02/19/20 0717)  Height: 6' (182 9 cm) (02/16/20 2030)  Weight - Scale: 74 3 kg (163 lb 12 8 oz) (02/19/20 0550)  SpO2: 95 % (02/19/20 0717)  Exam:   Physical Exam   Constitutional: He is oriented to person, place, and time  Vital signs are normal  He appears well-developed  He is cooperative  HENT:   Head: Normocephalic and atraumatic  Right Ear: Decreased hearing is noted  Left Ear: Decreased hearing is noted  Nose: Nose normal    Mouth/Throat: Oropharynx is clear and moist and mucous membranes are normal    Eyes: Conjunctivae and EOM are normal    Neck: Full passive range of motion without pain  Cardiovascular: Normal rate, regular rhythm and normal pulses  Murmur heard  Systolic murmur is present with a grade of 3/6    Pulmonary/Chest: Effort normal and breath sounds normal  Abdominal: Soft  Normal appearance and bowel sounds are normal    Denies any loose stools   Genitourinary:   Genitourinary Comments: Chronic suprapubic catheter   Musculoskeletal:   With roller walker   Neurological: He is alert and oriented to person, place, and time  Skin: Skin is warm  Psychiatric: He has a normal mood and affect  His speech is normal and behavior is normal        Discussion with Family: discussed with the patient and associate with [facility]  Discharge instructions/Information to patient and family:   See after visit summary for information provided to patient and family  Provisions for Follow-Up Care:  See after visit summary for information related to follow-up care and any pertinent home health orders  Disposition:     Home with VNA Services (Reminder: Complete face to face encounter)    For Discharges to [facility]:   · Not Applicable to this Patient - Not Applicable to this Patient    Planned Readmission:    no     Discharge Statement:  I spent 35 minutes discharging the patient  This time was spent on the day of discharge  I had direct contact with the patient on the day of discharge  Greater than 50% of the total time was spent examining patient, answering all patient questions, arranging and discussing plan of care with patient as well as directly providing post-discharge instructions  Additional time then spent on discharge activities  Discharge Medications:  See after visit summary for reconciled discharge medications provided to patient and family        ** Please Note: This note has been constructed using a voice recognition system **

## 2020-02-19 NOTE — PLAN OF CARE
Problem: Potential for Falls  Goal: Patient will remain free of falls  Description  INTERVENTIONS:  - Assess patient frequently for physical needs  -  Identify cognitive and physical deficits and behaviors that affect risk of falls    -  [place] fall precautions as indicated by assessment   - Educate patient/family on patient safety including physical limitations  - Instruct patient to call for assistance with activity based on assessment  - Modify environment to reduce risk of injury  - Consider OT/PT consult to assist with strengthening/mobility  Outcome: Progressing     Problem: PAIN - ADULT  Goal: Verbalizes/displays adequate comfort level or baseline comfort level  Description  Interventions:  - Encourage patient to monitor pain and request assistance  - Assess pain using appropriate pain scale  - Administer analgesics based on type and severity of pain and evaluate response  - Implement non-pharmacological measures as appropriate and evaluate response  - Consider cultural and social influences on pain and pain management  - Notify physician/advanced practitioner if interventions unsuccessful or patient reports new pain  Outcome: Progressing     Problem: INFECTION - ADULT  Goal: Absence or prevention of progression during hospitalization  Description  INTERVENTIONS:  - Assess and monitor for signs and symptoms of infection  - Monitor lab/diagnostic results  - Monitor all insertion sites, i e  indwelling lines, tubes, and drains  - Monitor endotracheal if appropriate and nasal secretions for changes in amount and color  - [place] appropriate cooling/warming therapies per order  - Administer medications as ordered  - Instruct and encourage patient and family to use good hand hygiene technique  - Identify and instruct in appropriate isolation precautions for identified infection/condition  Outcome: Progressing  Goal: Absence of fever/infection during neutropenic period  Description  INTERVENTIONS:  - Monitor WBC    Outcome: Progressing

## 2020-02-19 NOTE — SOCIAL WORK
I spoke with Leanne Lema at [facility] and she stated pt is active, I was sent to intake and they stated he is not active, cm will continue to follow and if a new hhc is needed I will call son for choices, son was called at 12;32 AM  And he was made aware that the pt needs to be assessed byLori before he can return, I did speak to someone at [facility] and he was active with them and he needs a new start, intake is aware, pt will return wit [employer] and rn,

## 2020-02-19 NOTE — PLAN OF CARE
Problem: DISCHARGE PLANNING - CARE MANAGEMENT  Goal: Discharge to post-acute care or home with appropriate resources  Description  INTERVENTIONS:  - Conduct assessment to determine patient/family and health care team treatment goals, and need for post-acute services based on payer coverage, community resources, and patient preferences, and barriers to discharge  - Address psychosocial, clinical, and financial barriers to discharge as identified in assessment in conjunction with the patient/family and health care team  - Arrange appropriate level of post-acute services according to patient's   needs and preference and payer coverage in collaboration with the physician and health care team  - Communicate with and update the patient/family, physician, and health care team regarding progress on the discharge plan  - Arrange appropriate transportation to post-acute venues    Pt to be d/c to [facility] with [facility]   Outcome: Completed

## 2020-02-19 NOTE — SOCIAL WORK
I called the [facility] to make them aware of the tentative d/c for today, pt does not have hhc as I was told, pt is not active with [facility], I was told by Ольга Cote and Hieu Crisostomo that the pt needs to be assessed before he return back, they will come to assess the pt today

## 2020-02-19 NOTE — DISCHARGE INSTR - AVS FIRST PAGE
PLEASE OBTAIN DAILY WEIGHT USING THE SAME EQUIPMENT TO PROVIDE MOST ACCURATE WEIGHT FOR MEDICATION ADJUSTMENTS  THANK YOU  WOUND CARE INSTRUCTIONS:  POA-healing pressure wound to the left lateral thigh/trochanter  Wound bed is beefy red, no drainage, intact scabbed area to the anterior area of the wound  Scarring noted to the periwound with hyperpigmented skin     Skin Care Plan:   1  Allevyn Life silicone bordered foam to the left lateral thigh/trochanter  2  EHOB waffle cushion to chair when OOB  3  Turn and reposition Q2 hours while in bed using green wedges to offload  4  Elevate heels with pillow to offload  5  Hydraguard to bilateral buttocks, heels and sacrum BID and PRN  6   Skin nourishing cream daily

## 2020-02-19 NOTE — ASSESSMENT & PLAN NOTE
Wt Readings from Last 3 Encounters:   02/19/20 74 3 kg (163 lb 12 8 oz)   07/30/19 68 9 kg (152 lb)   01/25/19 68 kg (150 lb)     · Will continue pre-hospital Lopressor 25 mg p o  B i d ,   · Lasix is currently on hold secondary to acute kidney injury, will be restarted on discharge with prn dose for increased weight  · will obtain daily weights

## 2020-02-19 NOTE — NURSING NOTE
Pt discharged home to the [facility] with [address]  dsicharge instructions faxed to facility and reviewed with pt and son as requested by pt  Vss  Pt denies pain, denies shortness of breath  IV removed without complications and tip intact  All belongings with pt  Son transporting pt home  Pt escorted to front entrance and assisted into car of son

## 2020-02-19 NOTE — SOCIAL WORK
I called the son back at 11:05 to make him aware the pt is able to return to the [facility], he will transport the pt and he will bring his portable oxygen tank, pt dept did agree with the mode of transport, d/c instructions were faxed to [facility], pt and family in agreement with the d/c an d/c plan back to the [facility] and [facility]

## 2020-02-19 NOTE — ASSESSMENT & PLAN NOTE
· Secondary to acute illness with volume depletion due to diarrhea, renal is on consult, we are avoiding nephrotoxic medications, patient had IVF and serial laboratory monitoring  · Likely prerenal secondary to volume depletion  · Hydrate with normal saline at 100 cc/hour - will discontinue to prevent CHF exacerbation  · Will resume Lasix with added prn lasix dose  · Maintain chronic suprapubic pedraza catheter  · Consult Nephrology: volume depletion due to copious diarrhea    · Returned to baseline

## 2020-02-19 NOTE — PROGRESS NOTES
Progress Note - Nephrology   Bull Primes 80 y o  male MRN: [id number]  Unit/Bed#: -01 Encounter: [id number]    A/P:  1  Acute kidney injury atop chronic kidney disease   Creatinine appears to be at baseline, patient status post volume resuscitation  2  Chronic kidney disease stage 3 with baseline creatinine around 0 8-0 9 mg/dL  3  Mild proteinuria   Patient's have re-evaluation once he achieves typical clinical status  If proteinuria remains elevated, consider further workup if needed  4  Hypomagnesemia   Will start the patient on magnesium oxide 400 mg twice a day for now, look to decrease to once a day  Continue monitor magnesium levels   5  Chronic diastolic congestive heart failure   Patient currently compensated, loop diuretics currently on hold     6  Diarrhea    Stool studies are pending, this far those that have been completed are negative/normal       Follow up reason for today's visit:  Acute kidney injury/chronic kidney disease/electrolyte disorders    Diarrhea of presumed infectious origin    Patient Active Problem List   Diagnosis    [facility] obstructive [facility]) [facility] [address] [facility]    Chronic indwelling Acevedo catheter    Abdominal aneurysm ([facility] [address] [facility]    Allergic rhinitis    Aortic stenosis    Diverticulosis of colon    History of DVT (deep vein thrombosis)    History of total hip replacement    Hypercholesterolemia    Impaired fasting glucose    Malignant neoplasm of prostate ([address] )    Meniere's disease    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 ([facility] [address] [facility]    Occlusion and stenosis of carotid artery without mention of cerebral infarction    Spondylosis    Panlobular emphysema ([address]    Retention of urine    Chronic systolic CHF (congestive heart failure) (HCC)    Diarrhea of presumed infectious origin    Acute renal failure with acute tubular necrosis superimposed on stage 3 chronic kidney disease (HCC)    Pressure injury of skin of left thigh Subjective:   No Acute events overnight    Objective:     Vitals: Blood pressure 138/60, pulse 97, temperature (!) 97 °F (36 1 °C), temperature source Temporal, resp  rate 18, height 6' (1 829 m), weight 74 3 kg (163 lb 12 8 oz), SpO2 95 %  ,Body mass index is 22 22 kg/m²  Weight (last 2 days)     Date/Time   Weight    02/19/20 0550   74 3 (163 8)    02/18/20 0600   71 6 (157 85)    02/17/20 0600   69 4 (153)                Intake/Output Summary (Last 24 hours) at 2/19/2020 0826  Last data filed at 2/18/2020 1700  Gross per 24 hour   Intake 480 ml   Output    Net 480 ml     I/O last 3 completed shifts: In: 480 [P O :480]  Out: 100 [Urine:100]         Physical Exam: /60 (BP Location: Left arm)   Pulse 97   Temp (!) 97 °F (36 1 °C) (Temporal)   Resp 18   Ht 6' (1 829 m)   Wt 74 3 kg (163 lb 12 8 oz)   SpO2 95%   BMI 22 22 kg/m²     General Appearance:    Alert, cooperative, no distress, appears stated age   Head:    Normocephalic, without obvious abnormality, atraumatic   Eyes:    Conjunctiva/corneas clear   Ears:    Normal external ears   Nose:   Nares normal, septum midline, mucosa normal, no drainage    or sinus tenderness   Throat:   Lips, mucosa, and tongue normal; teeth and gums normal   Neck:   Supple   Back:     Symmetric, no curvature, ROM normal, no CVA tenderness   Lungs:     Clear to auscultation bilaterally, respirations unlabored   Chest wall:    No tenderness or deformity   Heart:    Regular rate and rhythm, S1 and S2 normal, no murmur, rub   or gallop   Abdomen:     Soft, non-tender, bowel sounds active   Extremities:   Extremities normal, atraumatic, no cyanosis or edema   Skin:   Skin color, texture, turgor normal, no rashes or lesions   Lymph nodes:   Cervical normal   Neurologic:   CNII-XII intact            Lab, Imaging and other studies: I have personally reviewed pertinent labs    CBC:   Lab Results   Component Value Date    WBC 8 30 02/19/2020    HGB 13 1 (L) 02/19/2020 HCT 41 6 (L) 02/19/2020    MCV 84 02/19/2020     (L) 02/19/2020    MCH 26 5 02/19/2020    MCHC 31 4 02/19/2020    RDW 15 7 (H) 02/19/2020    MPV 10 9 02/19/2020     CMP:   Lab Results   Component Value Date    K 4 0 02/19/2020     02/19/2020    CO2 30 02/19/2020    BUN 10 02/19/2020    CREATININE 0 84 02/19/2020    CALCIUM 7 8 (L) 02/19/2020    AST 17 02/19/2020    ALT 9 02/19/2020    ALKPHOS 49 (L) 02/19/2020    EGFR 74 02/19/2020         Results from last 7 days   Lab Units 02/19/20  0507 02/18/20  0608 02/17/20  0544 02/16/20  1723   POTASSIUM mmol/L 4 0 3 6 3 7 4 6   CHLORIDE mmol/L 106 107 107 101   CO2 mmol/L 30 27 25 27   BUN mg/dL 10 12 19 24   CREATININE mg/dL 0 84 0 94 1 12 1 57*   CALCIUM mg/dL 7 8* 8 1* 7 9* 8 9   ALK PHOS U/L 49*  --   --  57   ALT U/L 9  --   --  15   AST U/L 17  --   --  31         Phosphorus:   Lab Results   Component Value Date    PHOS 2 3 (L) 02/19/2020     Magnesium:   Lab Results   Component Value Date    MG 1 6 (L) 02/19/2020     Urinalysis: No results found for: Tip Monster, SPECGRAV, PHUR, LEUKOCYTESUR, NITRITE, PROTEINUA, GLUCOSEU, KETONESU, BILIRUBINUR, BLOODU  Ionized Calcium: No results found for: CAION  Coagulation: No results found for: PT, INR, APTT  Troponin: No results found for: TROPONINI  ABG: No results found for: PHART, HVN1CIA, PO2ART, MGR3ZRH, F4KZMQUU, BEART, SOURCE  Radiology review:     IMAGING  Procedure: Ct Abdomen Pelvis Wo Contrast    Result Date: 2/16/2020  Narrative: CT ABDOMEN AND PELVIS WITHOUT IV CONTRAST INDICATION:   N/V/D, 81 yo  colitis?   COMPARISON:  None  TECHNIQUE:  CT examination of the abdomen and pelvis was performed without intravenous contrast   Axial, sagittal, and coronal 2D reformatted images were created from the source data and submitted for interpretation  Radiation dose length product (DLP) for this visit:  320 9 mGy-cm     This examination, like all CT scans performed in the [facility], was performed utilizing techniques to minimize radiation dose exposure, including the use of iterative reconstruction and automated exposure control  Enteric contrast was not administered  FINDINGS: ABDOMEN LOWER CHEST:  Cardiomegaly  Heavy coronary artery and mitral annular calcification  No pericardial effusion  Nonspecific interstitial changes of the lung bases  LIVER/BILIARY TREE:  Unremarkable  GALLBLADDER:  Cholelithiasis  SPLEEN: Normal size  Multiple rounded hypoattenuating lesions measuring up to 2 8 cm in diameter, incompletely characterized  PANCREAS:  Moderate parenchymal atrophy  No focal lesion or main ductal dilation  ADRENAL GLANDS:  Unremarkable  KIDNEYS/URETERS:  3 mm right renal vascular calcification or nonobstructing calculus  No hydronephrosis  Left renal lower pole 1 5 cm low-attenuation lesion, incompletely characterized  STOMACH AND BOWEL:  No disproportionate dilation of small or large bowel loops  Colonic diverticulosis without evidence of acute diverticulitis within the visualized portion, noting that short segment of the sigmoid is obscured by intense streak artifact  Watery stool in the colon is consistent with diarrheal state  APPENDIX:  No findings to suggest appendicitis  ABDOMINOPELVIC CAVITY:  No ascites or free intraperitoneal air  No lymphadenopathy  VESSELS:  Severe atherosclerotic calcification of the aorta and its major abdominal branches     4 0 cm infrarenal abdominal aortic aneurysm  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 measuring 1 4-1 5 cm in diameter  PELVIS REPRODUCTIVE ORGANS:  Status post prostatectomy  The surgical bed is partially obscured by streak artifact  URINARY BLADDER:  Partially obscured  Decompressed around a suprapubic catheter and poorly evaluated ABDOMINAL WALL/INGUINAL REGIONS: Bilateral subcutaneous calcified granulomas within the bilateral buttocks  OSSEOUS STRUCTURES:  No acute fracture or destructive osseous lesion    Left hip arthroplasty  Diffuse bony demineralization  Impression: No definitive acute abnormality in the abdomen or pelvis  Colonic diverticulosis without evidence of diverticulitis, noting that a short segment of the sigmoid colon is obscured by intense streak artifact from left hip arthroplasty  Watery stool in the colon is consistent with diarrheal state  CT might not be sensitive for low-grade enterocolitis  4 0 cm infrarenal abdominal aortic aneurysm  Additional chronic findings as described  Workstation performed: [id number]     Procedure: Us Kidney And Bladder    Result Date: 2/18/2020  Narrative: RENAL ULTRASOUND INDICATION:   tena  COMPARISON: CT abdomen/pelvis 2/16/2020 TECHNIQUE:   Ultrasound of the retroperitoneum was performed with a curvilinear transducer utilizing volumetric sweeps and still imaging techniques  FINDINGS: KIDNEYS: Symmetric and normal size  Right kidney:  10 4 x 4 cm  Left kidney:  10 3 x 4 3 cm  Right kidney Normal echogenicity and contour  No suspicious masses detected  No hydronephrosis  No shadowing calculi  No perinephric fluid collections  Left kidney Normal echogenicity and contour  No suspicious masses detected  1 8 cm cyst with a thin septation in the lower pole  No hydronephrosis  No shadowing calculi  No perinephric fluid collections  URETERS: Nonvisualized  BLADDER: Collapsed around a Acevedo catheter  Impression: No hydronephrosis   Workstation performed: [id number]       Current Facility-Administered Medications   Medication Dose Route Frequency    acetaminophen (TYLENOL) tablet 650 mg  650 mg Oral Q6H PRN    apixaban (ELIQUIS) tablet 5 mg  5 mg Oral BID    budesonide-formoterol (SYMBICORT) 160-4 5 mcg/act inhaler 2 puff  2 puff Inhalation BID    furosemide (LASIX) tablet 20 mg  20 mg Oral Daily PRN    furosemide (LASIX) tablet 40 mg  40 mg Oral Early Morning    ipratropium-albuterol (DUO-NEB) 0 5-2 5 mg/3 mL inhalation solution 3 mL  3 mL Nebulization Q6H PRN    magnesium oxide (MAG-OX) tablet 400 mg  400 mg Oral BID    ondansetron (ZOFRAN) injection 4 mg  4 mg Intravenous Q6H PRN    oxybutynin (DITROPAN-XL) 24 hr tablet 15 mg  15 mg Oral HS    potassium chloride (K-DUR,KLOR-CON) CR tablet 10 mEq  10 mEq Oral Daily    potassium-sodium phosphates (PHOS-NAK) packet 1 packet  1 packet Oral BID With Meals    pravastatin (PRAVACHOL) tablet 80 mg  80 mg Oral Daily With Dinner     Medications Discontinued During This Encounter   Medication Reason    ipratropium-albuterol (DUO-NEB) 0 5-2 5 mg/3 mL inhalation solution 3 mL     sodium chloride 0 9 % infusion        Dave Queen, DO      This progress note was produced in part using a dictation device which may document imprecise wording from author's original intent

## 2020-02-20 ENCOUNTER — TELEPHONE (OUTPATIENT)
Dept: UROLOGY | Facility: MEDICAL CENTER | Age: 85
End: 2020-02-20

## 2020-02-20 NOTE — TELEPHONE ENCOUNTER
Patient of Dr Shea Gutierrez seen in the [address] [facility] office  Patient recently discharged form hospital and now with [facility]  Abdelrahman with [facility] will need catheter care instructions    He can be reached at [phone]  If orders are to be faxed, please send to [phone]  Thank you

## 2020-04-01 ENCOUNTER — TELEPHONE (OUTPATIENT)
Dept: UROLOGY | Facility: MEDICAL CENTER | Age: 85
End: 2020-04-01

## 2020-04-14 ENCOUNTER — TELEPHONE (OUTPATIENT)
Dept: UROLOGY | Facility: MEDICAL CENTER | Age: 85
End: 2020-04-14

## 2020-06-09 ENCOUNTER — TELEPHONE (OUTPATIENT)
Dept: UROLOGY | Facility: MEDICAL CENTER | Age: 85
End: 2020-06-09

## 2020-08-03 ENCOUNTER — TELEPHONE (OUTPATIENT)
Dept: UROLOGY | Facility: MEDICAL CENTER | Age: 85
End: 2020-08-03

## 2020-08-03 NOTE — TELEPHONE ENCOUNTER
Call placed to patients son and spoke to his wife who states patient is in a facility that is currently on lock down  Patients son will call back to reschedule apt

## 2020-08-11 ENCOUNTER — TELEPHONE (OUTPATIENT)
Dept: UROLOGY | Facility: MEDICAL CENTER | Age: 85
End: 2020-08-11

## 2020-08-11 NOTE — TELEPHONE ENCOUNTER
Patient of Dr Taiwo Carter from [facility] home care calling to see if they can get certification orders for patient

## 2020-10-13 NOTE — TELEPHONE ENCOUNTER
Received call from Suhas Chua with [facility] in regard to request for a re-certification for continued catheter care    Please call at [phone]  Thank you

## 2020-10-13 NOTE — TELEPHONE ENCOUNTER
Call placed to Tristan Mathis at [facility] and recertification orders were given for [facility] to continue with catheter care at this time

## 2020-10-30 ENCOUNTER — TELEPHONE (OUTPATIENT)
Dept: UROLOGY | Facility: MEDICAL CENTER | Age: 85
End: 2020-10-30

## 2020-11-26 ENCOUNTER — APPOINTMENT (EMERGENCY)
Dept: RADIOLOGY | Facility: HOSPITAL | Age: 85
DRG: 181 | End: 2020-11-26
Payer: MEDICARE

## 2020-11-26 ENCOUNTER — HOSPITAL ENCOUNTER (INPATIENT)
Facility: HOSPITAL | Age: 85
LOS: 5 days | Discharge: HOME | DRG: 181 | End: 2020-12-01
Attending: EMERGENCY MEDICINE | Admitting: HOSPITALIST
Payer: MEDICARE

## 2020-11-26 DIAGNOSIS — J18.9 PNEUMONIA: Primary | ICD-10-CM

## 2020-11-26 PROBLEM — R93.89 ABNORMAL CHEST X-RAY: Status: ACTIVE | Noted: 2020-11-26

## 2020-11-26 LAB
ALBUMIN SERPL BCP-MCNC: 3.5 G/DL (ref 3.5–5.7)
ALP SERPL-CCNC: 47 U/L (ref 55–165)
ALT SERPL W P-5'-P-CCNC: 11 U/L (ref 7–52)
ANION GAP SERPL CALCULATED.3IONS-SCNC: 6 MMOL/L (ref 4–13)
APTT PPP: 33 SECONDS (ref 23–37)
AST SERPL W P-5'-P-CCNC: 21 U/L (ref 13–39)
BASOPHILS # BLD AUTO: 0.1 THOUSANDS/ΜL (ref 0–0.1)
BASOPHILS NFR BLD AUTO: 1 % (ref 0–2)
BILIRUB SERPL-MCNC: 1.1 MG/DL (ref 0.2–1)
BNP SERPL-MCNC: 225 PG/ML (ref 1–100)
BUN SERPL-MCNC: 18 MG/DL (ref 7–25)
CALCIUM SERPL-MCNC: 8.8 MG/DL (ref 8.6–10.5)
CHLORIDE SERPL-SCNC: 102 MMOL/L (ref 98–107)
CO2 SERPL-SCNC: 31 MMOL/L (ref 21–31)
CREAT SERPL-MCNC: 1.03 MG/DL (ref 0.7–1.3)
EOSINOPHIL # BLD AUTO: 0.1 THOUSAND/ΜL (ref 0–0.61)
EOSINOPHIL NFR BLD AUTO: 2 % (ref 0–5)
ERYTHROCYTE [DISTWIDTH] IN BLOOD BY AUTOMATED COUNT: 15.5 % (ref 11.5–14.5)
FLUAV RNA RESP QL NAA+PROBE: NEGATIVE
FLUBV RNA RESP QL NAA+PROBE: NEGATIVE
GFR SERPL CREATININE-BSD FRML MDRD: 61 ML/MIN/1.73SQ M
GLUCOSE SERPL-MCNC: 99 MG/DL (ref 65–99)
HCT VFR BLD AUTO: 42.7 % (ref 42–47)
HGB BLD-MCNC: 13.7 G/DL (ref 14–18)
INR PPP: 1.47 (ref 0.84–1.19)
LYMPHOCYTES # BLD AUTO: 2.5 THOUSANDS/ΜL (ref 0.6–4.47)
LYMPHOCYTES NFR BLD AUTO: 28 % (ref 21–51)
MCH RBC QN AUTO: 26.4 PG (ref 26–34)
MCHC RBC AUTO-ENTMCNC: 32 G/DL (ref 31–37)
MCV RBC AUTO: 83 FL (ref 81–99)
MONOCYTES # BLD AUTO: 0.6 THOUSAND/ΜL (ref 0.17–1.22)
MONOCYTES NFR BLD AUTO: 7 % (ref 2–12)
NEUTROPHILS # BLD AUTO: 5.6 THOUSANDS/ΜL (ref 1.4–6.5)
NEUTS SEG NFR BLD AUTO: 63 % (ref 42–75)
PLATELET # BLD AUTO: 159 THOUSANDS/UL (ref 149–390)
PMV BLD AUTO: 10.1 FL (ref 8.6–11.7)
POTASSIUM SERPL-SCNC: 4.4 MMOL/L (ref 3.5–5.5)
PROCALCITONIN SERPL-MCNC: <0.05 NG/ML
PROT SERPL-MCNC: 6.4 G/DL (ref 6.4–8.9)
PROTHROMBIN TIME: 17.6 SECONDS (ref 11.6–14.5)
RBC # BLD AUTO: 5.17 MILLION/UL (ref 4.3–5.9)
RSV RNA RESP QL NAA+PROBE: NEGATIVE
SARS-COV-2 RNA RESP QL NAA+PROBE: NEGATIVE
SODIUM SERPL-SCNC: 139 MMOL/L (ref 134–143)
TROPONIN I SERPL-MCNC: <0.03 NG/ML
WBC # BLD AUTO: 8.9 THOUSAND/UL (ref 4.8–10.8)

## 2020-11-26 PROCEDURE — 87040 BLOOD CULTURE FOR BACTERIA: CPT | Performed by: EMERGENCY MEDICINE

## 2020-11-26 PROCEDURE — 84484 ASSAY OF TROPONIN QUANT: CPT

## 2020-11-26 PROCEDURE — 94760 N-INVAS EAR/PLS OXIMETRY 1: CPT

## 2020-11-26 PROCEDURE — 99285 EMERGENCY DEPT VISIT HI MDM: CPT | Performed by: EMERGENCY MEDICINE

## 2020-11-26 PROCEDURE — 83880 ASSAY OF NATRIURETIC PEPTIDE: CPT

## 2020-11-26 PROCEDURE — 36415 COLL VENOUS BLD VENIPUNCTURE: CPT

## 2020-11-26 PROCEDURE — 0241U HB NFCT DS VIR RESP RNA 4 TRGT: CPT | Performed by: EMERGENCY MEDICINE

## 2020-11-26 PROCEDURE — 85610 PROTHROMBIN TIME: CPT

## 2020-11-26 PROCEDURE — 71045 X-RAY EXAM CHEST 1 VIEW: CPT

## 2020-11-26 PROCEDURE — 99223 1ST HOSP IP/OBS HIGH 75: CPT | Performed by: PHYSICIAN ASSISTANT

## 2020-11-26 PROCEDURE — 85025 COMPLETE CBC W/AUTO DIFF WBC: CPT

## 2020-11-26 PROCEDURE — 87449 NOS EACH ORGANISM AG IA: CPT | Performed by: PHYSICIAN ASSISTANT

## 2020-11-26 PROCEDURE — 85730 THROMBOPLASTIN TIME PARTIAL: CPT

## 2020-11-26 PROCEDURE — 94640 AIRWAY INHALATION TREATMENT: CPT

## 2020-11-26 PROCEDURE — 94664 DEMO&/EVAL PT USE INHALER: CPT

## 2020-11-26 PROCEDURE — 80053 COMPREHEN METABOLIC PANEL: CPT

## 2020-11-26 PROCEDURE — 84145 PROCALCITONIN (PCT): CPT | Performed by: PHYSICIAN ASSISTANT

## 2020-11-26 PROCEDURE — 93005 ELECTROCARDIOGRAM TRACING: CPT

## 2020-11-26 PROCEDURE — 99285 EMERGENCY DEPT VISIT HI MDM: CPT

## 2020-11-26 RX ORDER — SODIUM CHLORIDE 9 MG/ML
100 INJECTION, SOLUTION INTRAVENOUS ONCE
Status: COMPLETED | OUTPATIENT
Start: 2020-11-26 | End: 2020-11-27

## 2020-11-26 RX ORDER — LEVALBUTEROL 1.25 MG/.5ML
1.25 SOLUTION, CONCENTRATE RESPIRATORY (INHALATION)
Status: DISCONTINUED | OUTPATIENT
Start: 2020-11-26 | End: 2020-12-01 | Stop reason: HOSPADM

## 2020-11-26 RX ORDER — CEFTRIAXONE 1 G/50ML
1000 INJECTION, SOLUTION INTRAVENOUS EVERY 24 HOURS
Status: DISCONTINUED | OUTPATIENT
Start: 2020-11-27 | End: 2020-11-29

## 2020-11-26 RX ORDER — IPRATROPIUM BROMIDE AND ALBUTEROL SULFATE 2.5; .5 MG/3ML; MG/3ML
3 SOLUTION RESPIRATORY (INHALATION) 3 TIMES DAILY
COMMUNITY

## 2020-11-26 RX ORDER — MONTELUKAST SODIUM 4 MG/1
1 TABLET, CHEWABLE ORAL 2 TIMES DAILY
COMMUNITY

## 2020-11-26 RX ORDER — MELATONIN
5000 DAILY
COMMUNITY

## 2020-11-26 RX ORDER — POLYETHYLENE GLYCOL 3350 17 G/17G
17 POWDER, FOR SOLUTION ORAL DAILY
COMMUNITY

## 2020-11-26 RX ORDER — ACETAMINOPHEN 325 MG/1
650 TABLET ORAL EVERY 4 HOURS PRN
Status: DISCONTINUED | OUTPATIENT
Start: 2020-11-26 | End: 2020-12-01 | Stop reason: HOSPADM

## 2020-11-26 RX ORDER — GUAIFENESIN 600 MG
600 TABLET, EXTENDED RELEASE 12 HR ORAL EVERY 12 HOURS SCHEDULED
COMMUNITY

## 2020-11-26 RX ORDER — ALBUTEROL SULFATE 2.5 MG/3ML
2.5 SOLUTION RESPIRATORY (INHALATION) EVERY 4 HOURS PRN
Status: DISCONTINUED | OUTPATIENT
Start: 2020-11-26 | End: 2020-12-01 | Stop reason: HOSPADM

## 2020-11-26 RX ORDER — ONDANSETRON 2 MG/ML
4 INJECTION INTRAMUSCULAR; INTRAVENOUS EVERY 6 HOURS PRN
Status: DISCONTINUED | OUTPATIENT
Start: 2020-11-26 | End: 2020-12-01 | Stop reason: HOSPADM

## 2020-11-26 RX ORDER — CEFEPIME HYDROCHLORIDE 2 G/50ML
2000 INJECTION, SOLUTION INTRAVENOUS ONCE
Status: COMPLETED | OUTPATIENT
Start: 2020-11-26 | End: 2020-11-26

## 2020-11-26 RX ADMIN — IPRATROPIUM BROMIDE 0.5 MG: 0.5 SOLUTION RESPIRATORY (INHALATION) at 13:47

## 2020-11-26 RX ADMIN — LEVALBUTEROL HYDROCHLORIDE 1.25 MG: 1.25 SOLUTION, CONCENTRATE RESPIRATORY (INHALATION) at 19:17

## 2020-11-26 RX ADMIN — CEFEPIME HYDROCHLORIDE 2000 MG: 2 INJECTION, SOLUTION INTRAVENOUS at 10:34

## 2020-11-26 RX ADMIN — IPRATROPIUM BROMIDE 0.5 MG: 0.5 SOLUTION RESPIRATORY (INHALATION) at 19:17

## 2020-11-26 RX ADMIN — SODIUM CHLORIDE 100 ML/HR: 0.9 INJECTION, SOLUTION INTRAVENOUS at 12:03

## 2020-11-26 RX ADMIN — AZITHROMYCIN MONOHYDRATE 500 MG: 500 INJECTION, POWDER, LYOPHILIZED, FOR SOLUTION INTRAVENOUS at 12:04

## 2020-11-26 RX ADMIN — ACETAMINOPHEN 650 MG: 325 TABLET ORAL at 14:35

## 2020-11-26 RX ADMIN — LEVALBUTEROL HYDROCHLORIDE 1.25 MG: 1.25 SOLUTION, CONCENTRATE RESPIRATORY (INHALATION) at 13:47

## 2020-11-27 ENCOUNTER — APPOINTMENT (INPATIENT)
Dept: CT IMAGING | Facility: HOSPITAL | Age: 85
DRG: 181 | End: 2020-11-27
Payer: MEDICARE

## 2020-11-27 LAB
ATRIAL RATE: 98 BPM
L PNEUMO1 AG UR QL IA.RAPID: NEGATIVE
P AXIS: 73 DEGREES
PR INTERVAL: 136 MS
PROCALCITONIN SERPL-MCNC: <0.05 NG/ML
QRS AXIS: 270 DEGREES
QRSD INTERVAL: 128 MS
QT INTERVAL: 388 MS
QTC INTERVAL: 495 MS
S PNEUM AG UR QL: NEGATIVE
T WAVE AXIS: 52 DEGREES
VENTRICULAR RATE: 98 BPM

## 2020-11-27 PROCEDURE — 97530 THERAPEUTIC ACTIVITIES: CPT

## 2020-11-27 PROCEDURE — 84145 PROCALCITONIN (PCT): CPT | Performed by: PHYSICIAN ASSISTANT

## 2020-11-27 PROCEDURE — 94760 N-INVAS EAR/PLS OXIMETRY 1: CPT

## 2020-11-27 PROCEDURE — 94640 AIRWAY INHALATION TREATMENT: CPT

## 2020-11-27 PROCEDURE — 97163 PT EVAL HIGH COMPLEX 45 MIN: CPT

## 2020-11-27 PROCEDURE — 99232 SBSQ HOSP IP/OBS MODERATE 35: CPT | Performed by: STUDENT IN AN ORGANIZED HEALTH CARE EDUCATION/TRAINING PROGRAM

## 2020-11-27 PROCEDURE — 71250 CT THORAX DX C-: CPT

## 2020-11-27 PROCEDURE — 93010 ELECTROCARDIOGRAM REPORT: CPT | Performed by: INTERNAL MEDICINE

## 2020-11-27 PROCEDURE — 97167 OT EVAL HIGH COMPLEX 60 MIN: CPT

## 2020-11-27 PROCEDURE — G1004 CDSM NDSC: HCPCS

## 2020-11-27 RX ORDER — FUROSEMIDE 40 MG/1
40 TABLET ORAL DAILY
Status: DISCONTINUED | OUTPATIENT
Start: 2020-11-27 | End: 2020-12-01 | Stop reason: HOSPADM

## 2020-11-27 RX ORDER — CEFDINIR 300 MG/1
300 CAPSULE ORAL EVERY 12 HOURS SCHEDULED
Qty: 10 CAPSULE | Refills: 0 | Status: SHIPPED | OUTPATIENT
Start: 2020-11-27 | End: 2020-12-02

## 2020-11-27 RX ADMIN — METOPROLOL TARTRATE 25 MG: 25 TABLET, FILM COATED ORAL at 08:04

## 2020-11-27 RX ADMIN — LEVALBUTEROL HYDROCHLORIDE 1.25 MG: 1.25 SOLUTION, CONCENTRATE RESPIRATORY (INHALATION) at 20:54

## 2020-11-27 RX ADMIN — CEFTRIAXONE 1000 MG: 1 INJECTION, SOLUTION INTRAVENOUS at 12:11

## 2020-11-27 RX ADMIN — LEVALBUTEROL HYDROCHLORIDE 1.25 MG: 1.25 SOLUTION, CONCENTRATE RESPIRATORY (INHALATION) at 07:23

## 2020-11-27 RX ADMIN — IPRATROPIUM BROMIDE 0.5 MG: 0.5 SOLUTION RESPIRATORY (INHALATION) at 07:24

## 2020-11-27 RX ADMIN — IPRATROPIUM BROMIDE 0.5 MG: 0.5 SOLUTION RESPIRATORY (INHALATION) at 14:58

## 2020-11-27 RX ADMIN — ENOXAPARIN SODIUM 30 MG: 30 INJECTION SUBCUTANEOUS at 08:04

## 2020-11-27 RX ADMIN — AZITHROMYCIN MONOHYDRATE 500 MG: 500 INJECTION, POWDER, LYOPHILIZED, FOR SOLUTION INTRAVENOUS at 11:00

## 2020-11-27 RX ADMIN — LEVALBUTEROL HYDROCHLORIDE 1.25 MG: 1.25 SOLUTION, CONCENTRATE RESPIRATORY (INHALATION) at 14:58

## 2020-11-27 RX ADMIN — FUROSEMIDE 40 MG: 40 TABLET ORAL at 08:04

## 2020-11-27 RX ADMIN — ACETAMINOPHEN 650 MG: 325 TABLET ORAL at 22:49

## 2020-11-27 RX ADMIN — IPRATROPIUM BROMIDE 0.5 MG: 0.5 SOLUTION RESPIRATORY (INHALATION) at 20:54

## 2020-11-27 RX ADMIN — METOPROLOL TARTRATE 25 MG: 25 TABLET, FILM COATED ORAL at 17:24

## 2020-11-28 PROBLEM — J96.11 CHRONIC RESPIRATORY FAILURE WITH HYPOXIA (HCC): Chronic | Status: ACTIVE | Noted: 2020-11-28

## 2020-11-28 LAB
ALBUMIN SERPL BCP-MCNC: 2.9 G/DL (ref 3.5–5.7)
ALP SERPL-CCNC: 44 U/L (ref 55–165)
ALT SERPL W P-5'-P-CCNC: 10 U/L (ref 7–52)
ANION GAP SERPL CALCULATED.3IONS-SCNC: 7 MMOL/L (ref 4–13)
AST SERPL W P-5'-P-CCNC: 18 U/L (ref 13–39)
BASOPHILS # BLD AUTO: 0.1 THOUSANDS/ΜL (ref 0–0.1)
BASOPHILS NFR BLD AUTO: 1 % (ref 0–2)
BILIRUB SERPL-MCNC: 0.8 MG/DL (ref 0.2–1)
BUN SERPL-MCNC: 18 MG/DL (ref 7–25)
CALCIUM ALBUM COR SERPL-MCNC: 9.2 MG/DL (ref 8.3–10.1)
CALCIUM SERPL-MCNC: 8.3 MG/DL (ref 8.6–10.5)
CHLORIDE SERPL-SCNC: 102 MMOL/L (ref 98–107)
CO2 SERPL-SCNC: 30 MMOL/L (ref 21–31)
CREAT SERPL-MCNC: 0.97 MG/DL (ref 0.7–1.3)
EOSINOPHIL # BLD AUTO: 0 THOUSAND/ΜL (ref 0–0.61)
EOSINOPHIL NFR BLD AUTO: 0 % (ref 0–5)
ERYTHROCYTE [DISTWIDTH] IN BLOOD BY AUTOMATED COUNT: 15.5 % (ref 11.5–14.5)
GFR SERPL CREATININE-BSD FRML MDRD: 66 ML/MIN/1.73SQ M
GLUCOSE SERPL-MCNC: 100 MG/DL (ref 65–99)
HCT VFR BLD AUTO: 40 % (ref 42–47)
HGB BLD-MCNC: 12.7 G/DL (ref 14–18)
LYMPHOCYTES # BLD AUTO: 2.5 THOUSANDS/ΜL (ref 0.6–4.47)
LYMPHOCYTES NFR BLD AUTO: 22 % (ref 21–51)
MAGNESIUM SERPL-MCNC: 1.8 MG/DL (ref 1.9–2.7)
MCH RBC QN AUTO: 26.3 PG (ref 26–34)
MCHC RBC AUTO-ENTMCNC: 31.9 G/DL (ref 31–37)
MCV RBC AUTO: 83 FL (ref 81–99)
MONOCYTES # BLD AUTO: 0.9 THOUSAND/ΜL (ref 0.17–1.22)
MONOCYTES NFR BLD AUTO: 8 % (ref 2–12)
NEUTROPHILS # BLD AUTO: 7.7 THOUSANDS/ΜL (ref 1.4–6.5)
NEUTS SEG NFR BLD AUTO: 69 % (ref 42–75)
PLATELET # BLD AUTO: 144 THOUSANDS/UL (ref 149–390)
PMV BLD AUTO: 10.5 FL (ref 8.6–11.7)
POTASSIUM SERPL-SCNC: 3.9 MMOL/L (ref 3.5–5.5)
PROT SERPL-MCNC: 5.7 G/DL (ref 6.4–8.9)
RBC # BLD AUTO: 4.84 MILLION/UL (ref 4.3–5.9)
SODIUM SERPL-SCNC: 139 MMOL/L (ref 134–143)
WBC # BLD AUTO: 11.2 THOUSAND/UL (ref 4.8–10.8)

## 2020-11-28 PROCEDURE — 80053 COMPREHEN METABOLIC PANEL: CPT | Performed by: STUDENT IN AN ORGANIZED HEALTH CARE EDUCATION/TRAINING PROGRAM

## 2020-11-28 PROCEDURE — 99232 SBSQ HOSP IP/OBS MODERATE 35: CPT | Performed by: PHYSICIAN ASSISTANT

## 2020-11-28 PROCEDURE — 94640 AIRWAY INHALATION TREATMENT: CPT

## 2020-11-28 PROCEDURE — 94760 N-INVAS EAR/PLS OXIMETRY 1: CPT

## 2020-11-28 PROCEDURE — 83735 ASSAY OF MAGNESIUM: CPT | Performed by: STUDENT IN AN ORGANIZED HEALTH CARE EDUCATION/TRAINING PROGRAM

## 2020-11-28 PROCEDURE — 85025 COMPLETE CBC W/AUTO DIFF WBC: CPT | Performed by: STUDENT IN AN ORGANIZED HEALTH CARE EDUCATION/TRAINING PROGRAM

## 2020-11-28 RX ADMIN — AZITHROMYCIN MONOHYDRATE 500 MG: 500 INJECTION, POWDER, LYOPHILIZED, FOR SOLUTION INTRAVENOUS at 11:37

## 2020-11-28 RX ADMIN — ENOXAPARIN SODIUM 30 MG: 30 INJECTION SUBCUTANEOUS at 10:45

## 2020-11-28 RX ADMIN — IPRATROPIUM BROMIDE 0.5 MG: 0.5 SOLUTION RESPIRATORY (INHALATION) at 08:40

## 2020-11-28 RX ADMIN — LEVALBUTEROL HYDROCHLORIDE 1.25 MG: 1.25 SOLUTION, CONCENTRATE RESPIRATORY (INHALATION) at 20:01

## 2020-11-28 RX ADMIN — FUROSEMIDE 40 MG: 40 TABLET ORAL at 10:46

## 2020-11-28 RX ADMIN — METOPROLOL TARTRATE 25 MG: 25 TABLET, FILM COATED ORAL at 17:09

## 2020-11-28 RX ADMIN — IPRATROPIUM BROMIDE 0.5 MG: 0.5 SOLUTION RESPIRATORY (INHALATION) at 14:13

## 2020-11-28 RX ADMIN — LEVALBUTEROL HYDROCHLORIDE 1.25 MG: 1.25 SOLUTION, CONCENTRATE RESPIRATORY (INHALATION) at 08:40

## 2020-11-28 RX ADMIN — IPRATROPIUM BROMIDE 0.5 MG: 0.5 SOLUTION RESPIRATORY (INHALATION) at 20:01

## 2020-11-28 RX ADMIN — CEFTRIAXONE 1000 MG: 1 INJECTION, SOLUTION INTRAVENOUS at 10:51

## 2020-11-28 RX ADMIN — METOPROLOL TARTRATE 25 MG: 25 TABLET, FILM COATED ORAL at 10:59

## 2020-11-28 RX ADMIN — LEVALBUTEROL HYDROCHLORIDE 1.25 MG: 1.25 SOLUTION, CONCENTRATE RESPIRATORY (INHALATION) at 14:13

## 2020-11-29 PROBLEM — Z71.89 GOALS OF CARE, COUNSELING/DISCUSSION: Status: ACTIVE | Noted: 2020-11-29

## 2020-11-29 LAB
ALBUMIN SERPL BCP-MCNC: 2.7 G/DL (ref 3.5–5.7)
ALP SERPL-CCNC: 44 U/L (ref 55–165)
ALT SERPL W P-5'-P-CCNC: 9 U/L (ref 7–52)
ANION GAP SERPL CALCULATED.3IONS-SCNC: 6 MMOL/L (ref 4–13)
AST SERPL W P-5'-P-CCNC: 16 U/L (ref 13–39)
BASOPHILS # BLD AUTO: 0 THOUSANDS/ΜL (ref 0–0.1)
BASOPHILS NFR BLD AUTO: 0 % (ref 0–2)
BILIRUB SERPL-MCNC: 0.5 MG/DL (ref 0.2–1)
BUN SERPL-MCNC: 21 MG/DL (ref 7–25)
CALCIUM ALBUM COR SERPL-MCNC: 9.1 MG/DL (ref 8.3–10.1)
CALCIUM SERPL-MCNC: 8.1 MG/DL (ref 8.6–10.5)
CHLORIDE SERPL-SCNC: 100 MMOL/L (ref 98–107)
CO2 SERPL-SCNC: 31 MMOL/L (ref 21–31)
CREAT SERPL-MCNC: 0.89 MG/DL (ref 0.7–1.3)
EOSINOPHIL # BLD AUTO: 0.1 THOUSAND/ΜL (ref 0–0.61)
EOSINOPHIL NFR BLD AUTO: 1 % (ref 0–5)
ERYTHROCYTE [DISTWIDTH] IN BLOOD BY AUTOMATED COUNT: 15.5 % (ref 11.5–14.5)
GFR SERPL CREATININE-BSD FRML MDRD: 72 ML/MIN/1.73SQ M
GLUCOSE SERPL-MCNC: 91 MG/DL (ref 65–99)
HCT VFR BLD AUTO: 37.5 % (ref 42–47)
HGB BLD-MCNC: 12.1 G/DL (ref 14–18)
LYMPHOCYTES # BLD AUTO: 2.6 THOUSANDS/ΜL (ref 0.6–4.47)
LYMPHOCYTES NFR BLD AUTO: 29 % (ref 21–51)
MAGNESIUM SERPL-MCNC: 1.7 MG/DL (ref 1.9–2.7)
MCH RBC QN AUTO: 26.7 PG (ref 26–34)
MCHC RBC AUTO-ENTMCNC: 32.4 G/DL (ref 31–37)
MCV RBC AUTO: 83 FL (ref 81–99)
MONOCYTES # BLD AUTO: 0.8 THOUSAND/ΜL (ref 0.17–1.22)
MONOCYTES NFR BLD AUTO: 9 % (ref 2–12)
NEUTROPHILS # BLD AUTO: 5.5 THOUSANDS/ΜL (ref 1.4–6.5)
NEUTS SEG NFR BLD AUTO: 61 % (ref 42–75)
PLATELET # BLD AUTO: 141 THOUSANDS/UL (ref 149–390)
PMV BLD AUTO: 10.6 FL (ref 8.6–11.7)
POTASSIUM SERPL-SCNC: 3.8 MMOL/L (ref 3.5–5.5)
PROT SERPL-MCNC: 5.4 G/DL (ref 6.4–8.9)
RBC # BLD AUTO: 4.54 MILLION/UL (ref 4.3–5.9)
SODIUM SERPL-SCNC: 137 MMOL/L (ref 134–143)
WBC # BLD AUTO: 9.1 THOUSAND/UL (ref 4.8–10.8)

## 2020-11-29 PROCEDURE — 94760 N-INVAS EAR/PLS OXIMETRY 1: CPT

## 2020-11-29 PROCEDURE — 80053 COMPREHEN METABOLIC PANEL: CPT | Performed by: STUDENT IN AN ORGANIZED HEALTH CARE EDUCATION/TRAINING PROGRAM

## 2020-11-29 PROCEDURE — 83735 ASSAY OF MAGNESIUM: CPT | Performed by: STUDENT IN AN ORGANIZED HEALTH CARE EDUCATION/TRAINING PROGRAM

## 2020-11-29 PROCEDURE — 85025 COMPLETE CBC W/AUTO DIFF WBC: CPT | Performed by: STUDENT IN AN ORGANIZED HEALTH CARE EDUCATION/TRAINING PROGRAM

## 2020-11-29 PROCEDURE — 99497 ADVNCD CARE PLAN 30 MIN: CPT | Performed by: HOSPITALIST

## 2020-11-29 PROCEDURE — 99232 SBSQ HOSP IP/OBS MODERATE 35: CPT | Performed by: HOSPITALIST

## 2020-11-29 PROCEDURE — 94640 AIRWAY INHALATION TREATMENT: CPT

## 2020-11-29 RX ORDER — BUDESONIDE AND FORMOTEROL FUMARATE DIHYDRATE 160; 4.5 UG/1; UG/1
2 AEROSOL RESPIRATORY (INHALATION) 2 TIMES DAILY
Status: DISCONTINUED | OUTPATIENT
Start: 2020-11-29 | End: 2020-12-01 | Stop reason: HOSPADM

## 2020-11-29 RX ORDER — PRAVASTATIN SODIUM 40 MG
80 TABLET ORAL
Status: DISCONTINUED | OUTPATIENT
Start: 2020-11-29 | End: 2020-12-01 | Stop reason: HOSPADM

## 2020-11-29 RX ADMIN — LEVALBUTEROL HYDROCHLORIDE 1.25 MG: 1.25 SOLUTION, CONCENTRATE RESPIRATORY (INHALATION) at 19:55

## 2020-11-29 RX ADMIN — METOPROLOL TARTRATE 25 MG: 25 TABLET, FILM COATED ORAL at 17:03

## 2020-11-29 RX ADMIN — PRAVASTATIN SODIUM 80 MG: 40 TABLET ORAL at 16:16

## 2020-11-29 RX ADMIN — METOPROLOL TARTRATE 25 MG: 25 TABLET, FILM COATED ORAL at 08:13

## 2020-11-29 RX ADMIN — LEVALBUTEROL HYDROCHLORIDE 1.25 MG: 1.25 SOLUTION, CONCENTRATE RESPIRATORY (INHALATION) at 07:14

## 2020-11-29 RX ADMIN — BUDESONIDE AND FORMOTEROL FUMARATE DIHYDRATE 2 PUFF: 160; 4.5 AEROSOL RESPIRATORY (INHALATION) at 11:59

## 2020-11-29 RX ADMIN — IPRATROPIUM BROMIDE 0.5 MG: 0.5 SOLUTION RESPIRATORY (INHALATION) at 13:21

## 2020-11-29 RX ADMIN — IPRATROPIUM BROMIDE 0.5 MG: 0.5 SOLUTION RESPIRATORY (INHALATION) at 19:55

## 2020-11-29 RX ADMIN — LEVALBUTEROL HYDROCHLORIDE 1.25 MG: 1.25 SOLUTION, CONCENTRATE RESPIRATORY (INHALATION) at 13:21

## 2020-11-29 RX ADMIN — AZITHROMYCIN MONOHYDRATE 500 MG: 500 INJECTION, POWDER, LYOPHILIZED, FOR SOLUTION INTRAVENOUS at 10:13

## 2020-11-29 RX ADMIN — CEFTRIAXONE 1000 MG: 1 INJECTION, SOLUTION INTRAVENOUS at 11:15

## 2020-11-29 RX ADMIN — IPRATROPIUM BROMIDE 0.5 MG: 0.5 SOLUTION RESPIRATORY (INHALATION) at 07:14

## 2020-11-29 RX ADMIN — ACETAMINOPHEN 650 MG: 325 TABLET ORAL at 03:44

## 2020-11-29 RX ADMIN — BUDESONIDE AND FORMOTEROL FUMARATE DIHYDRATE 2 PUFF: 160; 4.5 AEROSOL RESPIRATORY (INHALATION) at 17:03

## 2020-11-29 RX ADMIN — ENOXAPARIN SODIUM 30 MG: 30 INJECTION SUBCUTANEOUS at 08:13

## 2020-11-29 RX ADMIN — FUROSEMIDE 40 MG: 40 TABLET ORAL at 08:13

## 2020-11-30 LAB
ALBUMIN SERPL BCP-MCNC: 3.1 G/DL (ref 3.5–5.7)
ALP SERPL-CCNC: 58 U/L (ref 55–165)
ALT SERPL W P-5'-P-CCNC: 11 U/L (ref 7–52)
ANION GAP SERPL CALCULATED.3IONS-SCNC: 7 MMOL/L (ref 4–13)
AST SERPL W P-5'-P-CCNC: 20 U/L (ref 13–39)
BASOPHILS # BLD AUTO: 0.1 THOUSANDS/ΜL (ref 0–0.1)
BASOPHILS NFR BLD AUTO: 1 % (ref 0–2)
BILIRUB SERPL-MCNC: 0.5 MG/DL (ref 0.2–1)
BUN SERPL-MCNC: 19 MG/DL (ref 7–25)
CALCIUM ALBUM COR SERPL-MCNC: 9.4 MG/DL (ref 8.3–10.1)
CALCIUM SERPL-MCNC: 8.7 MG/DL (ref 8.6–10.5)
CHLORIDE SERPL-SCNC: 97 MMOL/L (ref 98–107)
CO2 SERPL-SCNC: 32 MMOL/L (ref 21–31)
CREAT SERPL-MCNC: 0.97 MG/DL (ref 0.7–1.3)
EOSINOPHIL # BLD AUTO: 0.2 THOUSAND/ΜL (ref 0–0.61)
EOSINOPHIL NFR BLD AUTO: 3 % (ref 0–5)
ERYTHROCYTE [DISTWIDTH] IN BLOOD BY AUTOMATED COUNT: 15.5 % (ref 11.5–14.5)
FLUAV RNA RESP QL NAA+PROBE: NEGATIVE
FLUBV RNA RESP QL NAA+PROBE: NEGATIVE
GFR SERPL CREATININE-BSD FRML MDRD: 66 ML/MIN/1.73SQ M
GLUCOSE SERPL-MCNC: 100 MG/DL (ref 65–99)
HCT VFR BLD AUTO: 42.7 % (ref 42–47)
HGB BLD-MCNC: 13.5 G/DL (ref 14–18)
LYMPHOCYTES # BLD AUTO: 3.4 THOUSANDS/ΜL (ref 0.6–4.47)
LYMPHOCYTES NFR BLD AUTO: 36 % (ref 21–51)
MAGNESIUM SERPL-MCNC: 1.8 MG/DL (ref 1.9–2.7)
MCH RBC QN AUTO: 26.5 PG (ref 26–34)
MCHC RBC AUTO-ENTMCNC: 31.6 G/DL (ref 31–37)
MCV RBC AUTO: 84 FL (ref 81–99)
MONOCYTES # BLD AUTO: 0.6 THOUSAND/ΜL (ref 0.17–1.22)
MONOCYTES NFR BLD AUTO: 6 % (ref 2–12)
NEUTROPHILS # BLD AUTO: 5.1 THOUSANDS/ΜL (ref 1.4–6.5)
NEUTS SEG NFR BLD AUTO: 55 % (ref 42–75)
PLATELET # BLD AUTO: 179 THOUSANDS/UL (ref 149–390)
PMV BLD AUTO: 10.5 FL (ref 8.6–11.7)
POTASSIUM SERPL-SCNC: 3.9 MMOL/L (ref 3.5–5.5)
PROT SERPL-MCNC: 6.7 G/DL (ref 6.4–8.9)
RBC # BLD AUTO: 5.08 MILLION/UL (ref 4.3–5.9)
RSV RNA RESP QL NAA+PROBE: NEGATIVE
SARS-COV-2 RNA RESP QL NAA+PROBE: NEGATIVE
SODIUM SERPL-SCNC: 136 MMOL/L (ref 134–143)
WBC # BLD AUTO: 9.3 THOUSAND/UL (ref 4.8–10.8)

## 2020-11-30 PROCEDURE — 85025 COMPLETE CBC W/AUTO DIFF WBC: CPT | Performed by: STUDENT IN AN ORGANIZED HEALTH CARE EDUCATION/TRAINING PROGRAM

## 2020-11-30 PROCEDURE — 94760 N-INVAS EAR/PLS OXIMETRY 1: CPT

## 2020-11-30 PROCEDURE — 0241U HB NFCT DS VIR RESP RNA 4 TRGT: CPT | Performed by: INTERNAL MEDICINE

## 2020-11-30 PROCEDURE — 99232 SBSQ HOSP IP/OBS MODERATE 35: CPT | Performed by: INTERNAL MEDICINE

## 2020-11-30 PROCEDURE — 80053 COMPREHEN METABOLIC PANEL: CPT | Performed by: STUDENT IN AN ORGANIZED HEALTH CARE EDUCATION/TRAINING PROGRAM

## 2020-11-30 PROCEDURE — 83735 ASSAY OF MAGNESIUM: CPT | Performed by: STUDENT IN AN ORGANIZED HEALTH CARE EDUCATION/TRAINING PROGRAM

## 2020-11-30 PROCEDURE — 94640 AIRWAY INHALATION TREATMENT: CPT

## 2020-11-30 RX ORDER — OLANZAPINE 10 MG/1
5 INJECTION, POWDER, LYOPHILIZED, FOR SOLUTION INTRAMUSCULAR ONCE
Status: COMPLETED | OUTPATIENT
Start: 2020-11-30 | End: 2020-11-30

## 2020-11-30 RX ORDER — MAGNESIUM SULFATE 1 G/100ML
1 INJECTION INTRAVENOUS ONCE
Status: COMPLETED | OUTPATIENT
Start: 2020-11-30 | End: 2020-11-30

## 2020-11-30 RX ADMIN — MAGNESIUM SULFATE IN DEXTROSE 1 G: 10 INJECTION, SOLUTION INTRAVENOUS at 16:09

## 2020-11-30 RX ADMIN — OLANZAPINE 5 MG: 10 INJECTION, POWDER, LYOPHILIZED, FOR SOLUTION INTRAMUSCULAR at 20:11

## 2020-11-30 RX ADMIN — IPRATROPIUM BROMIDE 0.5 MG: 0.5 SOLUTION RESPIRATORY (INHALATION) at 07:17

## 2020-11-30 RX ADMIN — PRAVASTATIN SODIUM 80 MG: 40 TABLET ORAL at 16:09

## 2020-11-30 RX ADMIN — FUROSEMIDE 40 MG: 40 TABLET ORAL at 08:29

## 2020-11-30 RX ADMIN — LEVALBUTEROL HYDROCHLORIDE 1.25 MG: 1.25 SOLUTION, CONCENTRATE RESPIRATORY (INHALATION) at 14:22

## 2020-11-30 RX ADMIN — WATER 10 ML: 1 INJECTION INTRAMUSCULAR; INTRAVENOUS; SUBCUTANEOUS at 20:16

## 2020-11-30 RX ADMIN — BUDESONIDE AND FORMOTEROL FUMARATE DIHYDRATE 2 PUFF: 160; 4.5 AEROSOL RESPIRATORY (INHALATION) at 17:05

## 2020-11-30 RX ADMIN — BUDESONIDE AND FORMOTEROL FUMARATE DIHYDRATE 2 PUFF: 160; 4.5 AEROSOL RESPIRATORY (INHALATION) at 08:30

## 2020-11-30 RX ADMIN — IPRATROPIUM BROMIDE 0.5 MG: 0.5 SOLUTION RESPIRATORY (INHALATION) at 14:22

## 2020-11-30 RX ADMIN — METOPROLOL TARTRATE 25 MG: 25 TABLET, FILM COATED ORAL at 08:30

## 2020-11-30 RX ADMIN — ENOXAPARIN SODIUM 30 MG: 30 INJECTION SUBCUTANEOUS at 08:29

## 2020-11-30 RX ADMIN — LEVALBUTEROL HYDROCHLORIDE 1.25 MG: 1.25 SOLUTION, CONCENTRATE RESPIRATORY (INHALATION) at 07:17

## 2020-11-30 RX ADMIN — METOPROLOL TARTRATE 25 MG: 25 TABLET, FILM COATED ORAL at 17:02

## 2020-12-01 VITALS
SYSTOLIC BLOOD PRESSURE: 168 MMHG | HEART RATE: 110 BPM | TEMPERATURE: 98.2 F | WEIGHT: 153.66 LBS | RESPIRATION RATE: 20 BRPM | OXYGEN SATURATION: 90 % | DIASTOLIC BLOOD PRESSURE: 74 MMHG | BODY MASS INDEX: 20.84 KG/M2

## 2020-12-01 LAB
BACTERIA BLD CULT: NORMAL
BACTERIA BLD CULT: NORMAL

## 2020-12-01 PROCEDURE — 94760 N-INVAS EAR/PLS OXIMETRY 1: CPT

## 2020-12-01 PROCEDURE — 99239 HOSP IP/OBS DSCHRG MGMT >30: CPT | Performed by: INTERNAL MEDICINE

## 2020-12-01 PROCEDURE — 94640 AIRWAY INHALATION TREATMENT: CPT

## 2020-12-01 RX ADMIN — GUAIFENESIN 200 MG: 100 SOLUTION ORAL at 09:25

## 2020-12-01 RX ADMIN — LEVALBUTEROL HYDROCHLORIDE 1.25 MG: 1.25 SOLUTION, CONCENTRATE RESPIRATORY (INHALATION) at 07:35

## 2020-12-01 RX ADMIN — ACETAMINOPHEN 650 MG: 325 TABLET ORAL at 10:48

## 2020-12-01 RX ADMIN — FUROSEMIDE 40 MG: 40 TABLET ORAL at 09:25

## 2020-12-01 RX ADMIN — BUDESONIDE AND FORMOTEROL FUMARATE DIHYDRATE 2 PUFF: 160; 4.5 AEROSOL RESPIRATORY (INHALATION) at 09:28

## 2020-12-01 RX ADMIN — METOPROLOL TARTRATE 25 MG: 25 TABLET, FILM COATED ORAL at 09:25

## 2020-12-01 RX ADMIN — IPRATROPIUM BROMIDE 0.5 MG: 0.5 SOLUTION RESPIRATORY (INHALATION) at 07:35

## 2020-12-01 RX ADMIN — ENOXAPARIN SODIUM 30 MG: 30 INJECTION SUBCUTANEOUS at 09:25

## 2020-12-03 DIAGNOSIS — Z51.5 HOSPICE CARE: Primary | ICD-10-CM

## 2020-12-03 RX ORDER — MORPHINE SULFATE 100 MG/5ML
5 SOLUTION ORAL EVERY 2 HOUR PRN
Qty: 30 ML | Refills: 0 | Status: SHIPPED | OUTPATIENT
Start: 2020-12-03

## 2024-05-25 NOTE — PLAN OF CARE
Problem: PHYSICAL THERAPY ADULT  Goal: Performs mobility at highest level of function for planned discharge setting  See evaluation for individualized goals  Treatment/Interventions: Functional transfer training, LE strengthening/ROM, Therapeutic exercise, Endurance training, Patient/family training, Equipment eval/education, Bed mobility, Gait training, Spoke to advanced practitioner, OT  Equipment Recommended: Brenda Oconnell, PT    See flowsheet documentation for full assessment, interventions and recommendations  Outcome: Progressing  Prognosis: Good  Problem List: Decreased strength, Decreased endurance, Impaired balance, Decreased mobility, Decreased coordination, Impaired judgement, Decreased safety awareness, Impaired hearing  Assessment: Pt seen for PT treatment session this date with interventions consisting of gait training w/ emphasis on improving pt's ability to ambulate level surfaces x 45 with min A provided by therapist with RW  Pt agreeable to PT treatment session upon arrival, pt found supine in bed, in no apparent distress, A&O x 4 and responsive  In comparison to previous session, pt with improvements in distance of AMB, safety awareness  Post session: chair alarm engaged and all needs in reach Continue to recommend return to [facility] at time of d/c in order to maximize pt's functional independence and safety w/ mobility  Pt continues to be functioning below baseline level, and remains limited 2* factors listed above and including decreased endurance and activity intolerance  PT will continue to see pt while here in order to address the deficits listed above and provide interventions consistent w/ POC in effort to achieve STGs  Recommendation: Home PT, Other (Comment) (return to [facility])     PT - OK to Discharge: Yes (when medically stable)    See flowsheet documentation for full assessment     Joe Weems, PT no
